# Patient Record
Sex: FEMALE | Race: OTHER | HISPANIC OR LATINO
[De-identification: names, ages, dates, MRNs, and addresses within clinical notes are randomized per-mention and may not be internally consistent; named-entity substitution may affect disease eponyms.]

---

## 2018-01-23 PROBLEM — Z00.00 ENCOUNTER FOR PREVENTIVE HEALTH EXAMINATION: Status: ACTIVE | Noted: 2018-01-23

## 2018-01-30 ENCOUNTER — LABORATORY RESULT (OUTPATIENT)
Age: 68
End: 2018-01-30

## 2018-01-31 ENCOUNTER — APPOINTMENT (OUTPATIENT)
Dept: OBGYN | Facility: CLINIC | Age: 68
End: 2018-01-31
Payer: MEDICARE

## 2018-01-31 DIAGNOSIS — Z80.9 FAMILY HISTORY OF MALIGNANT NEOPLASM, UNSPECIFIED: ICD-10-CM

## 2018-01-31 DIAGNOSIS — Z87.09 PERSONAL HISTORY OF OTHER DISEASES OF THE RESPIRATORY SYSTEM: ICD-10-CM

## 2018-01-31 DIAGNOSIS — I10 ESSENTIAL (PRIMARY) HYPERTENSION: ICD-10-CM

## 2018-01-31 PROCEDURE — 99387 INIT PM E/M NEW PAT 65+ YRS: CPT

## 2018-01-31 RX ORDER — VALSARTAN 320 MG/1
320 TABLET, COATED ORAL
Qty: 30 | Refills: 0 | Status: ACTIVE | COMMUNITY
Start: 2017-11-18

## 2018-01-31 RX ORDER — GABAPENTIN 100 MG/1
100 CAPSULE ORAL
Qty: 30 | Refills: 0 | Status: ACTIVE | COMMUNITY
Start: 2017-08-14

## 2018-01-31 RX ORDER — MELOXICAM 15 MG/1
15 TABLET ORAL
Qty: 30 | Refills: 0 | Status: ACTIVE | COMMUNITY
Start: 2017-08-14

## 2018-01-31 RX ORDER — LATANOPROST/PF 0.005 %
0.01 DROPS OPHTHALMIC (EYE)
Qty: 8 | Refills: 0 | Status: ACTIVE | COMMUNITY
Start: 2017-11-22

## 2018-01-31 RX ORDER — ATORVASTATIN CALCIUM 20 MG/1
20 TABLET, FILM COATED ORAL
Qty: 30 | Refills: 0 | Status: ACTIVE | COMMUNITY
Start: 2017-12-16

## 2018-01-31 RX ORDER — AMLODIPINE BESYLATE 2.5 MG/1
2.5 TABLET ORAL
Qty: 30 | Refills: 0 | Status: ACTIVE | COMMUNITY
Start: 2017-12-16

## 2018-01-31 RX ORDER — ASPIRIN 325 MG/1
TABLET, FILM COATED ORAL
Refills: 0 | Status: ACTIVE | COMMUNITY

## 2018-01-31 RX ORDER — CHLORTHALIDONE 25 MG/1
25 TABLET ORAL
Qty: 30 | Refills: 0 | Status: ACTIVE | COMMUNITY
Start: 2017-12-16

## 2018-01-31 RX ORDER — AMLODIPINE BESYLATE 5 MG/1
TABLET ORAL
Refills: 0 | Status: ACTIVE | COMMUNITY

## 2018-08-07 ENCOUNTER — INPATIENT (INPATIENT)
Facility: HOSPITAL | Age: 68
LOS: 1 days | Discharge: HOME | DRG: 313 | End: 2018-08-09
Attending: INTERNAL MEDICINE | Admitting: INTERNAL MEDICINE
Payer: COMMERCIAL

## 2018-08-07 VITALS
SYSTOLIC BLOOD PRESSURE: 184 MMHG | DIASTOLIC BLOOD PRESSURE: 71 MMHG | WEIGHT: 158.07 LBS | TEMPERATURE: 98 F | OXYGEN SATURATION: 100 % | HEIGHT: 65 IN | HEART RATE: 68 BPM | RESPIRATION RATE: 18 BRPM

## 2018-08-07 DIAGNOSIS — E78.5 HYPERLIPIDEMIA, UNSPECIFIED: ICD-10-CM

## 2018-08-07 DIAGNOSIS — Z29.9 ENCOUNTER FOR PROPHYLACTIC MEASURES, UNSPECIFIED: ICD-10-CM

## 2018-08-07 DIAGNOSIS — J45.909 UNSPECIFIED ASTHMA, UNCOMPLICATED: ICD-10-CM

## 2018-08-07 DIAGNOSIS — I10 ESSENTIAL (PRIMARY) HYPERTENSION: ICD-10-CM

## 2018-08-07 DIAGNOSIS — R07.9 CHEST PAIN, UNSPECIFIED: ICD-10-CM

## 2018-08-07 LAB
ALBUMIN SERPL ELPH-MCNC: 3.8 G/DL — SIGNIFICANT CHANGE UP (ref 3.5–5)
ALP SERPL-CCNC: 95 U/L — SIGNIFICANT CHANGE UP (ref 40–120)
ALT FLD-CCNC: 34 U/L DA — SIGNIFICANT CHANGE UP (ref 10–60)
ANION GAP SERPL CALC-SCNC: 6 MMOL/L — SIGNIFICANT CHANGE UP (ref 5–17)
AST SERPL-CCNC: 27 U/L — SIGNIFICANT CHANGE UP (ref 10–40)
BILIRUB SERPL-MCNC: 0.5 MG/DL — SIGNIFICANT CHANGE UP (ref 0.2–1.2)
BUN SERPL-MCNC: 8 MG/DL — SIGNIFICANT CHANGE UP (ref 7–18)
CALCIUM SERPL-MCNC: 9.1 MG/DL — SIGNIFICANT CHANGE UP (ref 8.4–10.5)
CHLORIDE SERPL-SCNC: 105 MMOL/L — SIGNIFICANT CHANGE UP (ref 96–108)
CK MB BLD-MCNC: 0.8 % — SIGNIFICANT CHANGE UP (ref 0–3.5)
CK MB CFR SERPL CALC: 1.4 NG/ML — SIGNIFICANT CHANGE UP (ref 0–3.6)
CK SERPL-CCNC: 173 U/L — SIGNIFICANT CHANGE UP (ref 21–215)
CO2 SERPL-SCNC: 28 MMOL/L — SIGNIFICANT CHANGE UP (ref 22–31)
CREAT SERPL-MCNC: 0.89 MG/DL — SIGNIFICANT CHANGE UP (ref 0.5–1.3)
GLUCOSE SERPL-MCNC: 112 MG/DL — HIGH (ref 70–99)
HCT VFR BLD CALC: 42.8 % — SIGNIFICANT CHANGE UP (ref 34.5–45)
HGB BLD-MCNC: 15.2 G/DL — SIGNIFICANT CHANGE UP (ref 11.5–15.5)
MCHC RBC-ENTMCNC: 33 PG — SIGNIFICANT CHANGE UP (ref 27–34)
MCHC RBC-ENTMCNC: 35.5 GM/DL — SIGNIFICANT CHANGE UP (ref 32–36)
MCV RBC AUTO: 93 FL — SIGNIFICANT CHANGE UP (ref 80–100)
PLATELET # BLD AUTO: 219 K/UL — SIGNIFICANT CHANGE UP (ref 150–400)
POTASSIUM SERPL-MCNC: 3.9 MMOL/L — SIGNIFICANT CHANGE UP (ref 3.5–5.3)
POTASSIUM SERPL-SCNC: 3.9 MMOL/L — SIGNIFICANT CHANGE UP (ref 3.5–5.3)
PROT SERPL-MCNC: 7.5 G/DL — SIGNIFICANT CHANGE UP (ref 6–8.3)
RBC # BLD: 4.6 M/UL — SIGNIFICANT CHANGE UP (ref 3.8–5.2)
RBC # FLD: 11.4 % — SIGNIFICANT CHANGE UP (ref 10.3–14.5)
SODIUM SERPL-SCNC: 139 MMOL/L — SIGNIFICANT CHANGE UP (ref 135–145)
TROPONIN I SERPL-MCNC: <0.015 NG/ML — SIGNIFICANT CHANGE UP (ref 0–0.04)
TROPONIN I SERPL-MCNC: <0.015 NG/ML — SIGNIFICANT CHANGE UP (ref 0–0.04)
WBC # BLD: 6.1 K/UL — SIGNIFICANT CHANGE UP (ref 3.8–10.5)
WBC # FLD AUTO: 6.1 K/UL — SIGNIFICANT CHANGE UP (ref 3.8–10.5)

## 2018-08-07 PROCEDURE — 71046 X-RAY EXAM CHEST 2 VIEWS: CPT | Mod: 26

## 2018-08-07 PROCEDURE — 99285 EMERGENCY DEPT VISIT HI MDM: CPT

## 2018-08-07 RX ORDER — ASPIRIN/CALCIUM CARB/MAGNESIUM 324 MG
81 TABLET ORAL DAILY
Qty: 0 | Refills: 0 | Status: DISCONTINUED | OUTPATIENT
Start: 2018-08-08 | End: 2018-08-09

## 2018-08-07 RX ORDER — ATORVASTATIN CALCIUM 80 MG/1
40 TABLET, FILM COATED ORAL AT BEDTIME
Qty: 0 | Refills: 0 | Status: DISCONTINUED | OUTPATIENT
Start: 2018-08-07 | End: 2018-08-09

## 2018-08-07 RX ORDER — VALSARTAN 80 MG/1
320 TABLET ORAL
Qty: 0 | Refills: 0 | Status: DISCONTINUED | OUTPATIENT
Start: 2018-08-08 | End: 2018-08-09

## 2018-08-07 RX ORDER — ENOXAPARIN SODIUM 100 MG/ML
40 INJECTION SUBCUTANEOUS DAILY
Qty: 0 | Refills: 0 | Status: DISCONTINUED | OUTPATIENT
Start: 2018-08-07 | End: 2018-08-09

## 2018-08-07 RX ORDER — DIAZEPAM 5 MG
2 TABLET ORAL ONCE
Qty: 0 | Refills: 0 | Status: DISCONTINUED | OUTPATIENT
Start: 2018-08-07 | End: 2018-08-07

## 2018-08-07 RX ORDER — ASPIRIN/CALCIUM CARB/MAGNESIUM 324 MG
325 TABLET ORAL ONCE
Qty: 0 | Refills: 0 | Status: COMPLETED | OUTPATIENT
Start: 2018-08-07 | End: 2018-08-07

## 2018-08-07 RX ORDER — VALSARTAN 80 MG/1
320 TABLET ORAL ONCE
Qty: 0 | Refills: 0 | Status: COMPLETED | OUTPATIENT
Start: 2018-08-07 | End: 2018-08-07

## 2018-08-07 RX ORDER — METOPROLOL TARTRATE 50 MG
25 TABLET ORAL DAILY
Qty: 0 | Refills: 0 | Status: DISCONTINUED | OUTPATIENT
Start: 2018-08-07 | End: 2018-08-09

## 2018-08-07 RX ADMIN — Medication 25 MILLIGRAM(S): at 17:10

## 2018-08-07 RX ADMIN — Medication 325 MILLIGRAM(S): at 14:29

## 2018-08-07 RX ADMIN — Medication 2 MILLIGRAM(S): at 15:16

## 2018-08-07 RX ADMIN — ENOXAPARIN SODIUM 40 MILLIGRAM(S): 100 INJECTION SUBCUTANEOUS at 17:10

## 2018-08-07 NOTE — ED ADULT NURSE REASSESSMENT NOTE - NS ED NURSE REASSESS COMMENT FT1
received pt A&Ox3 absent any distress or C/O pain. able to make needs know with care provided PRN. will continue with current plan of care

## 2018-08-07 NOTE — ED ADULT TRIAGE NOTE - CHIEF COMPLAINT QUOTE
biba c/o got dizzy at work , clammy , hands tingling . ems reports pt hypertensive at the scene . pt states chest pressure w/ high blood pressure x 2 days

## 2018-08-07 NOTE — H&P ADULT - HISTORY OF PRESENT ILLNESS
68 yo Yi speaking female with PMH of HTN, HLD, Asthma presented with chest tightness. Patient was shopping at 9:30 AM when suddenly she felt tightness in her chest associated with numbness in left arm and left hand. She felt nauseated and dizzy but did not pass out. She also felt that she could not breath and her body felt cold. She immediately called her daughter, who observed that there was purplish discolouration around her lips. There was no exacerbating or relieving factor. EMS arrived and started the patient on O2 and eventually she felt better. She denies any complain at present.   She is on Valsartan 320 mg for her BP. She informed me that her BP is not well controlled. She was taking amlodipine in the past but it was discontinued because she felt dizzy with it.   Her son also informed me that since last couple of days she has been very anxious and goes to her daughter house or son house because of anxiety. He did not mention any stress factor.   Patient had a stress test 3 years back, which was negative. On asking for the indication of stress test she said that she use to feel very anxious at that time so PCP had a stress test on her.     PMH: HTN, HLD, Asthma. She is on aspirin, atorvastatin 20mg, Valsartan 320mg OD. She has not take any medication for asthma in last 1 year   PSH: right knee surgery and right shoulder surgery for some ligament injury. Hysterectomy in 1982. Recent cosmetic surgery.

## 2018-08-07 NOTE — ED ADULT NURSE NOTE - OBJECTIVE STATEMENT
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 for HTN, taken dose today.  Denies any dizziness, blurry vision. As per daughter stated she felt nauseous and felt near syncopal. off note: Pt has received Botox fillers on Saturday ,daughter concerned if that was related to it. Had filler Botox in past, no reaction. Labs obtained, and sent as ordered

## 2018-08-07 NOTE — ED PROVIDER NOTE - MEDICAL DECISION MAKING DETAILS
66 y/o F pt symptoms highly suspicious of ACS. T-wave inversion on EKG without prior available. Will give Troponin, give Aspirin and admit to r/o ACS.

## 2018-08-07 NOTE — ED PROVIDER NOTE - OBJECTIVE STATEMENT
68 y/o F pt with a significant PMHx of HTN, HLD, intermittent asthma and no significant PSHx presents to the ED c/o chest pain PTA to the ED. Pt states she was walking a few blocks, when she felt a sudden onset of substernal chest pressure radiating to L arm with diaphoresis and nausea. Pt called EMS, but now reports symptoms alleviated completely without intervention; no current chest pain. Pt notes negative stress test 2 years ago. Pt denies lower extremity edema, diarrhea, melena, bloody stools, PE risk factors, previous occurrence or any other complaints. NKDA.

## 2018-08-07 NOTE — H&P ADULT - PROBLEM SELECTOR PLAN 5
IMPROVE VTE Individual Risk Assessment    RISK                                                                Points  [  ] Previous VTE                                                  3  [  ] Thrombophilia                                               2  [  ] Lower limb paralysis                                      2        (unable to hold up >15 seconds)    [  ] Current Cancer                                              2         (within 6 months)  [  ] Immobilization > 24 hrs                                1  [  ] ICU/CCU stay > 24 hours                              1  [x  ] Age > 60                                                      1    IMPROVE VTE Score _____1____  Lovenox 40 mg subcut IMPROVE VTE Individual Risk Assessment    RISK                                                                Points  [  ] Previous VTE                                                  3  [  ] Thrombophilia                                               2  [  ] Lower limb paralysis                                      2        (unable to hold up >15 seconds)    [  ] Current Cancer                                              2         (within 6 months)  [ x ] Immobilization > 24 hrs                                1  [  ] ICU/CCU stay > 24 hours                              1  [x  ] Age > 60                                                      1    IMPROVE VTE Score _____2____  Lovenox 40 mg subcut

## 2018-08-07 NOTE — ED ADULT NURSE NOTE - NSIMPLEMENTINTERV_GEN_ALL_ED
Implemented All Universal Safety Interventions:  [place] to call system. Call bell, personal items and telephone within reach. Instruct patient to call for assistance. Room bathroom lighting operational. Non-slip footwear when patient is off stretcher. Physically safe environment: no spills, clutter or unnecessary equipment. Stretcher in lowest position, wheels locked, appropriate side rails in place.

## 2018-08-07 NOTE — ED PROVIDER NOTE - CARE PLAN
Principal Discharge DX:	Chest pain, unspecified type  Secondary Diagnosis:	Hyperlipidemia, unspecified hyperlipidemia type  Secondary Diagnosis:	Hypertension, unspecified type

## 2018-08-07 NOTE — H&P ADULT - PROBLEM SELECTOR PLAN 1
- p/w chest tightness.   - EKG: TWI in v4,5,6. Not sure if it is new. No old EKG available to confirm  - T1 negative, Follow up with T2, T3.   - Aspirin, metoprolol, statin.  - Tele monitoring   - Dr Mcneil f/u  -d/d: ACS vs panic attack

## 2018-08-07 NOTE — H&P ADULT - ASSESSMENT
66 yo Arabic speaking female with PMH of HTN, HLD, Asthma presented with chest tightness. Admitted to rule out ACS

## 2018-08-08 LAB
24R-OH-CALCIDIOL SERPL-MCNC: 23.8 NG/ML — LOW (ref 30–80)
ANION GAP SERPL CALC-SCNC: 6 MMOL/L — SIGNIFICANT CHANGE UP (ref 5–17)
BASOPHILS # BLD AUTO: 0.1 K/UL — SIGNIFICANT CHANGE UP (ref 0–0.2)
BASOPHILS NFR BLD AUTO: 1 % — SIGNIFICANT CHANGE UP (ref 0–2)
BUN SERPL-MCNC: 10 MG/DL — SIGNIFICANT CHANGE UP (ref 7–18)
CALCIUM SERPL-MCNC: 9.4 MG/DL — SIGNIFICANT CHANGE UP (ref 8.4–10.5)
CHLORIDE SERPL-SCNC: 103 MMOL/L — SIGNIFICANT CHANGE UP (ref 96–108)
CHOLEST SERPL-MCNC: 173 MG/DL — SIGNIFICANT CHANGE UP (ref 10–199)
CK MB BLD-MCNC: 0.7 % — SIGNIFICANT CHANGE UP (ref 0–3.5)
CK MB CFR SERPL CALC: 1.1 NG/ML — SIGNIFICANT CHANGE UP (ref 0–3.6)
CK SERPL-CCNC: 153 U/L — SIGNIFICANT CHANGE UP (ref 21–215)
CO2 SERPL-SCNC: 32 MMOL/L — HIGH (ref 22–31)
CREAT SERPL-MCNC: 0.93 MG/DL — SIGNIFICANT CHANGE UP (ref 0.5–1.3)
EOSINOPHIL # BLD AUTO: 0.2 K/UL — SIGNIFICANT CHANGE UP (ref 0–0.5)
EOSINOPHIL NFR BLD AUTO: 3.2 % — SIGNIFICANT CHANGE UP (ref 0–6)
GLUCOSE SERPL-MCNC: 91 MG/DL — SIGNIFICANT CHANGE UP (ref 70–99)
HBA1C BLD-MCNC: 5.8 % — HIGH (ref 4–5.6)
HCT VFR BLD CALC: 45 % — SIGNIFICANT CHANGE UP (ref 34.5–45)
HDLC SERPL-MCNC: 60 MG/DL — SIGNIFICANT CHANGE UP (ref 40–125)
HGB BLD-MCNC: 14.7 G/DL — SIGNIFICANT CHANGE UP (ref 11.5–15.5)
LIPID PNL WITH DIRECT LDL SERPL: 89 MG/DL — SIGNIFICANT CHANGE UP
LYMPHOCYTES # BLD AUTO: 2.4 K/UL — SIGNIFICANT CHANGE UP (ref 1–3.3)
LYMPHOCYTES # BLD AUTO: 37.2 % — SIGNIFICANT CHANGE UP (ref 13–44)
MAGNESIUM SERPL-MCNC: 2.2 MG/DL — SIGNIFICANT CHANGE UP (ref 1.6–2.6)
MCHC RBC-ENTMCNC: 30.6 PG — SIGNIFICANT CHANGE UP (ref 27–34)
MCHC RBC-ENTMCNC: 32.7 GM/DL — SIGNIFICANT CHANGE UP (ref 32–36)
MCV RBC AUTO: 93.6 FL — SIGNIFICANT CHANGE UP (ref 80–100)
MONOCYTES # BLD AUTO: 0.6 K/UL — SIGNIFICANT CHANGE UP (ref 0–0.9)
MONOCYTES NFR BLD AUTO: 8.6 % — SIGNIFICANT CHANGE UP (ref 2–14)
NEUTROPHILS # BLD AUTO: 3.2 K/UL — SIGNIFICANT CHANGE UP (ref 1.8–7.4)
NEUTROPHILS NFR BLD AUTO: 50 % — SIGNIFICANT CHANGE UP (ref 43–77)
PHOSPHATE SERPL-MCNC: 4.1 MG/DL — SIGNIFICANT CHANGE UP (ref 2.5–4.5)
PLATELET # BLD AUTO: 244 K/UL — SIGNIFICANT CHANGE UP (ref 150–400)
POTASSIUM SERPL-MCNC: 4.2 MMOL/L — SIGNIFICANT CHANGE UP (ref 3.5–5.3)
POTASSIUM SERPL-SCNC: 4.2 MMOL/L — SIGNIFICANT CHANGE UP (ref 3.5–5.3)
RBC # BLD: 4.8 M/UL — SIGNIFICANT CHANGE UP (ref 3.8–5.2)
RBC # FLD: 11.5 % — SIGNIFICANT CHANGE UP (ref 10.3–14.5)
SODIUM SERPL-SCNC: 141 MMOL/L — SIGNIFICANT CHANGE UP (ref 135–145)
TOTAL CHOLESTEROL/HDL RATIO MEASUREMENT: 2.9 RATIO — LOW (ref 3.3–7.1)
TRIGL SERPL-MCNC: 121 MG/DL — SIGNIFICANT CHANGE UP (ref 10–149)
TROPONIN I SERPL-MCNC: <0.015 NG/ML — SIGNIFICANT CHANGE UP (ref 0–0.04)
TSH SERPL-MCNC: 3.22 UU/ML — SIGNIFICANT CHANGE UP (ref 0.34–4.82)
VIT B12 SERPL-MCNC: 464 PG/ML — SIGNIFICANT CHANGE UP (ref 232–1245)
WBC # BLD: 6.5 K/UL — SIGNIFICANT CHANGE UP (ref 3.8–10.5)
WBC # FLD AUTO: 6.5 K/UL — SIGNIFICANT CHANGE UP (ref 3.8–10.5)

## 2018-08-08 RX ORDER — HYDROCHLOROTHIAZIDE 25 MG
12.5 TABLET ORAL DAILY
Qty: 0 | Refills: 0 | Status: DISCONTINUED | OUTPATIENT
Start: 2018-08-08 | End: 2018-08-09

## 2018-08-08 RX ADMIN — ATORVASTATIN CALCIUM 40 MILLIGRAM(S): 80 TABLET, FILM COATED ORAL at 02:15

## 2018-08-08 RX ADMIN — Medication 81 MILLIGRAM(S): at 11:12

## 2018-08-08 RX ADMIN — ATORVASTATIN CALCIUM 40 MILLIGRAM(S): 80 TABLET, FILM COATED ORAL at 22:55

## 2018-08-08 RX ADMIN — ENOXAPARIN SODIUM 40 MILLIGRAM(S): 100 INJECTION SUBCUTANEOUS at 11:12

## 2018-08-08 RX ADMIN — Medication 12.5 MILLIGRAM(S): at 13:09

## 2018-08-08 RX ADMIN — VALSARTAN 320 MILLIGRAM(S): 80 TABLET ORAL at 02:15

## 2018-08-08 RX ADMIN — VALSARTAN 320 MILLIGRAM(S): 80 TABLET ORAL at 08:16

## 2018-08-08 NOTE — CONSULT NOTE ADULT - PROBLEM SELECTOR RECOMMENDATION 9
- p/w chest tightness.   - EKG: TWI in v4,5,6. Not sure if it is new. No old EKG available to confirm  - T1 negative, Follow up with T2, T3.   - Aspirin, metoprolol, statin.  - Tele monitoring   - Dr Mcneil f/u  -d/d: ACS vs panic attack. R/o ACS protocol  ECHO  Cardio eval  Tele monitoring  ASA, BB, Statin

## 2018-08-08 NOTE — CONSULT NOTE ADULT - PROBLEM SELECTOR RECOMMENDATION 4
Pt is not on any medication from last 1 year.   On exam : no wheeze  Monitor pt for any symptoms. Bronchodilators prn  inhaled steroids  pfts as OP

## 2018-08-08 NOTE — PROGRESS NOTE ADULT - SUBJECTIVE AND OBJECTIVE BOX
68 yo [place] speaking female with PMH of HTN, HLD, Asthma presented with chest tightness. Patient was shopping at 9:30 AM when suddenly she felt tightness in her chest associated with numbness in left arm and left hand. She felt nauseated and dizzy but did not pass out. She also felt that she could not breath and her body felt cold. She immediately called her daughter, who observed that there was purplish discolouration around her lips. There was no exacerbating or relieving factor. EMS arrived and started the patient on O2 and eventually she felt better. She denies any complain at present.   She is on Valsartan 320 mg for her BP. She informed me that her BP is not well controlled. She was taking amlodipine in the past but it was discontinued because she felt dizzy with it.   Her son also informed me that since last couple of days she has been very anxious and goes to her daughter house or son house because of anxiety. He did not mention any stress factor.   Patient had a stress test 3 years back, which was negative. On asking for the indication of stress test she said that she use to feel very anxious at that time so PCP had a stress test on her.     PMH: HTN, HLD, Asthma. She is on aspirin, atorvastatin 20mg, Valsartan 320mg OD. She has not take any medication for asthma in last 1 year   PSH: right knee surgery and right shoulder surgery for some ligament injury. Hysterectomy in 1982. Recent cosmetic surgery.          Review of Systems:  Other Review of Systems: All other review of systems negative, except as noted in HPI	      pt seen in ed hold [ x ], on tele [ x  ], bed [ x ], chair at bedside [   ], a+o x3 [ x ], lethargic [  ],  nad [x  ]            Allergies    No Known Allergies        Vitals    T(F): 97.4 (08-08-18 @ 07:40), Max: 98.3 (08-07-18 @ 17:13)  HR: 68 (08-08-18 @ 07:40) (58 - 76)  BP: 177/81 (08-08-18 @ 07:40) (168/85 - 181/89)  RR: 16 (08-08-18 @ 07:40) (16 - 18)  SpO2: 97% (08-08-18 @ 07:40) (95% - 98%)  Wt(kg): --  CAPILLARY BLOOD GLUCOSE      POCT Blood Glucose.: 107 mg/dL (07 Aug 2018 12:49)      Labs                          14.7   6.5   )-----------( 244      ( 08 Aug 2018 07:29 )             45.0       08-08    141  |  103  |  10  ----------------------------<  91  4.2   |  32<H>  |  0.93    Ca    9.4      08 Aug 2018 07:29  Phos  4.1     08-08  Mg     2.2     08-08    TPro  7.5  /  Alb  3.8  /  TBili  0.5  /  DBili  x   /  AST  27  /  ALT  34  /  AlkPhos  95  08-07      CARDIAC MARKERS ( 08 Aug 2018 07:29 )  <0.015 ng/mL / x     / 153 U/L / x     / 1.1 ng/mL  CARDIAC MARKERS ( 07 Aug 2018 19:42 )  <0.015 ng/mL / x     / 173 U/L / x     / 1.4 ng/mL  CARDIAC MARKERS ( 07 Aug 2018 13:37 )  <0.015 ng/mL / x     / x     / x     / x                Radiology Results      Meds    MEDICATIONS  (STANDING):  aspirin enteric coated 81 milliGRAM(s) Oral daily  atorvastatin 40 milliGRAM(s) Oral at bedtime  enoxaparin Injectable 40 milliGRAM(s) SubCutaneous daily  metoprolol succinate ER 25 milliGRAM(s) Oral daily  valsartan 320 milliGRAM(s) Oral <User Schedule>      MEDICATIONS  (PRN):      Physical Exam    Neuro :  no focal deficits  Respiratory: CTA B/L  CV: RRR, S1S2, no murmurs,   Abdominal: Soft, NT, ND +BS,  Extremities: No edema, + peripheral pulses    ASSESSMENT    Chest pain  Asthma  HLD (hyperlipidemia)  HTN (hypertension)  No significant past surgical history      PLAN 68 yo Ghanaian speaking female with PMH of HTN, HLD, Asthma presented with chest tightness. Patient was shopping at 9:30 AM when suddenly she felt tightness in her chest associated with numbness in left arm and left hand. She felt nauseated and dizzy but did not pass out. She also felt that she could not breath and her body felt cold. She immediately called her daughter, who observed that there was purplish discolouration around her lips. There was no exacerbating or relieving factor. EMS arrived and started the patient on O2 and eventually she felt better. She denies any complain at present.   She is on Valsartan 320 mg for her BP. She informed me that her BP is not well controlled. She was taking amlodipine in the past but it was discontinued because she felt dizzy with it.   Her son also informed me that since last couple of days she has been very anxious and goes to her daughter house or son house because of anxiety. He did not mention any stress factor.   Patient had a stress test 3 years back, which was negative. On asking for the indication of stress test she said that she use to feel very anxious at that time so PCP had a stress test on her.     PMH: HTN, HLD, Asthma. She is on aspirin, atorvastatin 20mg, Valsartan 320mg OD. She has not take any medication for asthma in last 1 year   PSH: right knee surgery and right shoulder surgery for some ligament injury. Hysterectomy in 1982. Recent cosmetic surgery.          Review of Systems:  Other Review of Systems: All other review of systems negative, except as noted in HPI	      pt seen in ed hold [ x ], on tele [ x  ], bed [ x ], chair at bedside [   ], a+o x3 [ x ], lethargic [  ],  nad [x  ]            Allergies    No Known Allergies        Vitals    T(F): 97.4 (08-08-18 @ 07:40), Max: 98.3 (08-07-18 @ 17:13)  HR: 68 (08-08-18 @ 07:40) (58 - 76)  BP: 177/81 (08-08-18 @ 07:40) (168/85 - 181/89)  RR: 16 (08-08-18 @ 07:40) (16 - 18)  SpO2: 97% (08-08-18 @ 07:40) (95% - 98%)  Wt(kg): --  CAPILLARY BLOOD GLUCOSE      POCT Blood Glucose.: 107 mg/dL (07 Aug 2018 12:49)      Labs                          14.7   6.5   )-----------( 244      ( 08 Aug 2018 07:29 )             45.0       08-08    141  |  103  |  10  ----------------------------<  91  4.2   |  32<H>  |  0.93    Ca    9.4      08 Aug 2018 07:29  Phos  4.1     08-08  Mg     2.2     08-08    TPro  7.5  /  Alb  3.8  /  TBili  0.5  /  DBili  x   /  AST  27  /  ALT  34  /  AlkPhos  95  08-07      CARDIAC MARKERS ( 08 Aug 2018 07:29 )  <0.015 ng/mL / x     / 153 U/L / x     / 1.1 ng/mL  CARDIAC MARKERS ( 07 Aug 2018 19:42 )  <0.015 ng/mL / x     / 173 U/L / x     / 1.4 ng/mL  CARDIAC MARKERS ( 07 Aug 2018 13:37 )  <0.015 ng/mL / x     / x     / x     / x                Radiology Results        Meds    MEDICATIONS  (STANDING):  aspirin enteric coated 81 milliGRAM(s) Oral daily  atorvastatin 40 milliGRAM(s) Oral at bedtime  enoxaparin Injectable 40 milliGRAM(s) SubCutaneous daily  metoprolol succinate ER 25 milliGRAM(s) Oral daily  valsartan 320 milliGRAM(s) Oral <User Schedule>      MEDICATIONS  (PRN):      Physical Exam    Neuro :  no focal deficits  Respiratory: CTA B/L  CV: RRR, S1S2, no murmurs,   Abdominal: Soft, NT, ND +BS,  Extremities: No edema, + peripheral pulses    ASSESSMENT    uncontrolled htn with hypertensive urgency  Chest pain and left ue numbness  r/o acs  r/o cns patho  h/o Asthma  HLD (hyperlipidemia)  HTN (hypertension)  No significant past surgical history      PLAN    cont tele  cont statin and aspirin  cont lopressor and valsartan  cardio cons  f/u echo  ce q8 x3 neg noted above  neuro cons  f/u mri head  pulm cons  cont current meds

## 2018-08-08 NOTE — CONSULT NOTE ADULT - SUBJECTIVE AND OBJECTIVE BOX
History of Present Illness:    HPI:  68 yo Amharic speaking female with PMH of HTN, HLD, Asthma presented with chest tightness. Patient was shopping at 9:30 AM when suddenly she felt tightness in her chest associated with numbness in left arm and left hand. She felt nauseated and dizzy but did not pass out. She also felt that she could not breath and her body felt cold. She immediately called her daughter, who observed that there was purplish discolouration around her lips. There was no exacerbating or relieving factor. EMS arrived and started the patient on O2 and eventually she felt better. She denies any complain at present.   She is on Valsartan 320 mg for her BP. She informed me that her BP is not well controlled. She was taking amlodipine in the past but it was discontinued because she felt dizzy with it.   Her son also informed me that since last couple of days she has been very anxious and goes to her daughter house or son house because of anxiety. He did not mention any stress factor.   Patient had a stress test 3 years back, which was negative. On asking for the indication of stress test she said that she use to feel very anxious at that time so PCP had a stress test on her.     PMH: HTN, HLD, Asthma. She is on aspirin, atorvastatin 20mg, Valsartan 320mg OD. She has not take any medication for asthma in last 1 year   PSH: right knee surgery and right shoulder surgery for some ligament injury. Hysterectomy in 1982. Recent cosmetic surgery. (07 Aug 2018 21:03)    Pt seen in ER w/ dtr present and translating [place]i.  Pt and Dtr report that yesterday when pt was having chest tightness her "brain felt weird."  Stated that she does not feel it now but remembers having a HA before weird feeling.  Denies numbness today.            Allergies    No Known Allergies    Intolerances          PAST MEDICAL & SURGICAL HISTORY:  Asthma  HLD (hyperlipidemia)  HTN (hypertension)  No significant past surgical history      Social History: [ ] Tobacco use, [ ] Alcohol use.        MEDICATIONS  (STANDING):  aspirin enteric coated 81 milliGRAM(s) Oral daily  atorvastatin 40 milliGRAM(s) Oral at bedtime  enoxaparin Injectable 40 milliGRAM(s) SubCutaneous daily  hydrochlorothiazide 12.5 milliGRAM(s) Oral daily  metoprolol succinate ER 25 milliGRAM(s) Oral daily  valsartan 320 milliGRAM(s) Oral <User Schedule>        Family:  FAMILY HISTORY:  No pertinent family history in first degree relatives        Review of Systems:  General: [ ] None, [ ] chills,[ ] fatigue,[ ] fevers  Skin: [ ] None, [ ] rash present  HEENT: [ ] None, [ ] head injury,[ ] blurred vision, [ ] double vision, [ ] eye pain, [ ] visual loss, [ ] hearing loss, [ ] deafness, [ ] ear pain, [ ] ringing in the ears, [ ] vertigo, [ ] sinus pain, [ ] voice changes  Neck: [ ] None, [ ] Neck stiffness  Respiratory: [ ]  None, [ ] cough, [ ] difficulty breathing  Cardiovascular: [ ] None,  [ ] calf cramps, [ ] chest pain, [ ] leg pain, [ ] swelling, [ ] rapid heart rate, [ ] shortness of breath  Gastrointestinal: [ ] None, [ ] abdominal pain, [ ] nausea, [ ] vomiting  Musculoskeletal: [ ] None, [ ] back pain, [ ] joint pain, [ ] joint stiffness, [ ] leg cramps, [ ] muscle atrophy, [ ] muscle cramps, [ ] muscle weakness, [ ] swelling of extremities  Neurological: [ ] None,  [ ] Dizziness, [ ] decreased memory, [ ] fainting, [ ] focal neurological symptoms, [ ] headaches, [ ] incontinence of stool, [ ] incontinence of urine, [ ] loss of consciousness, [X ] numbness, [ ] seizures, [ ] spinning sensation, [ ] stroke, [ ] trouble walking, [ ] unsteadiness, [ ] visual changes,  [ ] weakness, [ ] tremors, [ ] rigidity, [ ] slowness  Psychiatric: [ ] None,  [ ] depression, [ ] anxiety, [ ] hallucinations, [ ] inability to concentrate,[ ] mood changes, [ ] panic attacks  Hematology: [ ] None, [ ] blood clots, [ ] spontaneous bleeding    [ ]  None except marked above      Vital Signs:    T(C): 36.6 (08-08-18 @ 13:06), Max: 36.8 (08-07-18 @ 11:51)  HR: 61 (08-08-18 @ 13:06) (58 - 76)  BP: 146/71 (08-08-18 @ 13:06) (146/71 - 184/71)  RR: 16 (08-08-18 @ 13:06) (16 - 18)  SpO2: 99% (08-08-18 @ 13:06) (95% - 100%)    Physical Exam:  General:  General Appearance - Well groomed, Not sickly   Build and nutrition - Well nourished and Well developed  Posture - Normal posture    Head and Neck:  Head - normocephalic, atraumatic with no lesions or palpable masses    Chest and Lung exam:  Quiet, even and easy respiratory effort with no use of accessory muscles and on ausculation, normal breath sounds, no adventitious sounds and normal vocal resonance    Cardiovascular:  Auscultation: Normal heart sounds  Murmurs & Other heart sounds: Auscultation of the heart reveals- no murmurs  Carotid arteries: No Carotid bruits    Abdomen:  Palpation/Percussion: Non Tender, No Rebound tenderness, No hepatosplenomegaly, and No palpable abdominal masses    Peripheral Vascular:  Lower extremity: Inspection - Bilateral - No varicose veins  Palpation: Tenderness - bilateral - Non tender  Dorsalis pedis pulse - bilateral - normal  Edema - bilateral - no edema    Neurologic Exam:  Mental Status -  Alert, Awake  Fund of Knowledge – Normal  Affect- appropriate  Recent Memory – Normal, Memory loss [ ] Mild, [ ] Moderate, [ ] Severe  Remote Memory – Normal  Attention Span – Normal  Concentration –  Normal  Cognitive function – Normal  Speech – Normal  Thought content/perception – Normal    Cranial Nerves:  II Optic: Visual acuity – Bilateral - Normal ; Visual fields – normal ; Fundi – Bilateral – no optic atrophy or Papilledema  III Oculomotor – Normal bilaterally  IV Trochlear – Bilateral – Normal  V Trigeminal: Ophthalmic – Bilateral – Normal;  Maxillary – Bilateral- Normal; Mandibular – Bilateral - Normal  VI Abducens – Bilateral - Normal  VII Facial: Normal bilaterally  VIII Acoustic - Bilateral – hearing normal and (hearing tested by finger rub)  IX Glossopharyngeal / X Vagus: Uvula – Normal  XI Accessory: Normal Shoulder Shrug  XII Hypoglossal – Bilaterally Normal  Eye Movements: Gaze – Bilateral - Normal    Nystagmus – Bilateral – None  Motor:  Bulk and Contour: Normal  Tone: Normal  Strength:                                                             Delt              Bicep           Tricep                                 Upper Extremities:   Right              5/5               5/5               5/5                5/5                                                          Left                5/5               5/5               5/5                5/5                                                                                  HF                 KE                KF                   DF                     Lower Extremities:   Right             5/5               5/5              5/5                  5/5                                                          Left               5/5               5/5              5/5                  5/5  General Assessment of Reflexes: Right Wrist - 2+ ;  Left Wrist - 2+ ; Right Elbow - 2+ ;  Left Elbow - 2+ ;  Right Knee - 2+ ;  Left Knee - 2+ ;   Right Ankle – 2+ ; Left Ankle – 2+  Plantar Reflexes(Babinski) (L4-S2) – Bilateral – Flexion  Sensory:  Light Touch: Intact – Globally  Pain: Intact – Globally  Temperature: Intact – Globally  Coordination – No Impairment of heel-to-shin, Impairment of finger-to-nose or Impairment of rapid alternating movement  Gait – Normal tandem walking, Normal heel walking and Normal toes walking  Romberg’s sign: - Normal    Cogwheel Rigidity - absent  Resting tremors - absent  Bradykinesia - absent  Retropulsion - absent

## 2018-08-08 NOTE — CONSULT NOTE ADULT - SUBJECTIVE AND OBJECTIVE BOX
CHIEF COMPLAINT:Patient is a 67y old  Female who presents with a chief complaint of chest tightness.      HPI:  67 yr old  Ivorian speaking female with PMHX of HTN, HLD, Asthma presented with chest tightness. Patient was shopping at 9:30 AM when suddenly she felt tightness in her chest associated with numbness in left arm and left hand. She felt nauseated and dizzy but did not pass out. She also felt that she could not breath and her body felt cold. She immediately called her daughter, who observed that there was purplish discolouration around her lips. There was no exacerbating or relieving factor. EMS arrived and started the patient on O2 and eventually she felt better. She denies any complain at present.   She is on Valsartan 320 mg for her BP. She informed me that her BP is not well controlled. She was taking amlodipine in the past but it was discontinued because she felt dizzy with it.   Her son also informed me that since last couple of days she has been very anxious and goes to her daughter house or son house because of anxiety. He did not mention any stress factor.   Patient had a stress test 3 years back, which was negative. On asking for the indication of stress test she said that she use to feel very anxious at that time so PCP had a stress test on her.       PAST MEDICAL & SURGICAL HISTORY:  Asthma  HLD (hyperlipidemia)  HTN (hypertension)  Right knee surgery and right shoulder surgery for some ligament injury  Hysterectomy in 1982  Recent cosmetic surgery      MEDICATIONS  (STANDING):  aspirin enteric coated 81 milliGRAM(s) Oral daily  atorvastatin 40 milliGRAM(s) Oral at bedtime  enoxaparin Injectable 40 milliGRAM(s) SubCutaneous daily  metoprolol succinate ER 25 milliGRAM(s) Oral daily  valsartan 320 milliGRAM(s) Oral <User Schedule>    MEDICATIONS  (PRN):      FAMILY HISTORY:  No pertinent family history in first degree relatives      SOCIAL HISTORY:    [x ] Non-smoker    [ x] Alcohol-denies    Allergies    No Known Allergies    Intolerances    	    REVIEW OF SYSTEMS:  CONSTITUTIONAL: No fever, weight loss, or fatigue  EYES: No eye pain, visual disturbances, or discharge  ENT:  No difficulty hearing, tinnitus, vertigo; No sinus or throat pain  NECK: No pain or stiffness  RESPIRATORY: No cough, wheezing, chills or hemoptysis; No Shortness of Breath  CARDIOVASCULAR: + chest pain, No palpitations, passing out, dizziness, or leg swelling  GASTROINTESTINAL: No abdominal or epigastric pain. No nausea, vomiting, or hematemesis; No diarrhea or constipation. No melena or hematochezia.  GENITOURINARY: No dysuria, frequency, hematuria, or incontinence  NEUROLOGICAL: No headaches, memory loss, +loss of strength, +numbness  SKIN: No itching, burning, rashes, or lesions   LYMPH Nodes: No enlarged glands  ENDOCRINE: No heat or cold intolerance; No hair loss  MUSCULOSKELETAL: No joint pain or swelling; No muscle, back, or extremity pain  PSYCHIATRIC: No depression, anxiety, mood swings, or difficulty sleeping  HEME/LYMPH: No easy bruising, or bleeding gums  ALLERGY AND IMMUNOLOGIC: No hives or eczema	      PHYSICAL EXAM:  T(C): 36.3 (08-08-18 @ 07:40), Max: 36.8 (08-07-18 @ 17:13)  HR: 68 (08-08-18 @ 07:40) (58 - 76)  BP: 177/81 (08-08-18 @ 07:40) (168/85 - 181/89)  RR: 16 (08-08-18 @ 07:40) (16 - 18)  SpO2: 97% (08-08-18 @ 07:40) (95% - 98%)      Appearance: Normal	  HEENT:   Normal oral mucosa, PERRL, EOMI	  Lymphatic: No lymphadenopathy  Cardiovascular: Normal S1 S2, No JVD, No murmurs, No edema  Respiratory: Lungs clear to auscultation	  Psychiatry: A & O x 3, Mood & affect appropriate  Gastrointestinal:  Soft, Non-tender, + BS	  Skin: No rashes, No ecchymoses, No cyanosis	  Neurologic: Non-focal  Extremities: Normal range of motion, No clubbing, cyanosis or edema  Vascular: Peripheral pulses palpable 2+ bilaterally    	    ECG:  	nsr with t wave inversion lateral leads    	  LABS:	 	    CARDIAC MARKERS:  CARDIAC MARKERS ( 08 Aug 2018 07:29 )  <0.015 ng/mL / x     / 153 U/L / x     / 1.1 ng/mL  CARDIAC MARKERS ( 07 Aug 2018 19:42 )  <0.015 ng/mL / x     / 173 U/L / x     / 1.4 ng/mL  CARDIAC MARKERS ( 07 Aug 2018 13:37 )  <0.015 ng/mL / x     / x     / x     / x                                  14.7   6.5   )-----------( 244      ( 08 Aug 2018 07:29 )             45.0     08-08    141  |  103  |  10  ----------------------------<  91  4.2   |  32<H>  |  0.93    Ca    9.4      08 Aug 2018 07:29  Phos  4.1     08-08  Mg     2.2     08-08    TPro  7.5  /  Alb  3.8  /  TBili  0.5  /  DBili  x   /  AST  27  /  ALT  34  /  AlkPhos  95  08-07      Lipid Profile: Cholesterol 173  LDL 89  HDL 60      HgA1c: Hemoglobin A1C, Whole Blood: 5.8 % (08-08 @ 09:24)    TSH: Thyroid Stimulating Hormone, Serum: 3.22 uU/mL (08-08 @ 07:29)      cxr-.

## 2018-08-08 NOTE — CONSULT NOTE ADULT - SUBJECTIVE AND OBJECTIVE BOX
PULMONARY CONSULT NOTE      KIERAN ALONZO  MRN-[id number]    Patient is a 67y old  Female who presents with a chief complaint of chest tightness (07 Aug 2018 21:03)     History of Present Illness:  Reason for Admission: chest tightness	  History of Present Illness:	  68 yo Egyptian speaking female with PMH of HTN, HLD, Asthma presented with chest tightness. Patient was shopping at 9:30 AM when suddenly she felt tightness in her chest associated with numbness in left arm and left hand. She felt nauseated and dizzy but did not pass out. She also felt that she could not breath and her body felt cold. She immediately called her daughter, who observed that there was purplish discolouration around her lips. There was no exacerbating or relieving factor. EMS arrived and started the patient on O2 and eventually she felt better. She denies any complain at present.   She is on Valsartan 320 mg for her BP. She informed me that her BP is not well controlled. She was taking amlodipine in the past but it was discontinued because she felt dizzy with it.   Her son also informed me that since last couple of days she has been very anxious and goes to her daughter house or son house because of anxiety. He did not mention any stress factor.   Patient had a stress test 3 years back, which was negative. On asking for the indication of stress test she said that she use to feel very anxious at that time so PCP had a stress test on her.     HISTORY OF PRESENT ILLNESS: As above , awake , alert , lying  in bed . With chest tightness .    MEDICATIONS  (STANDING):  aspirin enteric coated 81 milliGRAM(s) Oral daily  atorvastatin 40 milliGRAM(s) Oral at bedtime  enoxaparin Injectable 40 milliGRAM(s) SubCutaneous daily  hydrochlorothiazide 12.5 milliGRAM(s) Oral daily  metoprolol succinate ER 25 milliGRAM(s) Oral daily  valsartan 320 milliGRAM(s) Oral <User Schedule>      MEDICATIONS  (PRN):      Allergies    No Known Allergies    Intolerances        PAST MEDICAL & SURGICAL HISTORY:  Asthma  HLD (hyperlipidemia)  HTN (hypertension)  No significant past surgical history      FAMILY HISTORY:  No pertinent family history in first degree relatives      SOCIAL HISTORY  Smoking History:     REVIEW OF SYSTEMS:    CONSTITUTIONAL:  No fevers, chills, sweats    HEENT:  Eyes:  No diplopia or blurred vision. ENT:  No earache, sore throat or runny nose.    CARDIOVASCULAR:  No pressure, squeezing, tightness, or heaviness about the chest; no palpitations.    RESPIRATORY:  Per HPI    GASTROINTESTINAL:  No abdominal pain, nausea, vomiting or diarrhea.    GENITOURINARY:  No dysuria, frequency or urgency.    NEUROLOGIC:  No paresthesias, fasciculations, seizures or weakness.    PSYCHIATRIC:  No disorder of thought or mood.    Vital Signs Last 24 Hrs  T(C): 36.6 (08 Aug 2018 13:06), Max: 36.8 (07 Aug 2018 17:13)  T(F): 97.8 (08 Aug 2018 13:06), Max: 98.3 (07 Aug 2018 17:13)  HR: 61 (08 Aug 2018 13:06) (58 - 73)  BP: 146/71 (08 Aug 2018 13:06) (146/71 - 181/89)  BP(mean): --  RR: 16 (08 Aug 2018 13:06) (16 - 17)  SpO2: 99% (08 Aug 2018 13:06) (95% - 99%)  I&O's Detail      PHYSICAL EXAMINATION:    GENERAL: The patient is a well-developed, well-nourished _____in no apparent distress.     HEENT: Head is normocephalic and atraumatic. Extraocular muscles are intact. Mucous membranes are moist.     NECK: Supple.     LUNGS: Clear to auscultation without wheezing, rales, or rhonchi. Respirations unlabored    HEART: Regular rate and rhythm without murmur.    ABDOMEN: Soft, nontender, and nondistended.  No hepatosplenomegaly is noted.    EXTREMITIES: Without any cyanosis, clubbing, rash, lesions or edema.    NEUROLOGIC: Grossly intact.      LABS:                        14.7   6.5   )-----------( 244      ( 08 Aug 2018 07:29 )             45.0     08-08    141  |  103  |  10  ----------------------------<  91  4.2   |  32<H>  |  0.93    Ca    9.4      08 Aug 2018 07:29  Phos  4.1     08-08  Mg     2.2     08-08    TPro  7.5  /  Alb  3.8  /  TBili  0.5  /  DBili  x   /  AST  27  /  ALT  34  /  AlkPhos  95  08-07          CARDIAC MARKERS ( 08 Aug 2018 07:29 )  <0.015 ng/mL / x     / 153 U/L / x     / 1.1 ng/mL  CARDIAC MARKERS ( 07 Aug 2018 19:42 )  <0.015 ng/mL / x     / 173 U/L / x     / 1.4 ng/mL  CARDIAC MARKERS ( 07 Aug 2018 13:37 )  <0.015 ng/mL / x     / x     / x     / x                    MICROBIOLOGY:    RADIOLOGY & ADDITIONAL STUDIES:    CXR:  < from: Xray Chest 2 Views PA/Lat (08.07.18 @ 13:48) >  IMPRESSION:  No infiltrate.    < end of copied text >    Ct scan chest:    ekg;    echo:  < from: Transthoracic Echocardiogram (08.08.18 @ 15:58) >  CONCLUSIONS:  1. Mild mitral regurgitation.  2.  Trace aortic regurgitation.  3. Mildly dilated left atrium.  LA volume index = 37 cc/m2.  4. Mild concentric left ventricular hypertrophy.  5. Normal left ventricular function. Mild diastolic  dysfunction (stage I).  6. Normal right ventricular size and function.    < end of copied text > PULMONARY CONSULT NOTE      KIERAN ALONZO  MRN-[id number]    Patient is a 67y old  Female who presents with a chief complaint of chest tightness (07 Aug 2018 21:03)     History of Present Illness:  Reason for Admission: chest tightness	  History of Present Illness:	  66 yo Comoran speaking female with PMH of HTN, HLD, Asthma presented with chest tightness. Patient was shopping at 9:30 AM when suddenly she felt tightness in her chest associated with numbness in left arm and left hand. She felt nauseated and dizzy but did not pass out. She also felt that she could not breath and her body felt cold. She immediately called her daughter, who observed that there was purplish discolouration around her lips. There was no exacerbating or relieving factor. EMS arrived and started the patient on O2 and eventually she felt better. She denies any complain at present.   She is on Valsartan 320 mg for her BP. She informed me that her BP is not well controlled. She was taking amlodipine in the past but it was discontinued because she felt dizzy with it.   Her son also informed me that since last couple of days she has been very anxious and goes to her daughter house or son house because of anxiety. He did not mention any stress factor.   Patient had a stress test 3 years back, which was negative. On asking for the indication of stress test she said that she use to feel very anxious at that time so PCP had a stress test on her.     HISTORY OF PRESENT ILLNESS: As above , awake , alert , lying  in bed . With chest tightness and cough for a few days .    MEDICATIONS  (STANDING):  aspirin enteric coated 81 milliGRAM(s) Oral daily  atorvastatin 40 milliGRAM(s) Oral at bedtime  enoxaparin Injectable 40 milliGRAM(s) SubCutaneous daily  hydrochlorothiazide 12.5 milliGRAM(s) Oral daily  metoprolol succinate ER 25 milliGRAM(s) Oral daily  valsartan 320 milliGRAM(s) Oral <User Schedule>      MEDICATIONS  (PRN):      Allergies    No Known Allergies    Intolerances        PAST MEDICAL & SURGICAL HISTORY:  Asthma  HLD (hyperlipidemia)  HTN (hypertension)  No significant past surgical history      FAMILY HISTORY:  No pertinent family history in first degree relatives      SOCIAL HISTORY  Smoking History:     REVIEW OF SYSTEMS:    CONSTITUTIONAL:  No fevers, chills, sweats    HEENT:  Eyes:  No diplopia or blurred vision. ENT:  No earache, sore throat or runny nose.    CARDIOVASCULAR:  No pressure, squeezing, tightness, or heaviness about the chest; no palpitations.    RESPIRATORY:  Per HPI    GASTROINTESTINAL:  No abdominal pain, nausea, vomiting or diarrhea.    GENITOURINARY:  No dysuria, frequency or urgency.    NEUROLOGIC:  No paresthesias, fasciculations, seizures or weakness.    PSYCHIATRIC:  No disorder of thought or mood.    Vital Signs Last 24 Hrs  T(C): 36.6 (08 Aug 2018 13:06), Max: 36.8 (07 Aug 2018 17:13)  T(F): 97.8 (08 Aug 2018 13:06), Max: 98.3 (07 Aug 2018 17:13)  HR: 61 (08 Aug 2018 13:06) (58 - 73)  BP: 146/71 (08 Aug 2018 13:06) (146/71 - 181/89)  BP(mean): --  RR: 16 (08 Aug 2018 13:06) (16 - 17)  SpO2: 99% (08 Aug 2018 13:06) (95% - 99%)  I&O's Detail      PHYSICAL EXAMINATION:    GENERAL: The patient is a well-developed, well-nourished _____in no apparent distress.     HEENT: Head is normocephalic and atraumatic. Extraocular muscles are intact. Mucous membranes are moist.     NECK: Supple.     LUNGS: Clear to auscultation without wheezing, rales, or rhonchi. Respirations unlabored    HEART: Regular rate and rhythm without murmur.    ABDOMEN: Soft, nontender, and nondistended.  No hepatosplenomegaly is noted.    EXTREMITIES: Without any cyanosis, clubbing, rash, lesions or edema.    NEUROLOGIC: Grossly intact.      LABS:                        14.7   6.5   )-----------( 244      ( 08 Aug 2018 07:29 )             45.0     08-08    141  |  103  |  10  ----------------------------<  91  4.2   |  32<H>  |  0.93    Ca    9.4      08 Aug 2018 07:29  Phos  4.1     08-08  Mg     2.2     08-08    TPro  7.5  /  Alb  3.8  /  TBili  0.5  /  DBili  x   /  AST  27  /  ALT  34  /  AlkPhos  95  08-07          CARDIAC MARKERS ( 08 Aug 2018 07:29 )  <0.015 ng/mL / x     / 153 U/L / x     / 1.1 ng/mL  CARDIAC MARKERS ( 07 Aug 2018 19:42 )  <0.015 ng/mL / x     / 173 U/L / x     / 1.4 ng/mL  CARDIAC MARKERS ( 07 Aug 2018 13:37 )  <0.015 ng/mL / x     / x     / x     / x                    MICROBIOLOGY:    RADIOLOGY & ADDITIONAL STUDIES:    CXR:  < from: Xray Chest 2 Views PA/Lat (08.07.18 @ 13:48) >  IMPRESSION:  No infiltrate.    < end of copied text >    Ct scan chest:    ekg;    echo:  < from: Transthoracic Echocardiogram (08.08.18 @ 15:58) >  CONCLUSIONS:  1. Mild mitral regurgitation.  2.  Trace aortic regurgitation.  3. Mildly dilated left atrium.  LA volume index = 37 cc/m2.  4. Mild concentric left ventricular hypertrophy.  5. Normal left ventricular function. Mild diastolic  dysfunction (stage I).  6. Normal right ventricular size and function.    < end of copied text >

## 2018-08-08 NOTE — CONSULT NOTE ADULT - ASSESSMENT
66 yo Portuguese speaking female with PMH of HTN, HLD, Asthma presented with chest tightness. Admitted to rule out ACS

## 2018-08-08 NOTE — CONSULT NOTE ADULT - ASSESSMENT
67 yr old  Luxembour[place] speaking female with PMHX of HTN, HLD, Asthma presented with chest tightness with numbness in left arm and left hand.   1.Tele monitoring.  2.Neurology eval.  3.Echocardiogram.  4.Stress test-r/o ischemia.  5.HTN-cont arb and b blocker,add hctz 12.5mg qd.  6.Asthma-stable.  7.Cont asa,statin.  8.GI and DVT prophylaxis.

## 2018-08-08 NOTE — CONSULT NOTE ADULT - ATTENDING COMMENTS
Left arm and hand numbness  ? Cervical radiculopathy vs TIA - right MCA territory  Obtain MRI brain without contrast to look at the extent and distribution of the stroke.   MRA H/N to look at the cerebral vasculature.   Continue with Aspirin for secondary stroke prevention.       HbA1C and LDL.     PT/OT/Speech and swallow/safety eval.    Above mentioned plan was discussed with patient and family member in detail. All the questions were answered and concerns were addressed.

## 2018-08-09 ENCOUNTER — TRANSCRIPTION ENCOUNTER (OUTPATIENT)
Age: 68
End: 2018-08-09

## 2018-08-09 VITALS
OXYGEN SATURATION: 96 % | HEART RATE: 53 BPM | TEMPERATURE: 98 F | SYSTOLIC BLOOD PRESSURE: 139 MMHG | DIASTOLIC BLOOD PRESSURE: 65 MMHG | RESPIRATION RATE: 16 BRPM

## 2018-08-09 LAB
ANION GAP SERPL CALC-SCNC: 6 MMOL/L — SIGNIFICANT CHANGE UP (ref 5–17)
BUN SERPL-MCNC: 14 MG/DL — SIGNIFICANT CHANGE UP (ref 7–18)
CALCIUM SERPL-MCNC: 9.4 MG/DL — SIGNIFICANT CHANGE UP (ref 8.4–10.5)
CHLORIDE SERPL-SCNC: 103 MMOL/L — SIGNIFICANT CHANGE UP (ref 96–108)
CO2 SERPL-SCNC: 29 MMOL/L — SIGNIFICANT CHANGE UP (ref 22–31)
CREAT SERPL-MCNC: 0.9 MG/DL — SIGNIFICANT CHANGE UP (ref 0.5–1.3)
GLUCOSE SERPL-MCNC: 102 MG/DL — HIGH (ref 70–99)
HCT VFR BLD CALC: 44.6 % — SIGNIFICANT CHANGE UP (ref 34.5–45)
HGB BLD-MCNC: 14.8 G/DL — SIGNIFICANT CHANGE UP (ref 11.5–15.5)
MCHC RBC-ENTMCNC: 31.3 PG — SIGNIFICANT CHANGE UP (ref 27–34)
MCHC RBC-ENTMCNC: 33.1 GM/DL — SIGNIFICANT CHANGE UP (ref 32–36)
MCV RBC AUTO: 94.5 FL — SIGNIFICANT CHANGE UP (ref 80–100)
PLATELET # BLD AUTO: 222 K/UL — SIGNIFICANT CHANGE UP (ref 150–400)
POTASSIUM SERPL-MCNC: 4.1 MMOL/L — SIGNIFICANT CHANGE UP (ref 3.5–5.3)
POTASSIUM SERPL-SCNC: 4.1 MMOL/L — SIGNIFICANT CHANGE UP (ref 3.5–5.3)
RBC # BLD: 4.72 M/UL — SIGNIFICANT CHANGE UP (ref 3.8–5.2)
RBC # FLD: 11.9 % — SIGNIFICANT CHANGE UP (ref 10.3–14.5)
SODIUM SERPL-SCNC: 138 MMOL/L — SIGNIFICANT CHANGE UP (ref 135–145)
WBC # BLD: 6 K/UL — SIGNIFICANT CHANGE UP (ref 3.8–10.5)
WBC # FLD AUTO: 6 K/UL — SIGNIFICANT CHANGE UP (ref 3.8–10.5)

## 2018-08-09 PROCEDURE — 84100 ASSAY OF PHOSPHORUS: CPT

## 2018-08-09 PROCEDURE — 80048 BASIC METABOLIC PNL TOTAL CA: CPT

## 2018-08-09 PROCEDURE — 93017 CV STRESS TEST TRACING ONLY: CPT

## 2018-08-09 PROCEDURE — 93306 TTE W/DOPPLER COMPLETE: CPT

## 2018-08-09 PROCEDURE — 78452 HT MUSCLE IMAGE SPECT MULT: CPT

## 2018-08-09 PROCEDURE — 82550 ASSAY OF CK (CPK): CPT

## 2018-08-09 PROCEDURE — 70551 MRI BRAIN STEM W/O DYE: CPT | Mod: 26

## 2018-08-09 PROCEDURE — 82607 VITAMIN B-12: CPT

## 2018-08-09 PROCEDURE — 83735 ASSAY OF MAGNESIUM: CPT

## 2018-08-09 PROCEDURE — 80053 COMPREHEN METABOLIC PANEL: CPT

## 2018-08-09 PROCEDURE — 93005 ELECTROCARDIOGRAM TRACING: CPT

## 2018-08-09 PROCEDURE — 84484 ASSAY OF TROPONIN QUANT: CPT

## 2018-08-09 PROCEDURE — A9502: CPT

## 2018-08-09 PROCEDURE — 83036 HEMOGLOBIN GLYCOSYLATED A1C: CPT

## 2018-08-09 PROCEDURE — 85027 COMPLETE CBC AUTOMATED: CPT

## 2018-08-09 PROCEDURE — 82553 CREATINE MB FRACTION: CPT

## 2018-08-09 PROCEDURE — G0378: CPT

## 2018-08-09 PROCEDURE — 99285 EMERGENCY DEPT VISIT HI MDM: CPT | Mod: 25

## 2018-08-09 PROCEDURE — 84443 ASSAY THYROID STIM HORMONE: CPT

## 2018-08-09 PROCEDURE — 82962 GLUCOSE BLOOD TEST: CPT

## 2018-08-09 PROCEDURE — 80061 LIPID PANEL: CPT

## 2018-08-09 PROCEDURE — 71046 X-RAY EXAM CHEST 2 VIEWS: CPT

## 2018-08-09 PROCEDURE — 82306 VITAMIN D 25 HYDROXY: CPT

## 2018-08-09 PROCEDURE — 70551 MRI BRAIN STEM W/O DYE: CPT

## 2018-08-09 PROCEDURE — 99223 1ST HOSP IP/OBS HIGH 75: CPT

## 2018-08-09 RX ORDER — METOPROLOL TARTRATE 50 MG
1 TABLET ORAL
Qty: 30 | Refills: 0
Start: 2018-08-09 | End: 2018-09-07

## 2018-08-09 RX ORDER — IPRATROPIUM/ALBUTEROL SULFATE 18-103MCG
3 AEROSOL WITH ADAPTER (GRAM) INHALATION EVERY 6 HOURS
Qty: 0 | Refills: 0 | Status: DISCONTINUED | OUTPATIENT
Start: 2018-08-09 | End: 2018-08-09

## 2018-08-09 RX ORDER — ALBUTEROL 90 UG/1
2 AEROSOL, METERED ORAL
Qty: 1 | Refills: 0
Start: 2018-08-09 | End: 2018-09-07

## 2018-08-09 RX ADMIN — Medication 12.5 MILLIGRAM(S): at 06:05

## 2018-08-09 RX ADMIN — VALSARTAN 320 MILLIGRAM(S): 80 TABLET ORAL at 07:18

## 2018-08-09 RX ADMIN — Medication 81 MILLIGRAM(S): at 11:54

## 2018-08-09 RX ADMIN — ENOXAPARIN SODIUM 40 MILLIGRAM(S): 100 INJECTION SUBCUTANEOUS at 11:54

## 2018-08-09 NOTE — DISCHARGE NOTE ADULT - PLAN OF CARE
Rule out ACS You presented to the ED with complaint of pressure in your chest. Your EKG showed some abnormalities. Your cardiac enzymes were negative. Your echocardiogram was normal and your ejection fraction was 55%. Your stress test was negative. You also complained of dizziness when you came to the hospital. MRI of your head was negative. We have ruled out acute coronary syndrome as the cause of your discomfort and also ruled out any neurologic source of your dizziness. BP Control You have a history of hypertension. Continue taking HCTZ, Valsartan and metoprolol outpatient as prescribed and follow up with your primary doctor in 1 week. Symptom relief You have a history of asthma, and you complained of a cough in the hospital. We are prescribing you with a ventolin inhaler and prednisone that you will take for 5 days. Follow up with pulmonology in 1 week.

## 2018-08-09 NOTE — PROGRESS NOTE ADULT - SUBJECTIVE AND OBJECTIVE BOX
Patient is a 67y old  Female who presents with a chief complaint of chest tightness (07 Aug 2018 21:03)    Awake , alert , comfortable in bed in NAD. With chest tightness and cough for a few days.    INTERVAL HPI/OVERNIGHT EVENTS:      VITAL SIGNS:  T(F): 97.7 (08-09-18 @ 07:37)  HR: 51 (08-09-18 @ 07:37)  BP: 148/66 (08-09-18 @ 07:37)  RR: 16 (08-09-18 @ 07:37)  SpO2: 98% (08-09-18 @ 07:37)  Wt(kg): --  I&O's Detail          REVIEW OF SYSTEMS:    CONSTITUTIONAL:  No fevers, chills, sweats    HEENT:  Eyes:  No diplopia or blurred vision. ENT:  No earache, sore throat or runny nose.    CARDIOVASCULAR:  No pressure, squeezing, tightness, or heaviness about the chest; no palpitations.    RESPIRATORY:  Per HPI    GASTROINTESTINAL:  No abdominal pain, nausea, vomiting or diarrhea.    GENITOURINARY:  No dysuria, frequency or urgency.    NEUROLOGIC:  No paresthesias, fasciculations, seizures or weakness.    PSYCHIATRIC:  No disorder of thought or mood.      PHYSICAL EXAM:    Constitutional: Well developed and nourished  Eyes:Perrla  ENMT: normal  Neck:supple  Respiratory: CTA b/l  Cardiovascular: S1 S2 regular  Gastrointestinal: Soft, Non tender  Extremities: No edema  Vascular:normal  Neurological:Awake, alert,Ox3  Musculoskeletal:Normal      MEDICATIONS  (STANDING):  aspirin enteric coated 81 milliGRAM(s) Oral daily  atorvastatin 40 milliGRAM(s) Oral at bedtime  enoxaparin Injectable 40 milliGRAM(s) SubCutaneous daily  hydrochlorothiazide 12.5 milliGRAM(s) Oral daily  metoprolol succinate ER 25 milliGRAM(s) Oral daily  valsartan 320 milliGRAM(s) Oral <User Schedule>    MEDICATIONS  (PRN):      Allergies    No Known Allergies    Intolerances        LABS:                        14.8   6.0   )-----------( 222      ( 09 Aug 2018 06:58 )             44.6     08-09    138  |  103  |  14  ----------------------------<  102<H>  4.1   |  29  |  0.90    Ca    9.4      09 Aug 2018 06:58  Phos  4.1     08-08  Mg     2.2     08-08    TPro  7.5  /  Alb  3.8  /  TBili  0.5  /  DBili  x   /  AST  27  /  ALT  34  /  AlkPhos  95  08-07          CARDIAC MARKERS ( 08 Aug 2018 07:29 )  <0.015 ng/mL / x     / 153 U/L / x     / 1.1 ng/mL  CARDIAC MARKERS ( 07 Aug 2018 19:42 )  <0.015 ng/mL / x     / 173 U/L / x     / 1.4 ng/mL  CARDIAC MARKERS ( 07 Aug 2018 13:37 )  <0.015 ng/mL / x     / x     / x     / x          CAPILLARY BLOOD GLUCOSE            RADIOLOGY & ADDITIONAL TESTS:    CXR:  < from: Xray Chest 2 Views PA/Lat (08.07.18 @ 13:48) >  IMPRESSION:  No infiltrate.    < end of copied text >    Ct scan chest:    ekg;  < from: 12 Lead ECG (08.07.18 @ 14:53) >    Ventricular Rate 80 BPM    Atrial Rate 80 BPM    P-R Interval 146 ms    QRS Duration 92 ms     ms    QTc 442 ms    P Axis 45 degrees    R Axis -21 degrees    T Axis 39 degrees    Diagnosis Line Normal sinus rhythm  Possible Left atrial enlargement  T wave abnormality, consider lateral ischemia  Abnormal ECG  Confirmed by LOVELY PAREDES, [facility] ([id number]) on 08-Aug-2018 17:42:21    < end of copied text >    echo:  < from: Transthoracic Echocardiogram (08.08.18 @ 15:58) >  CONCLUSIONS:  1. Mild mitral regurgitation.  2.  Trace aortic regurgitation.  3. Mildly dilated left atrium.  LA volume index = 37 cc/m2.  4. Mild concentric left ventricular hypertrophy.  5. Normal left ventricular function. Mild diastolic  dysfunction (stage I).  6. Normal right ventricular size and function.    < end of copied text > Patient is a 67y old  Female who presents with a chief complaint of chest tightness (07 Aug 2018 21:03)  Awake , alert , comfortable in bed in NAD. Still with cough but sob improved. Had Ct head today.    INTERVAL HPI/OVERNIGHT EVENTS:      VITAL SIGNS:  T(F): 97.7 (08-09-18 @ 07:37)  HR: 51 (08-09-18 @ 07:37)  BP: 148/66 (08-09-18 @ 07:37)  RR: 16 (08-09-18 @ 07:37)  SpO2: 98% (08-09-18 @ 07:37)  Wt(kg): --  I&O's Detail          REVIEW OF SYSTEMS:    CONSTITUTIONAL:  No fevers, chills, sweats    HEENT:  Eyes:  No diplopia or blurred vision. ENT:  No earache, sore throat or runny nose.    CARDIOVASCULAR:  No pressure, squeezing, tightness, or heaviness about the chest; no palpitations.    RESPIRATORY:  Per HPI    GASTROINTESTINAL:  No abdominal pain, nausea, vomiting or diarrhea.    GENITOURINARY:  No dysuria, frequency or urgency.    NEUROLOGIC:  No paresthesias, fasciculations, seizures or weakness.    PSYCHIATRIC:  No disorder of thought or mood.      PHYSICAL EXAM:    Constitutional: Well developed and nourished  Eyes:Perrla  ENMT: normal  Neck:supple  Respiratory: CTA b/l  Cardiovascular: S1 S2 regular  Gastrointestinal: Soft, Non tender  Extremities: No edema  Vascular:normal  Neurological:Awake, alert,Ox3  Musculoskeletal:Normal      MEDICATIONS  (STANDING):  aspirin enteric coated 81 milliGRAM(s) Oral daily  atorvastatin 40 milliGRAM(s) Oral at bedtime  enoxaparin Injectable 40 milliGRAM(s) SubCutaneous daily  hydrochlorothiazide 12.5 milliGRAM(s) Oral daily  metoprolol succinate ER 25 milliGRAM(s) Oral daily  valsartan 320 milliGRAM(s) Oral <User Schedule>    MEDICATIONS  (PRN):      Allergies    No Known Allergies    Intolerances        LABS:                        14.8   6.0   )-----------( 222      ( 09 Aug 2018 06:58 )             44.6     08-09    138  |  103  |  14  ----------------------------<  102<H>  4.1   |  29  |  0.90    Ca    9.4      09 Aug 2018 06:58  Phos  4.1     08-08  Mg     2.2     08-08    TPro  7.5  /  Alb  3.8  /  TBili  0.5  /  DBili  x   /  AST  27  /  ALT  34  /  AlkPhos  95  08-07          CARDIAC MARKERS ( 08 Aug 2018 07:29 )  <0.015 ng/mL / x     / 153 U/L / x     / 1.1 ng/mL  CARDIAC MARKERS ( 07 Aug 2018 19:42 )  <0.015 ng/mL / x     / 173 U/L / x     / 1.4 ng/mL  CARDIAC MARKERS ( 07 Aug 2018 13:37 )  <0.015 ng/mL / x     / x     / x     / x          CAPILLARY BLOOD GLUCOSE            RADIOLOGY & ADDITIONAL TESTS:    CXR:  < from: Xray Chest 2 Views PA/Lat (08.07.18 @ 13:48) >  IMPRESSION:  No infiltrate.    < end of copied text >    Ct scan chest:    ekg;  < from: 12 Lead ECG (08.07.18 @ 14:53) >    Ventricular Rate 80 BPM    Atrial Rate 80 BPM    P-R Interval 146 ms    QRS Duration 92 ms     ms    QTc 442 ms    P Axis 45 degrees    R Axis -21 degrees    T Axis 39 degrees    Diagnosis Line Normal sinus rhythm  Possible Left atrial enlargement  T wave abnormality, consider lateral ischemia  Abnormal ECG  Confirmed by LOVELY PAREDES, [facility] ([id number]) on 08-Aug-2018 17:42:21    < end of copied text >    echo:  < from: Transthoracic Echocardiogram (08.08.18 @ 15:58) >  CONCLUSIONS:  1. Mild mitral regurgitation.  2.  Trace aortic regurgitation.  3. Mildly dilated left atrium.  LA volume index = 37 cc/m2.  4. Mild concentric left ventricular hypertrophy.  5. Normal left ventricular function. Mild diastolic  dysfunction (stage I).  6. Normal right ventricular size and function.    < end of copied text >

## 2018-08-09 NOTE — DISCHARGE NOTE ADULT - CARE PROVIDER_API CALL
Calin Marcial), Internal Medicine; Pulmonary Disease  [address] [place] [address]  Phone: [phone]  Fax: [phone]

## 2018-08-09 NOTE — PROGRESS NOTE ADULT - ASSESSMENT
67 yr old  Occitan speaking female with PMHX of HTN, HLD, Asthma presented with chest tightness with numbness in left arm and left hand.   1.Tele monitoring.  2.Neurology f/u.  3.Stress test-r/o ischemia.  4.HTN-cont arb, b blocker, hctz 12.5mg qd.  5.Asthma-stable.  6.Cont asa,statin.  7.GI and DVT prophylaxis.

## 2018-08-09 NOTE — PROGRESS NOTE ADULT - ATTENDING COMMENTS
67--year-old woman with past medical history of cerebellar infarct presented with left arm numbness. I reviewed MRI no evidence of acute ischemia.  She has complete resolution of symptoms and nonfocal neurological exam.  Aggressive risk factor modification should be continued for  stroke prevention, consisting of antithrombotic therapy , intensive blood pressure control and lipid lowering therapy.I encouraged the patient to discuss these important issues with her primary care doctor.I have reviewed the goals of stroke risk factor modification. I counseled the patient on measures to reduce stroke risk, including the importance of medication compliance, risk factor control, exercise, healthy diet and avoidance of smoking. I reviewed stroke warning signs and symptoms and appropriate actions to take if such occur.

## 2018-08-09 NOTE — PROGRESS NOTE ADULT - SUBJECTIVE AND OBJECTIVE BOX
Follow Up Visit Note:  Pt seen w/ dtr at bedside after MRI and denied numbness or weird head feeling today          Allergies    No Known Allergies    Intolerances        Social History: [ ] Tobacco, [ ] Alcohol        MEDICATIONS  (STANDING):  ALBUTerol/ipratropium for Nebulization 3 milliLiter(s) Nebulizer every 6 hours  aspirin enteric coated 81 milliGRAM(s) Oral daily  atorvastatin 40 milliGRAM(s) Oral at bedtime  enoxaparin Injectable 40 milliGRAM(s) SubCutaneous daily  hydrochlorothiazide 12.5 milliGRAM(s) Oral daily  metoprolol succinate ER 25 milliGRAM(s) Oral daily  valsartan 320 milliGRAM(s) Oral <User Schedule>        Review of Systems:  General: [ ] None, [ ] chills, [ ]fatigue, [ ] fevers  Skin: [ ] None, [ ] rash   HEENT: [ ] None, [ ] head injury, [ ] blurred vision, [ ] double vision, [ ] eye pain, [ ] visual loss, [ ] hearing loss, [ ] deafness, [ ] ear pain, [ ] ringing in the ears, [ ] vertigo, [ ] sinus pain, [ ] voice changes  Neck: [ ] None, [ ] neck stiffness  Respiratory: [ ] None, [ ] cough, [ ] difficulty breathing  Cardiovascular: [ ] None, [ ] calf cramps, [ ] chest pain, [ ] leg pain, [ ] swelling, [ ] rapid heart rate, [ ] shortness of breath  Gastrointestinal: [ ] None, [ ] abdominal pain, [ ] nausea, [ ] vomiting  Musculoskeletal: [ ] None, [ ] back pain, [ ] joint pain, [ ] joint stiffness, [ ] leg cramps, [ ] muscle atrophy, [ ] muscle cramps, [ ] muscle weakness, [ ] swelling of extremities  Neurological: [ ] None, [ ] Dizziness, [ ] decreased memory, [ ] fainting, [ ] focal neurological symptoms, [ ] headaches, [ ] incontinence of stool, [ ] incontinence of urine, [ ] loss of consciousness, [ ] numbness, [ ] seizures, [ ] spinning sensation, [ ] stroke, [ ] trouble walking, [ ] unsteadiness, [ ] visual changes, [ ] weakness  Psychiatric: [ ] None,  [ ] depression, [ ] anxiety, [ ] hallucinations, [ ] inability to concentrate, [ ] mood changes, [ ] panic attacks  Hematology: [ ] None,  [ ] blood clots, [ ] spontaneous bleeding    [X ] None except marked above    Vital Signs  Vital Signs Last 24 Hrs  T(C): 36.4 (09 Aug 2018 15:21), Max: 36.7 (09 Aug 2018 01:27)  T(F): 97.6 (09 Aug 2018 15:21), Max: 98 (09 Aug 2018 01:27)  HR: 53 (09 Aug 2018 15:21) (49 - 56)  BP: 139/65 (09 Aug 2018 15:21) (129/73 - 159/77)  BP(mean): --  RR: 16 (09 Aug 2018 15:21) (16 - 20)  SpO2: 96% (09 Aug 2018 15:21) (96% - 100%)      Neurological Exam:    Mental Status -  Alert, Awake  Fund of Knowledge – normal  Affect- Appropriate  Recent Memory – Normal, Memory loss [ ] Mild, [ ] Moderate, [ ] Severe  Remote Memory – Normal  Attention Span – Normal  Concentration –  Normal  Cognitive function – Normal  Speech – Normal  Thought content/perception – Normal    Cranial Nerves:  II Optic: Visual acuity – Bilateral - Normal ; Visual fields – normal ; Fundi – Bilateral – no optic atrophy or Papilledema  III Oculomotor – normal bilaterally  IV Trochlear – Bilateral – normal  V Trigeminal: Ophthalmic – Bilateral – normal  ;   Maxillary – Bilateral- normal ;  Mandibular – Bilateral - normal  VI Abducens – Bilateral - normal  VII Facial: Normal bilaterally  VIII Acoustic - Bilateral – hearing normal and (hearing tested by finger rub)  IX Glossopharyngeal / X Vagus: Uvula – normal  XI Accessory: normal shoulder shrug  XII Hypoglossal – bilaterally normal  Eye Movements: Gaze – Bilateral - normal    Nystagmus – Bilateral – none  Motor:  Bulk and Contour: normal  Tone: normal  Strength:                                                                        Delt           Bicep      Tricep                                                Upper Extremity: Right           5/5            5/5          5/5              5/5                                                                       Left             5/5            5/5          5/5              5/5                                                                                             HF              KE            KF                DF                                         Lower extremity: Right         5/5             5/5          5/5              5/5                                                                          Left           5/5             5/5          5/5              5/5  General Assessment of Reflexes: Right Wrist - 2+  ;  Left Wrist - 2+ ; Right Elbow - 2+ ;  Left Elbow - 2+ ;  Right Knee - 2+ ;  Left Knee - 2+ ;   Right Ankle – 2+ ; Left Ankle – 2+  Plantar Reflexes( Babinski) (L4-S2) – Bilateral – Flexion  Sensory:  Light Touch: Intact – globally  Pain: Intact – globally  Temperature: Intact – globally  Coordination – no impairment of heel-to-shin, impairment of finger-to-nose or impairment of rapid alternating movement  Gait – Normal tandem walking, normal heel walking and normal toes walking  Romberg’s sign: - Normal    Cogwheel Rigidity - absent  Resting tremors - absent  Bradykinesia - absent  Retropulsion - absent

## 2018-08-09 NOTE — PROGRESS NOTE ADULT - PROBLEM SELECTOR PLAN 4
Bronchodilators prn  inhaled steroids  pfts as OP. Bronchodilators prn  inhaled steroids  pfts as OP.  Trial of po prednisone upon DC because of persistent cough

## 2018-08-09 NOTE — PROGRESS NOTE ADULT - ASSESSMENT
68yo female w/ resolved numbness and w/o focal deficit  MRI w/ no acute findings.  However, chronic Cerebellum infarct  Neuro signing off 68yo female w/ resolved numbness and w/o focal deficit  MRI w/ no acute findings.  However, chronic Cerebellum infarct noticed.

## 2018-08-09 NOTE — DISCHARGE NOTE ADULT - CARE PLAN
Principal Discharge DX:	Chest pain, unspecified type  Goal:	Rule out ACS  Assessment and plan of treatment:	You presented to the ED with complaint of pressure in your chest. Your EKG showed some abnormalities. Your cardiac enzymes were negative. Your echocardiogram was normal and your ejection fraction was 55%. Your stress test was negative. You also complained of dizziness when you came to the hospital. MRI of your head was negative. We have ruled out acute coronary syndrome as the cause of your discomfort and also ruled out any neurologic source of your dizziness.  Secondary Diagnosis:	HTN (hypertension)  Goal:	BP Control  Assessment and plan of treatment:	You have a history of hypertension. Continue taking HCTZ, Valsartan and metoprolol outpatient as prescribed and follow up with your primary doctor in 1 week.  Secondary Diagnosis:	Asthma  Goal:	Symptom relief  Assessment and plan of treatment:	You have a history of asthma, and you complained of a cough in the hospital. We are prescribing you with a ventolin inhaler and prednisone that you will take for 5 days. Follow up with pulmonology in 1 week.

## 2018-08-09 NOTE — DISCHARGE NOTE ADULT - PATIENT PORTAL LINK FT
You can access the [facility], offered by [facility], by registering with the following website: [email or web address]

## 2018-08-09 NOTE — DISCHARGE NOTE ADULT - MEDICATION SUMMARY - MEDICATIONS TO TAKE
I will START or STAY ON the medications listed below when I get home from the hospital:    predniSONE 20 mg oral tablet  -- 2 tab(s) by mouth once a day   -- It is very important that you take or use this exactly as directed.  Do not skip doses or discontinue unless directed by your doctor.  Obtain medical advice before taking any non-prescription drugs as some may affect the action of this medication.  Take with food or milk.    -- Indication: For Asthma    Aspir 81 oral delayed release tablet  -- 1 tab(s) by mouth once a day  -- Indication: For Prophylactic measure    valsartan 320 mg oral tablet  -- 1 tab(s) by mouth once a day  -- Indication: For Hypertension    Lipitor 20 mg oral tablet  -- 1 tab(s) by mouth once a day  -- Indication: For Prophylactic measure    metoprolol succinate 25 mg oral tablet, extended release  -- 1 tab(s) by mouth once a day  -- Indication: For Hypertension    Ventolin HFA 90 mcg/inh inhalation aerosol  -- 2 puff(s) inhaled every 6 hours, As Needed   -- For inhalation only.  It is very important that you take or use this exactly as directed.  Do not skip doses or discontinue unless directed by your doctor.  Obtain medical advice before taking any non-prescription drugs as some may affect the action of this medication.  Shake well before use.    -- Indication: For Asthma    hydroCHLOROthiazide 12.5 mg oral capsule  -- 1 cap(s) by mouth once a day  -- Indication: For Hypertension

## 2018-08-09 NOTE — PROGRESS NOTE ADULT - SUBJECTIVE AND OBJECTIVE BOX
Patient is a 67y old  Female who presents with a chief complaint of chest tightness (07 Aug 2018 21:03)      pt seen in ed hold [  ], on tele [ x  ], bed [ x ], chair at bedside [   ], a+o x3 [ x ], lethargic [  ],  nad [x  ]    Allergies    No Known Allergies        Vitals    T(F): 97.7 (08-09-18 @ 07:37), Max: 98 (08-09-18 @ 01:27)  HR: 51 (08-09-18 @ 07:37) (49 - 61)  BP: 148/66 (08-09-18 @ 07:37) (129/73 - 159/77)  RR: 16 (08-09-18 @ 07:37) (16 - 20)  SpO2: 98% (08-09-18 @ 07:37) (97% - 100%)  Wt(kg): --  CAPILLARY BLOOD GLUCOSE      POCT Blood Glucose.: 107 mg/dL (07 Aug 2018 12:49)      Labs                          14.8   6.0   )-----------( 222      ( 09 Aug 2018 06:58 )             44.6       08-09    138  |  103  |  14  ----------------------------<  102<H>  4.1   |  29  |  0.90    Ca    9.4      09 Aug 2018 06:58  Phos  4.1     08-08  Mg     2.2     08-08    TPro  7.5  /  Alb  3.8  /  TBili  0.5  /  DBili  x   /  AST  27  /  ALT  34  /  AlkPhos  95  08-07      CARDIAC MARKERS ( 08 Aug 2018 07:29 )  <0.015 ng/mL / x     / 153 U/L / x     / 1.1 ng/mL  CARDIAC MARKERS ( 07 Aug 2018 19:42 )  <0.015 ng/mL / x     / 173 U/L / x     / 1.4 ng/mL  CARDIAC MARKERS ( 07 Aug 2018 13:37 )  <0.015 ng/mL / x     / x     / x     / x                Radiology Results      Meds    MEDICATIONS  (STANDING):  aspirin enteric coated 81 milliGRAM(s) Oral daily  atorvastatin 40 milliGRAM(s) Oral at bedtime  enoxaparin Injectable 40 milliGRAM(s) SubCutaneous daily  hydrochlorothiazide 12.5 milliGRAM(s) Oral daily  metoprolol succinate ER 25 milliGRAM(s) Oral daily  valsartan 320 milliGRAM(s) Oral <User Schedule>      MEDICATIONS  (PRN):      Physical Exam    Neuro :  no focal deficits  Respiratory: CTA B/L  CV: RRR, S1S2, no murmurs,   Abdominal: Soft, NT, ND +BS,  Extremities: No edema, + peripheral pulses    ASSESSMENT    uncontrolled htn with hypertensive urgency  Chest pain and left ue numbness  r/o acs  r/o cns patho  h/o Asthma  HLD (hyperlipidemia)  HTN (hypertension)  No significant past surgical history      PLAN    cont tele  cont statin and aspirin  cont lopressor and valsartan  cardio cons  f/u echo  ce q8 x3 neg noted above  neuro cons  f/u mri head  pulm cons  cont current meds Patient is a 67y old  Female who presents with a chief complaint of chest tightness (07 Aug 2018 21:03)      pt seen in ed hold [  ], on tele [ x  ], bed [  ], chair at bedside [ x  ], a+o x3 [ x ], lethargic [  ],  nad [x  ]    Allergies    No Known Allergies        Vitals    T(F): 97.7 (08-09-18 @ 07:37), Max: 98 (08-09-18 @ 01:27)  HR: 51 (08-09-18 @ 07:37) (49 - 61)  BP: 148/66 (08-09-18 @ 07:37) (129/73 - 159/77)  RR: 16 (08-09-18 @ 07:37) (16 - 20)  SpO2: 98% (08-09-18 @ 07:37) (97% - 100%)  Wt(kg): --  CAPILLARY BLOOD GLUCOSE      POCT Blood Glucose.: 107 mg/dL (07 Aug 2018 12:49)      Labs                          14.8   6.0   )-----------( 222      ( 09 Aug 2018 06:58 )             44.6       08-09    138  |  103  |  14  ----------------------------<  102<H>  4.1   |  29  |  0.90    Ca    9.4      09 Aug 2018 06:58  Phos  4.1     08-08  Mg     2.2     08-08    TPro  7.5  /  Alb  3.8  /  TBili  0.5  /  DBili  x   /  AST  27  /  ALT  34  /  AlkPhos  95  08-07      CARDIAC MARKERS ( 08 Aug 2018 07:29 )  <0.015 ng/mL / x     / 153 U/L / x     / 1.1 ng/mL  CARDIAC MARKERS ( 07 Aug 2018 19:42 )  <0.015 ng/mL / x     / 173 U/L / x     / 1.4 ng/mL  CARDIAC MARKERS ( 07 Aug 2018 13:37 )  <0.015 ng/mL / x     / x     / x     / x          < from: Transthoracic Echocardiogram (08.08.18 @ 15:58) >  CONCLUSIONS:  1. Mild mitral regurgitation.  2.  Trace aortic regurgitation.  3. Mildly dilated left atrium.  LA volume index = 37 cc/m2.  4. Mild concentric left ventricular hypertrophy.  5. Normal left ventricular function. Mild diastolic  dysfunction (stage I).  6. Normal right ventricular size and function.    < from: Transthoracic Echocardiogram (08.08.18 @ 15:58) >  Ejection Fraction Visual Estimate: >55 %    < end of copied text >          Radiology Results      Meds    MEDICATIONS  (STANDING):  aspirin enteric coated 81 milliGRAM(s) Oral daily  atorvastatin 40 milliGRAM(s) Oral at bedtime  enoxaparin Injectable 40 milliGRAM(s) SubCutaneous daily  hydrochlorothiazide 12.5 milliGRAM(s) Oral daily  metoprolol succinate ER 25 milliGRAM(s) Oral daily  valsartan 320 milliGRAM(s) Oral <User Schedule>      MEDICATIONS  (PRN):      Physical Exam    Neuro :  no focal deficits  Respiratory: CTA B/L  CV: RRR, S1S2, no murmurs,   Abdominal: Soft, NT, ND +BS,  Extremities: No edema, + peripheral pulses    ASSESSMENT    uncontrolled htn with hypertensive urgency  Chest pain and left ue numbness  r/o acs  r/o cns patho  h/o Asthma  HLD (hyperlipidemia)  HTN (hypertension)  No significant past surgical history      PLAN    cont tele  cont statin and aspirin  cont lopressor, hctz and valsartan  cardio f/u noted  bp improved  echo with lvef >55%, grade 1 diastolic dysfunction noted above  f/u stress test  ce q8 x3 neg noted above  neuro f/u  f/u mri head  pulm f/u  cont current meds  d/c plan pending stress test and mri results

## 2018-08-09 NOTE — PROGRESS NOTE ADULT - SUBJECTIVE AND OBJECTIVE BOX
CHIEF COMPLAINT:Patient is a 67y old  Female who presents with a chief complaint of chest tightness.Pt appears comfortable.    	  REVIEW OF SYSTEMS:  CONSTITUTIONAL: No fever, weight loss, or fatigue  EYES: No eye pain, visual disturbances, or discharge  ENT:  No difficulty hearing, tinnitus, vertigo; No sinus or throat pain  NECK: No pain or stiffness  RESPIRATORY: No cough, wheezing, chills or hemoptysis; No Shortness of Breath  CARDIOVASCULAR: No chest pain, palpitations, passing out, dizziness, or leg swelling  GASTROINTESTINAL: No abdominal or epigastric pain. No nausea, vomiting, or hematemesis; No diarrhea or constipation. No melena or hematochezia.  GENITOURINARY: No dysuria, frequency, hematuria, or incontinence  NEUROLOGICAL: No headaches, memory loss, loss of strength, numbness, or tremors  SKIN: No itching, burning, rashes, or lesions   LYMPH Nodes: No enlarged glands  ENDOCRINE: No heat or cold intolerance; No hair loss  MUSCULOSKELETAL: No joint pain or swelling; No muscle, back, or extremity pain  PSYCHIATRIC: No depression, anxiety, mood swings, or difficulty sleeping  HEME/LYMPH: No easy bruising, or bleeding gums  ALLERGY AND IMMUNOLOGIC: No hives or eczema	      PHYSICAL EXAM:  T(C): 36.5 (08-09-18 @ 07:37), Max: 36.7 (08-09-18 @ 01:27)  HR: 51 (08-09-18 @ 07:37) (49 - 61)  BP: 148/66 (08-09-18 @ 07:37) (129/73 - 159/77)  RR: 16 (08-09-18 @ 07:37) (16 - 20)  SpO2: 98% (08-09-18 @ 07:37) (97% - 100%)  Wt(kg): --  I&O's Summary      Appearance: Normal	  HEENT:   Normal oral mucosa, PERRL, EOMI	  Lymphatic: No lymphadenopathy  Cardiovascular: Normal S1 S2, No JVD, No murmurs, No edema  Respiratory: Lungs clear to auscultation	  Psychiatry: A & O x 3, Mood & affect appropriate  Gastrointestinal:  Soft, Non-tender, + BS	  Skin: No rashes, No ecchymoses, No cyanosis	  Neurologic: Non-focal  Extremities: Normal range of motion, No clubbing, cyanosis or edema  Vascular: Peripheral pulses palpable 2+ bilaterally    MEDICATIONS  (STANDING):  aspirin enteric coated 81 milliGRAM(s) Oral daily  atorvastatin 40 milliGRAM(s) Oral at bedtime  enoxaparin Injectable 40 milliGRAM(s) SubCutaneous daily  hydrochlorothiazide 12.5 milliGRAM(s) Oral daily  metoprolol succinate ER 25 milliGRAM(s) Oral daily  valsartan 320 milliGRAM(s) Oral <User Schedule>      	  LABS:	 	    CARDIAC MARKERS:  CARDIAC MARKERS ( 08 Aug 2018 07:29 )  <0.015 ng/mL / x     / 153 U/L / x     / 1.1 ng/mL  CARDIAC MARKERS ( 07 Aug 2018 19:42 )  <0.015 ng/mL / x     / 173 U/L / x     / 1.4 ng/mL  CARDIAC MARKERS ( 07 Aug 2018 13:37 )  <0.015 ng/mL / x     / x     / x     / x                                    14.8   6.0   )-----------( 222      ( 09 Aug 2018 06:58 )             44.6     08-09    138  |  103  |  14  ----------------------------<  102<H>  4.1   |  29  |  0.90    Ca    9.4      09 Aug 2018 06:58  Phos  4.1     08-08  Mg     2.2     08-08    TPro  7.5  /  Alb  3.8  /  TBili  0.5  /  DBili  x   /  AST  27  /  ALT  34  /  AlkPhos  95  08-07  Lipid Profile: Cholesterol 173  LDL 89  HDL 60      HgA1c: Hemoglobin A1C, Whole Blood: 5.8 % (08-08 @ 09:24)    TSH: Thyroid Stimulating Hormone, Serum: 3.22 uU/mL (08-08 @ 07:29)      	  OBSERVATIONS:  Mitral Valve: Normal mitral valve. Mild mitral  regurgitation.  Aortic Root: Normal aortic root.  Aortic Valve: Normal trileaflet aortic valve. Trace aortic  regurgitation.  Left Atrium: Mildly dilated left atrium.  LA volume index =  37 cc/m2.  Left Ventricle: Normal left ventricular function. Mild  diastolic dysfunction (stage I). Mild concentric left  ventricular hypertrophy.  Right Heart: Normal right atrium. Normal right ventricular  sizeand function. There is trace pulmonic regurgitation.  Pericardium/PleuraNormal pericardium with no pericardial  effusion.

## 2018-11-28 NOTE — PROGRESS NOTE ADULT - PROBLEM SELECTOR PLAN 1
Called and spoke to the patient and explained medication refilled and needs an appointment. I offered to schedule this, patient declined and will check her schedule and call us back.  Trinity Simental MA/  For Teams Timmy     R/o ACS protocol  ECHO  Cardio eval  Tele monitoring  ASA, BB, Statin. R/o ACS protocol  ECHO noted  Cardio follow up  Tele monitoring  ASA, BB, Statin.

## 2019-05-22 NOTE — DISCHARGE NOTE ADULT - FUNCTIONAL SCREEN CURRENT LEVEL: BATHING, MLM
Additional History: Pt had an ED&C performed on her left forearm to treat a scc. Pt stated it has been itchy and red around the edges. It has a clearish/yellow discharge. Pt states she has been applying Vaseline to the area daily. (0) independent

## 2019-12-05 ENCOUNTER — EMERGENCY (EMERGENCY)
Facility: HOSPITAL | Age: 69
LOS: 1 days | Discharge: HOME | End: 2019-12-05
Attending: EMERGENCY MEDICINE
Payer: COMMERCIAL

## 2019-12-05 VITALS
HEART RATE: 63 BPM | WEIGHT: 160.06 LBS | SYSTOLIC BLOOD PRESSURE: 145 MMHG | TEMPERATURE: 98 F | HEIGHT: 65 IN | DIASTOLIC BLOOD PRESSURE: 83 MMHG | OXYGEN SATURATION: 98 % | RESPIRATION RATE: 20 BRPM

## 2019-12-05 PROBLEM — J45.909 UNSPECIFIED ASTHMA, UNCOMPLICATED: Chronic | Status: ACTIVE | Noted: 2018-08-07

## 2019-12-05 PROBLEM — E78.5 HYPERLIPIDEMIA, UNSPECIFIED: Chronic | Status: ACTIVE | Noted: 2018-08-07

## 2019-12-05 PROBLEM — I10 ESSENTIAL (PRIMARY) HYPERTENSION: Chronic | Status: ACTIVE | Noted: 2018-08-07

## 2019-12-05 PROCEDURE — 93971 EXTREMITY STUDY: CPT

## 2019-12-05 PROCEDURE — 93971 EXTREMITY STUDY: CPT | Mod: 26,LT

## 2019-12-05 PROCEDURE — 73610 X-RAY EXAM OF ANKLE: CPT | Mod: 26,LT

## 2019-12-05 PROCEDURE — 99284 EMERGENCY DEPT VISIT MOD MDM: CPT | Mod: 25

## 2019-12-05 PROCEDURE — 73610 X-RAY EXAM OF ANKLE: CPT

## 2019-12-05 PROCEDURE — 99284 EMERGENCY DEPT VISIT MOD MDM: CPT

## 2019-12-05 NOTE — ED ADULT NURSE NOTE - PAIN RATING/NUMBER SCALE (0-10): ACTIVITY
4 [Alert] : alert [No Acute Distress] : no acute distress [Normocephalic] : normocephalic [Flat Open Anterior Sledge] : flat open anterior fontanelle [Nonicteric Sclera] : nonicteric sclera [Red Reflex Bilateral] : red reflex bilateral [Normally Placed Ears] : normally placed ears [Auricles Well Formed] : auricles well formed [Nares Patent] : nares patent [No Discharge] : no discharge [Palate Intact] : palate intact [Supple, full passive range of motion] : supple, full passive range of motion [No Palpable Masses] : no palpable masses [Symmetric Chest Rise] : symmetric chest rise [Clear to Ausculatation Bilaterally] : clear to auscultation bilaterally [Regular Rate and Rhythm] : regular rate and rhythm [S1, S2 present] : S1, S2 present [+2 Femoral Pulses] : +2 femoral pulses [No Murmurs] : no murmurs [NonTender] : non tender [Soft] : soft [Normoactive Bowel Sounds] : normoactive bowel sounds [Non Distended] : non distended [No Hepatomegaly] : no hepatomegaly [Umbilical Stump Dry, Clean, Intact] : umbilical stump dry, clean, intact [No Splenomegaly] : no splenomegaly [Gurmeet 1] : Gurmeet 1 [No Clitoromegaly] : no clitoromegaly [Normal Vaginal Introitus] : normal vaginal introitus [Patent] : patent [Normally Placed] : normally placed [No Abnormal Lymph Nodes Palpated] : no abnormal lymph nodes palpated [No Clavicular Crepitus] : no clavicular crepitus [Symmetric Flexed Extremities] : symmetric flexed extremities [Negative Barrett-Ortalani] : negative Barrett-Ortalani [No Spinal Dimple] : no spinal dimple [NoTuft of Hair] : no tuft of hair [Startle Reflex] : startle reflex [Suck Reflex] : suck reflex [Rooting] : rooting [Plantar Grasp] : plantar grasp [Palmar Grasp] : palmar grasp [Symmetric Melissa] : symmetric melissa [No Jaundice] : no jaundice

## 2019-12-05 NOTE — ED ADULT NURSE NOTE - NSFALLRSKINDICATORS_ED_ALL_ED
History and Physical    Patient:                 MRN#: [id number] FIN: [id number]  JASON GONZALEZ   Age:         37         Sex: F          : 1981  Author:      Ronald Chambers M.D.    DATE OF ADMISSION:     HISTORY OF PRESENT ILLNESS: The patient is a 37-year-old female.  The patient was sent to the emergency room by patient's primary care physician for possible blood transfusion.  The patient's hemoglobin was 5.8.  The patient was complaining of feeling of fatigue, tired, and weakness.  The patient has a history of heavy menstrual bleed.  These symptoms started a few months ago.  The patient was started on birth control pills recently.  The  patient also complains of lower back pain.  The patient has a history of headache.  The patient also has a constipation.  The patient has ultrasound of the pelvis done at [facility].  The patient was supposed to be followed with the patient's primary care physician or gynecologist on Tuesday.  When came through the ER, the patient's hemoglobin was 5.8.  The patient was started on blood transfusion.  The patient denies any fevers. The  patient denies any chills, denies any palpitations.  When the patient came through the emergency room, at that time, blood pressure was 118/79 mmHg.  The patient's electrolytes were within normal limits.  The patient's iron saturation was only 2%.    PAST MEDICAL HISTORY: Noncontributory.    PAST SURGICAL HISTORY: Noncontributory.    FAMILY HISTORY: Noncontributory.    ALLERGIES: No known drug allergies.    SOCIAL HISTORY: No smoking.  No alcohol.  No illegal drug use.    REVIEW OF SYSTEMS: As above.  All other systems have been reviewed and reported negative.    PHYSICAL EXAMINATION: GENERAL   The patient is currently awake, alert, oriented.     HEENT   Conjunctivae are pale.  Mucosa moist.  Sclerae are nonicteric.  Pharynx no exudate.  No oral thrush.     NECK   Trachea midline.  Neck is supple.     LUNGS   Decreased  breath sounds at the bases.     HEART   Regular rate and rhythm.  S1, S2.  PMI is not displaced.     ABDOMEN   Soft.  Bowel sounds are present.     EXTREMITIES   No edema on the feet.     NEUROLOGICAL   The patient is awake, alert, oriented.  Cranial nerves 2 through 12 intact.     SKIN   There is no rash.  No ulcer.     CHEST WALL   There is no deformity.    IMPRESSION: The patient was admitted through the emergency room with acute blood loss anemia.  The patient has a heavy menstrual bleed.  The patient has menorrhagia, currently on birth control pills.  Follow the H and H.  The patient has iron deficiency anemia, transfuse as needed.  The patient already has ultrasound of the pelvis done.  The patient will follow up with the patient's own primary care physician on discharge.          Ronald Chambers M.D.          Felipe  DP:  [id number]  DD:  03/23/2019 00:08:44  DT:  03/23/2019 04:31:03  Job #:  [id number]   no

## 2019-12-05 NOTE — ED PROVIDER NOTE - PATIENT PORTAL LINK FT
You can access the FollowMyHealth Patient Portal offered by [facility] by registering at the following website: [email or web address]. By joining [employer]’s FollowMyHealth portal, you will also be able to view your health information using other applications (apps) compatible with our system.

## 2019-12-05 NOTE — ED PROVIDER NOTE - PHYSICAL EXAMINATION
Circumferential swelling on the superior malleolar area bilaterally extending into the lower shin on the L side. No erythema, no streaking. Temperature on leg is equal to the R leg. +2 peripheral pulses.

## 2019-12-05 NOTE — ED PROVIDER NOTE - OBJECTIVE STATEMENT
68 y/o F pt with a PMHx of HTN (on HCTZ, Metoprolol, Amlodipine, Fosamax, and ASA), HLD, and OA and no PSHx presents to the ED c/o L posterior ankle and lower leg pain and swelling x3 days s/p 3.5 hour flight from the [place] last week. Pt denies trauma, falls, injuries, SOB, CP, dizziness, or any other acute complaints.

## 2019-12-05 NOTE — ED PROVIDER NOTE - PROGRESS NOTE DETAILS
Normal imaghing studies, will wrap ankle and advise elevation. Pt is well appearing walking with steady gait, stable for discharge and follow up without fail with medical doctor. I had a detailed discussion with the patient and/or guardian regarding the historical points, exam findings, and any diagnostic results supporting the discharge diagnosis. Pt educated on care and need for follow up. Strict return instructions and red flag signs and symptoms discussed with patient. Questions answered. Pt shows understanding of discharge information and agrees to follow.

## 2020-07-31 ENCOUNTER — EMERGENCY (EMERGENCY)
Facility: HOSPITAL | Age: 70
LOS: 1 days | Discharge: HOME | End: 2020-07-31
Attending: EMERGENCY MEDICINE
Payer: MEDICARE

## 2020-07-31 VITALS
OXYGEN SATURATION: 98 % | HEART RATE: 78 BPM | TEMPERATURE: 98 F | SYSTOLIC BLOOD PRESSURE: 169 MMHG | RESPIRATION RATE: 18 BRPM | DIASTOLIC BLOOD PRESSURE: 77 MMHG

## 2020-07-31 VITALS
WEIGHT: 167.55 LBS | TEMPERATURE: 97 F | RESPIRATION RATE: 17 BRPM | SYSTOLIC BLOOD PRESSURE: 205 MMHG | HEART RATE: 69 BPM | DIASTOLIC BLOOD PRESSURE: 82 MMHG | OXYGEN SATURATION: 99 %

## 2020-07-31 LAB
ALBUMIN SERPL ELPH-MCNC: 4 G/DL — SIGNIFICANT CHANGE UP (ref 3.5–5)
ALP SERPL-CCNC: 65 U/L — SIGNIFICANT CHANGE UP (ref 40–120)
ALT FLD-CCNC: 36 U/L DA — SIGNIFICANT CHANGE UP (ref 10–60)
ANION GAP SERPL CALC-SCNC: 5 MMOL/L — SIGNIFICANT CHANGE UP (ref 5–17)
APTT BLD: 31.7 SEC — SIGNIFICANT CHANGE UP (ref 27.5–35.5)
AST SERPL-CCNC: 27 U/L — SIGNIFICANT CHANGE UP (ref 10–40)
BASOPHILS # BLD AUTO: 0.03 K/UL — SIGNIFICANT CHANGE UP (ref 0–0.2)
BASOPHILS NFR BLD AUTO: 0.4 % — SIGNIFICANT CHANGE UP (ref 0–2)
BILIRUB SERPL-MCNC: 0.7 MG/DL — SIGNIFICANT CHANGE UP (ref 0.2–1.2)
BUN SERPL-MCNC: 10 MG/DL — SIGNIFICANT CHANGE UP (ref 7–18)
CALCIUM SERPL-MCNC: 8.7 MG/DL — SIGNIFICANT CHANGE UP (ref 8.4–10.5)
CHLORIDE SERPL-SCNC: 99 MMOL/L — SIGNIFICANT CHANGE UP (ref 96–108)
CO2 SERPL-SCNC: 31 MMOL/L — SIGNIFICANT CHANGE UP (ref 22–31)
CREAT SERPL-MCNC: 1.01 MG/DL — SIGNIFICANT CHANGE UP (ref 0.5–1.3)
EOSINOPHIL # BLD AUTO: 0.18 K/UL — SIGNIFICANT CHANGE UP (ref 0–0.5)
EOSINOPHIL NFR BLD AUTO: 2.2 % — SIGNIFICANT CHANGE UP (ref 0–6)
GLUCOSE SERPL-MCNC: 165 MG/DL — HIGH (ref 70–99)
HCT VFR BLD CALC: 44.3 % — SIGNIFICANT CHANGE UP (ref 34.5–45)
HGB BLD-MCNC: 14.9 G/DL — SIGNIFICANT CHANGE UP (ref 11.5–15.5)
IMM GRANULOCYTES NFR BLD AUTO: 0.2 % — SIGNIFICANT CHANGE UP (ref 0–1.5)
INR BLD: 1.04 RATIO — SIGNIFICANT CHANGE UP (ref 0.88–1.16)
LYMPHOCYTES # BLD AUTO: 1.95 K/UL — SIGNIFICANT CHANGE UP (ref 1–3.3)
LYMPHOCYTES # BLD AUTO: 24.3 % — SIGNIFICANT CHANGE UP (ref 13–44)
MCHC RBC-ENTMCNC: 31.2 PG — SIGNIFICANT CHANGE UP (ref 27–34)
MCHC RBC-ENTMCNC: 33.6 GM/DL — SIGNIFICANT CHANGE UP (ref 32–36)
MCV RBC AUTO: 92.9 FL — SIGNIFICANT CHANGE UP (ref 80–100)
MONOCYTES # BLD AUTO: 0.44 K/UL — SIGNIFICANT CHANGE UP (ref 0–0.9)
MONOCYTES NFR BLD AUTO: 5.5 % — SIGNIFICANT CHANGE UP (ref 2–14)
NEUTROPHILS # BLD AUTO: 5.42 K/UL — SIGNIFICANT CHANGE UP (ref 1.8–7.4)
NEUTROPHILS NFR BLD AUTO: 67.4 % — SIGNIFICANT CHANGE UP (ref 43–77)
NRBC # BLD: 0 /100 WBCS — SIGNIFICANT CHANGE UP (ref 0–0)
PLATELET # BLD AUTO: 231 K/UL — SIGNIFICANT CHANGE UP (ref 150–400)
POTASSIUM SERPL-MCNC: 3.4 MMOL/L — LOW (ref 3.5–5.3)
POTASSIUM SERPL-SCNC: 3.4 MMOL/L — LOW (ref 3.5–5.3)
PROT SERPL-MCNC: 8 G/DL — SIGNIFICANT CHANGE UP (ref 6–8.3)
PROTHROM AB SERPL-ACNC: 12.1 SEC — SIGNIFICANT CHANGE UP (ref 10.6–13.6)
RBC # BLD: 4.77 M/UL — SIGNIFICANT CHANGE UP (ref 3.8–5.2)
RBC # FLD: 12 % — SIGNIFICANT CHANGE UP (ref 10.3–14.5)
SODIUM SERPL-SCNC: 135 MMOL/L — SIGNIFICANT CHANGE UP (ref 135–145)
TROPONIN I SERPL-MCNC: <0.015 NG/ML — SIGNIFICANT CHANGE UP (ref 0–0.04)
WBC # BLD: 8.04 K/UL — SIGNIFICANT CHANGE UP (ref 3.8–10.5)
WBC # FLD AUTO: 8.04 K/UL — SIGNIFICANT CHANGE UP (ref 3.8–10.5)

## 2020-07-31 PROCEDURE — 70450 CT HEAD/BRAIN W/O DYE: CPT

## 2020-07-31 PROCEDURE — 70450 CT HEAD/BRAIN W/O DYE: CPT | Mod: 26

## 2020-07-31 PROCEDURE — 84484 ASSAY OF TROPONIN QUANT: CPT

## 2020-07-31 PROCEDURE — 85610 PROTHROMBIN TIME: CPT

## 2020-07-31 PROCEDURE — 85027 COMPLETE CBC AUTOMATED: CPT

## 2020-07-31 PROCEDURE — 99284 EMERGENCY DEPT VISIT MOD MDM: CPT | Mod: 25

## 2020-07-31 PROCEDURE — 96374 THER/PROPH/DIAG INJ IV PUSH: CPT

## 2020-07-31 PROCEDURE — 93010 ELECTROCARDIOGRAM REPORT: CPT

## 2020-07-31 PROCEDURE — 85730 THROMBOPLASTIN TIME PARTIAL: CPT

## 2020-07-31 PROCEDURE — 36415 COLL VENOUS BLD VENIPUNCTURE: CPT

## 2020-07-31 PROCEDURE — 80053 COMPREHEN METABOLIC PANEL: CPT

## 2020-07-31 PROCEDURE — 93005 ELECTROCARDIOGRAM TRACING: CPT

## 2020-07-31 RX ORDER — MECLIZINE HCL 12.5 MG
25 TABLET ORAL ONCE
Refills: 0 | Status: COMPLETED | OUTPATIENT
Start: 2020-07-31 | End: 2020-07-31

## 2020-07-31 RX ORDER — HYDRALAZINE HCL 50 MG
10 TABLET ORAL ONCE
Refills: 0 | Status: COMPLETED | OUTPATIENT
Start: 2020-07-31 | End: 2020-07-31

## 2020-07-31 RX ADMIN — Medication 10 MILLIGRAM(S): at 19:04

## 2020-07-31 NOTE — ED PROVIDER NOTE - CLINICAL SUMMARY MEDICAL DECISION MAKING FREE TEXT BOX
CT head negative for acute pathology. patient reports on additional history that she takes all her BP meds in the morning. symptoms may be secondary to acute drop in blood pressure. patient advised to stagger blood pressure medication throughout the day. appropriate for discharge home. strict precautions when to return to the ED given and understood.

## 2020-07-31 NOTE — ED ADULT NURSE NOTE - OBJECTIVE STATEMENT
Pt states has dizziness in the morning 1-2 hrs after taking BP meds  States takes 3 different meds  all at the same time  Denies being dizzy now and BP is elevated

## 2020-07-31 NOTE — ED ADULT NURSE NOTE - CHPI ED NUR SYMPTOMS NEG
no congestion/no dizziness/no syncope/no fever/no chest pain/no diaphoresis/no shortness of breath/no back pain/no nausea/no chills/no vomiting

## 2020-07-31 NOTE — ED PROVIDER NOTE - PATIENT PORTAL LINK FT
You can access the FollowMyHealth Patient Portal offered by [facility] by registering at the following website: [email or web address]. By joining [employer]’s FollowMyHealth portal, you will also be able to view your health information using other applications (apps) compatible with our system.

## 2020-07-31 NOTE — ED PROVIDER NOTE - OBJECTIVE STATEMENT
68 yo F presents for evaluation of dizziness. patient states she had one episode of dizziness yesterday morning very brief, poorly characterized, no further episodes throughout the day yesterday. today in the morning she had a repeat episode again very brief. no headache, no chest pain or shortness of breath.

## 2020-09-26 ENCOUNTER — APPOINTMENT (OUTPATIENT)
Dept: DISASTER EMERGENCY | Facility: CLINIC | Age: 70
End: 2020-09-26

## 2020-09-26 DIAGNOSIS — Z01.818 ENCOUNTER FOR OTHER PREPROCEDURAL EXAMINATION: ICD-10-CM

## 2020-09-27 LAB — SARS-COV-2 N GENE NPH QL NAA+PROBE: NOT DETECTED

## 2020-09-28 ENCOUNTER — OUTPATIENT (OUTPATIENT)
Dept: OUTPATIENT SERVICES | Facility: HOSPITAL | Age: 70
LOS: 1 days | Discharge: HOME | End: 2020-09-28
Payer: MEDICAID

## 2020-09-28 DIAGNOSIS — Z98.890 OTHER SPECIFIED POSTPROCEDURAL STATES: Chronic | ICD-10-CM

## 2020-09-28 DIAGNOSIS — I10 ESSENTIAL (PRIMARY) HYPERTENSION: ICD-10-CM

## 2020-09-28 DIAGNOSIS — Z90.710 ACQUIRED ABSENCE OF BOTH CERVIX AND UTERUS: Chronic | ICD-10-CM

## 2020-09-28 LAB
ANION GAP SERPL CALC-SCNC: 11 MMO/L — SIGNIFICANT CHANGE UP (ref 7–14)
BUN SERPL-MCNC: 9 MG/DL — SIGNIFICANT CHANGE UP (ref 7–23)
CALCIUM SERPL-MCNC: 9.6 MG/DL — SIGNIFICANT CHANGE UP (ref 8.4–10.5)
CHLORIDE SERPL-SCNC: 101 MMOL/L — SIGNIFICANT CHANGE UP (ref 98–107)
CO2 SERPL-SCNC: 29 MMOL/L — SIGNIFICANT CHANGE UP (ref 22–31)
CREAT SERPL-MCNC: 0.92 MG/DL — SIGNIFICANT CHANGE UP (ref 0.5–1.3)
GLUCOSE SERPL-MCNC: 97 MG/DL — SIGNIFICANT CHANGE UP (ref 70–99)
HBA1C BLD-MCNC: 5.7 % — HIGH (ref 4–5.6)
HCT VFR BLD CALC: 43.5 % — SIGNIFICANT CHANGE UP (ref 34.5–45)
HGB BLD-MCNC: 14 G/DL — SIGNIFICANT CHANGE UP (ref 11.5–15.5)
MCHC RBC-ENTMCNC: 31 PG — SIGNIFICANT CHANGE UP (ref 27–34)
MCHC RBC-ENTMCNC: 32.2 % — SIGNIFICANT CHANGE UP (ref 32–36)
MCV RBC AUTO: 96.5 FL — SIGNIFICANT CHANGE UP (ref 80–100)
NRBC # FLD: 0 K/UL — SIGNIFICANT CHANGE UP (ref 0–0)
PLATELET # BLD AUTO: 220 K/UL — SIGNIFICANT CHANGE UP (ref 150–400)
PMV BLD: 10.4 FL — SIGNIFICANT CHANGE UP (ref 7–13)
POTASSIUM SERPL-MCNC: 3.5 MMOL/L — SIGNIFICANT CHANGE UP (ref 3.5–5.3)
POTASSIUM SERPL-SCNC: 3.5 MMOL/L — SIGNIFICANT CHANGE UP (ref 3.5–5.3)
RBC # BLD: 4.51 M/UL — SIGNIFICANT CHANGE UP (ref 3.8–5.2)
RBC # FLD: 12.4 % — SIGNIFICANT CHANGE UP (ref 10.3–14.5)
SODIUM SERPL-SCNC: 141 MMOL/L — SIGNIFICANT CHANGE UP (ref 135–145)
WBC # BLD: 6.22 K/UL — SIGNIFICANT CHANGE UP (ref 3.8–10.5)
WBC # FLD AUTO: 6.22 K/UL — SIGNIFICANT CHANGE UP (ref 3.8–10.5)

## 2020-09-28 PROCEDURE — 93010 ELECTROCARDIOGRAM REPORT: CPT

## 2020-09-28 RX ORDER — SODIUM CHLORIDE 9 MG/ML
3 INJECTION INTRAMUSCULAR; INTRAVENOUS; SUBCUTANEOUS EVERY 8 HOURS
Refills: 0 | Status: DISCONTINUED | OUTPATIENT
Start: 2020-09-28 | End: 2020-10-12

## 2020-09-28 RX ORDER — AMLODIPINE BESYLATE 2.5 MG/1
1 TABLET ORAL
Qty: 0 | Refills: 0 | DISCHARGE

## 2020-09-28 NOTE — H&P CARDIOLOGY - HISTORY OF PRESENT ILLNESS
69 y/o F w/ PMH of HTN, HLD, OA, Asthma and ?TIA/CVA presents for cardiac catheretization. Pt states she has had several episodes of elevated blood pressure with associated chest tightness. Pt was evaluated at [place] where ACS was ruled out, echocardiogram showed normal LV function and stress test had no inducible ischemia. Pt denies N/V/D, fevers, chills, cough, palpitations, syncope, dyspnea on exertion, orthopnea, nocturnal paroxysmal dyspnea, edema, cyanosis, heart murmurs, varicosities, phlebitis, claudication.

## 2020-09-28 NOTE — H&P CARDIOLOGY - NEGATIVE CARDIOVASCULAR SYMPTOMS
no palpitations/no orthopnea/no claudication/no dyspnea on exertion/no paroxysmal nocturnal dyspnea/no peripheral edema

## 2020-09-28 NOTE — H&P CARDIOLOGY - RS GEN PE MLT RESP DETAILS PC
airway patent/good air movement/breath sounds equal/clear to auscultation bilaterally/no chest wall tenderness/respirations non-labored

## 2021-03-12 NOTE — H&P ADULT - ENMT
I have reviewed discharge instructions with the patient. The patient verbalized understanding.  Patient armband removed and shredded
No oral lesions; no gross abnormalities

## 2021-03-29 ENCOUNTER — INPATIENT (INPATIENT)
Facility: HOSPITAL | Age: 71
LOS: 1 days | Discharge: HOME | DRG: 202 | End: 2021-03-31
Attending: INTERNAL MEDICINE | Admitting: INTERNAL MEDICINE
Payer: MEDICARE

## 2021-03-29 VITALS
HEART RATE: 73 BPM | OXYGEN SATURATION: 96 % | WEIGHT: 158.07 LBS | DIASTOLIC BLOOD PRESSURE: 80 MMHG | RESPIRATION RATE: 18 BRPM | HEIGHT: 65 IN | SYSTOLIC BLOOD PRESSURE: 160 MMHG | TEMPERATURE: 98 F

## 2021-03-29 DIAGNOSIS — Z98.890 OTHER SPECIFIED POSTPROCEDURAL STATES: Chronic | ICD-10-CM

## 2021-03-29 DIAGNOSIS — Z90.710 ACQUIRED ABSENCE OF BOTH CERVIX AND UTERUS: Chronic | ICD-10-CM

## 2021-03-29 PROCEDURE — 99285 EMERGENCY DEPT VISIT HI MDM: CPT

## 2021-03-29 RX ORDER — ALBUTEROL 90 UG/1
4 AEROSOL, METERED ORAL ONCE
Refills: 0 | Status: COMPLETED | OUTPATIENT
Start: 2021-03-29 | End: 2021-03-29

## 2021-03-29 NOTE — ED PROVIDER NOTE - CARE PLAN
Principal Discharge DX:	Asthma exacerbation, mild  Secondary Diagnosis:	Hypokalemia   Principal Discharge DX:	COVID-19 virus infection  Secondary Diagnosis:	Hypokalemia  Secondary Diagnosis:	Asthma exacerbation  Secondary Diagnosis:	Hyponatremia

## 2021-03-29 NOTE — ED PROVIDER NOTE - INTERPRETATION
Patient called stating post op CABG he experienced abd pain with transitioning position using his sternal precautions. He said it has now moved into his groin. Denies any bulging or redness. Improved with the use analgesics.No surgical intervention was done with his bypass. Instructed patient to have it evaluated by his primary care Dr. Riggs. Patient stated understanding.    normal

## 2021-03-29 NOTE — ED PROVIDER NOTE - CLINICAL SUMMARY MEDICAL DECISION MAKING FREE TEXT BOX
69 y/o female with asthma and HTN presents with chest tightness, shortness of breath, and cough. Will obtain labs, EKG, and CXR. Given Albuterol inhaler and IV Solumedrol. Will reassess. 69 y/o female with asthma and HTN presents with chest tightness, shortness of breath, and cough. Will obtain labs, EKG, and CXR. Given Albuterol inhaler and IV Solumedrol. Will reassess.    ekg shows TWI in V4-V6, trop negative, K 2.8-given IV and po repletion  discussed above with patient, on reeval patient reports resolution of shortness of breath after albuterol mdi/solumedrol, offered patient admission for further evaluation but patient declines, will repeat ekg/trop @4am, will reassess. 69 y/o female with asthma and HTN presents with chest tightness, shortness of breath, and cough. Will obtain labs, EKG, and CXR. Given Albuterol inhaler and IV Solumedrol. Will reassess.    ekg shows TWI in V4-V6, trop negative, K 2.8-given IV and po repletion, Na 128  Covid swab positive  discussed above with patient, on reeval patient reports improvement of shortness of breath after IV steroid and albuterol treatments. patient agrees with plan for admission.

## 2021-03-29 NOTE — ED PROVIDER NOTE - OBJECTIVE STATEMENT
69 y/o female h/o HTN, HLD, asthma, presents with cough and shortness of breath. Reports she has had a cough for the last 2 weeks. States she was seen by her pulmonologist 3 days ago who gave her a Ventolin inhaler which she takes 2 puffs every 4 hours, however 1 hour after using the inhaler she feels short of breath again. States she last used the inhaler at 8pm tonight and by 9pm felt short of breath again, thus she came to the ED for evaluation. States cough is dry. Reports chills but no fever. States she feels some chest tightness. No LE edema. No recent travel. No sick contacts,

## 2021-03-30 DIAGNOSIS — Z71.89 OTHER SPECIFIED COUNSELING: ICD-10-CM

## 2021-03-30 DIAGNOSIS — E87.1 HYPO-OSMOLALITY AND HYPONATREMIA: ICD-10-CM

## 2021-03-30 DIAGNOSIS — E87.6 HYPOKALEMIA: ICD-10-CM

## 2021-03-30 DIAGNOSIS — J45.901 UNSPECIFIED ASTHMA WITH (ACUTE) EXACERBATION: ICD-10-CM

## 2021-03-30 DIAGNOSIS — U07.1 COVID-19: ICD-10-CM

## 2021-03-30 DIAGNOSIS — E78.5 HYPERLIPIDEMIA, UNSPECIFIED: ICD-10-CM

## 2021-03-30 DIAGNOSIS — Z29.9 ENCOUNTER FOR PROPHYLACTIC MEASURES, UNSPECIFIED: ICD-10-CM

## 2021-03-30 DIAGNOSIS — I10 ESSENTIAL (PRIMARY) HYPERTENSION: ICD-10-CM

## 2021-03-30 LAB
24R-OH-CALCIDIOL SERPL-MCNC: 28.3 NG/ML — LOW (ref 30–80)
A1C WITH ESTIMATED AVERAGE GLUCOSE RESULT: 6 % — HIGH (ref 4–5.6)
ALBUMIN SERPL ELPH-MCNC: 3.2 G/DL — LOW (ref 3.5–5)
ALBUMIN SERPL ELPH-MCNC: 3.4 G/DL — LOW (ref 3.5–5)
ALP SERPL-CCNC: 74 U/L — SIGNIFICANT CHANGE UP (ref 40–120)
ALP SERPL-CCNC: 78 U/L — SIGNIFICANT CHANGE UP (ref 40–120)
ALT FLD-CCNC: 41 U/L DA — SIGNIFICANT CHANGE UP (ref 10–60)
ALT FLD-CCNC: 42 U/L DA — SIGNIFICANT CHANGE UP (ref 10–60)
ANION GAP SERPL CALC-SCNC: 11 MMOL/L — SIGNIFICANT CHANGE UP (ref 5–17)
ANION GAP SERPL CALC-SCNC: 11 MMOL/L — SIGNIFICANT CHANGE UP (ref 5–17)
ANISOCYTOSIS BLD QL: SLIGHT — SIGNIFICANT CHANGE UP
APTT BLD: 29 SEC — SIGNIFICANT CHANGE UP (ref 27.5–35.5)
APTT BLD: 30.6 SEC — SIGNIFICANT CHANGE UP (ref 27.5–35.5)
AST SERPL-CCNC: 33 U/L — SIGNIFICANT CHANGE UP (ref 10–40)
AST SERPL-CCNC: 35 U/L — SIGNIFICANT CHANGE UP (ref 10–40)
BASOPHILS # BLD AUTO: 0 K/UL — SIGNIFICANT CHANGE UP (ref 0–0.2)
BASOPHILS # BLD AUTO: 0.01 K/UL — SIGNIFICANT CHANGE UP (ref 0–0.2)
BASOPHILS NFR BLD AUTO: 0 % — SIGNIFICANT CHANGE UP (ref 0–2)
BASOPHILS NFR BLD AUTO: 0.2 % — SIGNIFICANT CHANGE UP (ref 0–2)
BILIRUB SERPL-MCNC: 0.6 MG/DL — SIGNIFICANT CHANGE UP (ref 0.2–1.2)
BILIRUB SERPL-MCNC: 0.6 MG/DL — SIGNIFICANT CHANGE UP (ref 0.2–1.2)
BUN SERPL-MCNC: 8 MG/DL — SIGNIFICANT CHANGE UP (ref 7–18)
BUN SERPL-MCNC: 9 MG/DL — SIGNIFICANT CHANGE UP (ref 7–18)
CALCIUM SERPL-MCNC: 8 MG/DL — LOW (ref 8.4–10.5)
CALCIUM SERPL-MCNC: 8.4 MG/DL — SIGNIFICANT CHANGE UP (ref 8.4–10.5)
CHLORIDE SERPL-SCNC: 90 MMOL/L — LOW (ref 96–108)
CHLORIDE SERPL-SCNC: 93 MMOL/L — LOW (ref 96–108)
CHOLEST SERPL-MCNC: 139 MG/DL — SIGNIFICANT CHANGE UP
CO2 SERPL-SCNC: 27 MMOL/L — SIGNIFICANT CHANGE UP (ref 22–31)
CO2 SERPL-SCNC: 27 MMOL/L — SIGNIFICANT CHANGE UP (ref 22–31)
COVID-19 SPIKE DOMAIN AB INTERP: NEGATIVE — SIGNIFICANT CHANGE UP
COVID-19 SPIKE DOMAIN ANTIBODY RESULT: 0.4 U/ML — SIGNIFICANT CHANGE UP
CREAT SERPL-MCNC: 0.9 MG/DL — SIGNIFICANT CHANGE UP (ref 0.5–1.3)
CREAT SERPL-MCNC: 0.92 MG/DL — SIGNIFICANT CHANGE UP (ref 0.5–1.3)
CRP SERPL-MCNC: 6 MG/L — HIGH
DACRYOCYTES BLD QL SMEAR: SLIGHT — SIGNIFICANT CHANGE UP
EOSINOPHIL # BLD AUTO: 0 K/UL — SIGNIFICANT CHANGE UP (ref 0–0.5)
EOSINOPHIL # BLD AUTO: 0.02 K/UL — SIGNIFICANT CHANGE UP (ref 0–0.5)
EOSINOPHIL NFR BLD AUTO: 0 % — SIGNIFICANT CHANGE UP (ref 0–6)
EOSINOPHIL NFR BLD AUTO: 0.5 % — SIGNIFICANT CHANGE UP (ref 0–6)
ERYTHROCYTE [SEDIMENTATION RATE] IN BLOOD: 10 MM/HR — SIGNIFICANT CHANGE UP (ref 0–20)
ESTIMATED AVERAGE GLUCOSE: 126 MG/DL — HIGH (ref 68–114)
FERRITIN SERPL-MCNC: 102 NG/ML — SIGNIFICANT CHANGE UP (ref 15–150)
GLUCOSE BLDC GLUCOMTR-MCNC: 170 MG/DL — HIGH (ref 70–99)
GLUCOSE BLDC GLUCOMTR-MCNC: 223 MG/DL — HIGH (ref 70–99)
GLUCOSE SERPL-MCNC: 109 MG/DL — HIGH (ref 70–99)
GLUCOSE SERPL-MCNC: 177 MG/DL — HIGH (ref 70–99)
HCT VFR BLD CALC: 38.1 % — SIGNIFICANT CHANGE UP (ref 34.5–45)
HCT VFR BLD CALC: 38.3 % — SIGNIFICANT CHANGE UP (ref 34.5–45)
HDLC SERPL-MCNC: 64 MG/DL — SIGNIFICANT CHANGE UP
HGB BLD-MCNC: 13.4 G/DL — SIGNIFICANT CHANGE UP (ref 11.5–15.5)
HGB BLD-MCNC: 13.5 G/DL — SIGNIFICANT CHANGE UP (ref 11.5–15.5)
IMM GRANULOCYTES NFR BLD AUTO: 0.4 % — SIGNIFICANT CHANGE UP (ref 0–1.5)
IMM GRANULOCYTES NFR BLD AUTO: 0.5 % — SIGNIFICANT CHANGE UP (ref 0–1.5)
INR BLD: 1.11 RATIO — SIGNIFICANT CHANGE UP (ref 0.88–1.16)
INR BLD: 1.12 RATIO — SIGNIFICANT CHANGE UP (ref 0.88–1.16)
LDH SERPL L TO P-CCNC: 188 U/L — SIGNIFICANT CHANGE UP (ref 120–225)
LG PLATELETS BLD QL AUTO: SLIGHT — SIGNIFICANT CHANGE UP
LIPID PNL WITH DIRECT LDL SERPL: 67 MG/DL — SIGNIFICANT CHANGE UP
LYMPHOCYTES # BLD AUTO: 0.33 K/UL — LOW (ref 1–3.3)
LYMPHOCYTES # BLD AUTO: 0.72 K/UL — LOW (ref 1–3.3)
LYMPHOCYTES # BLD AUTO: 13.8 % — SIGNIFICANT CHANGE UP (ref 13–44)
LYMPHOCYTES # BLD AUTO: 16.6 % — SIGNIFICANT CHANGE UP (ref 13–44)
MACROCYTES BLD QL: SLIGHT — SIGNIFICANT CHANGE UP
MAGNESIUM SERPL-MCNC: 1.8 MG/DL — SIGNIFICANT CHANGE UP (ref 1.6–2.6)
MAGNESIUM SERPL-MCNC: 1.8 MG/DL — SIGNIFICANT CHANGE UP (ref 1.6–2.6)
MANUAL SMEAR VERIFICATION: SIGNIFICANT CHANGE UP
MCHC RBC-ENTMCNC: 31 PG — SIGNIFICANT CHANGE UP (ref 27–34)
MCHC RBC-ENTMCNC: 31.4 PG — SIGNIFICANT CHANGE UP (ref 27–34)
MCHC RBC-ENTMCNC: 35.2 GM/DL — SIGNIFICANT CHANGE UP (ref 32–36)
MCHC RBC-ENTMCNC: 35.2 GM/DL — SIGNIFICANT CHANGE UP (ref 32–36)
MCV RBC AUTO: 88 FL — SIGNIFICANT CHANGE UP (ref 80–100)
MCV RBC AUTO: 89.2 FL — SIGNIFICANT CHANGE UP (ref 80–100)
MONOCYTES # BLD AUTO: 0.06 K/UL — SIGNIFICANT CHANGE UP (ref 0–0.9)
MONOCYTES # BLD AUTO: 0.5 K/UL — SIGNIFICANT CHANGE UP (ref 0–0.9)
MONOCYTES NFR BLD AUTO: 11.5 % — SIGNIFICANT CHANGE UP (ref 2–14)
MONOCYTES NFR BLD AUTO: 2.5 % — SIGNIFICANT CHANGE UP (ref 2–14)
NEUTROPHILS # BLD AUTO: 2 K/UL — SIGNIFICANT CHANGE UP (ref 1.8–7.4)
NEUTROPHILS # BLD AUTO: 3.08 K/UL — SIGNIFICANT CHANGE UP (ref 1.8–7.4)
NEUTROPHILS NFR BLD AUTO: 70.7 % — SIGNIFICANT CHANGE UP (ref 43–77)
NEUTROPHILS NFR BLD AUTO: 83.3 % — HIGH (ref 43–77)
NON HDL CHOLESTEROL: 75 MG/DL — SIGNIFICANT CHANGE UP
NRBC # BLD: 0 /100 WBCS — SIGNIFICANT CHANGE UP (ref 0–0)
NRBC # BLD: 0 /100 WBCS — SIGNIFICANT CHANGE UP (ref 0–0)
NT-PROBNP SERPL-SCNC: 42 PG/ML — SIGNIFICANT CHANGE UP (ref 0–125)
PHOSPHATE SERPL-MCNC: 3 MG/DL — SIGNIFICANT CHANGE UP (ref 2.5–4.5)
PLAT MORPH BLD: NORMAL — SIGNIFICANT CHANGE UP
PLATELET # BLD AUTO: 149 K/UL — LOW (ref 150–400)
PLATELET # BLD AUTO: 151 K/UL — SIGNIFICANT CHANGE UP (ref 150–400)
PLATELET COUNT - ESTIMATE: ABNORMAL
POIKILOCYTOSIS BLD QL AUTO: SLIGHT — SIGNIFICANT CHANGE UP
POTASSIUM SERPL-MCNC: 2.9 MMOL/L — CRITICAL LOW (ref 3.5–5.3)
POTASSIUM SERPL-MCNC: 3.4 MMOL/L — LOW (ref 3.5–5.3)
POTASSIUM SERPL-SCNC: 2.9 MMOL/L — CRITICAL LOW (ref 3.5–5.3)
POTASSIUM SERPL-SCNC: 3.4 MMOL/L — LOW (ref 3.5–5.3)
PROCALCITONIN SERPL-MCNC: 0.07 NG/ML — SIGNIFICANT CHANGE UP (ref 0.02–0.1)
PROT SERPL-MCNC: 6.7 G/DL — SIGNIFICANT CHANGE UP (ref 6–8.3)
PROT SERPL-MCNC: 6.8 G/DL — SIGNIFICANT CHANGE UP (ref 6–8.3)
PROTHROM AB SERPL-ACNC: 13.1 SEC — SIGNIFICANT CHANGE UP (ref 10.6–13.6)
PROTHROM AB SERPL-ACNC: 13.3 SEC — SIGNIFICANT CHANGE UP (ref 10.6–13.6)
RAPID RVP RESULT: DETECTED
RBC # BLD: 4.27 M/UL — SIGNIFICANT CHANGE UP (ref 3.8–5.2)
RBC # BLD: 4.35 M/UL — SIGNIFICANT CHANGE UP (ref 3.8–5.2)
RBC # FLD: 11.7 % — SIGNIFICANT CHANGE UP (ref 10.3–14.5)
RBC # FLD: 12.1 % — SIGNIFICANT CHANGE UP (ref 10.3–14.5)
RBC BLD AUTO: ABNORMAL
SARS-COV-2 IGG+IGM SERPL QL IA: 0.4 U/ML — SIGNIFICANT CHANGE UP
SARS-COV-2 IGG+IGM SERPL QL IA: NEGATIVE — SIGNIFICANT CHANGE UP
SARS-COV-2 RNA SPEC QL NAA+PROBE: DETECTED
SODIUM SERPL-SCNC: 128 MMOL/L — LOW (ref 135–145)
SODIUM SERPL-SCNC: 131 MMOL/L — LOW (ref 135–145)
TRIGL SERPL-MCNC: 38 MG/DL — SIGNIFICANT CHANGE UP
TROPONIN I SERPL-MCNC: <0.015 NG/ML — SIGNIFICANT CHANGE UP (ref 0–0.04)
TSH SERPL-MCNC: 0.23 UU/ML — LOW (ref 0.34–4.82)
VIT B12 SERPL-MCNC: 610 PG/ML — SIGNIFICANT CHANGE UP (ref 232–1245)
WBC # BLD: 2.4 K/UL — LOW (ref 3.8–10.5)
WBC # BLD: 4.35 K/UL — SIGNIFICANT CHANGE UP (ref 3.8–10.5)
WBC # FLD AUTO: 2.4 K/UL — LOW (ref 3.8–10.5)
WBC # FLD AUTO: 4.35 K/UL — SIGNIFICANT CHANGE UP (ref 3.8–10.5)

## 2021-03-30 PROCEDURE — 71046 X-RAY EXAM CHEST 2 VIEWS: CPT | Mod: 26

## 2021-03-30 RX ORDER — INSULIN LISPRO 100/ML
2 VIAL (ML) SUBCUTANEOUS ONCE
Refills: 0 | Status: COMPLETED | OUTPATIENT
Start: 2021-03-30 | End: 2021-03-30

## 2021-03-30 RX ORDER — ONDANSETRON 8 MG/1
4 TABLET, FILM COATED ORAL EVERY 6 HOURS
Refills: 0 | Status: DISCONTINUED | OUTPATIENT
Start: 2021-03-30 | End: 2021-03-31

## 2021-03-30 RX ORDER — SODIUM CHLORIDE 9 MG/ML
1000 INJECTION INTRAMUSCULAR; INTRAVENOUS; SUBCUTANEOUS
Refills: 0 | Status: DISCONTINUED | OUTPATIENT
Start: 2021-03-30 | End: 2021-03-31

## 2021-03-30 RX ORDER — ZINC SULFATE TAB 220 MG (50 MG ZINC EQUIVALENT) 220 (50 ZN) MG
220 TAB ORAL DAILY
Refills: 0 | Status: DISCONTINUED | OUTPATIENT
Start: 2021-03-30 | End: 2021-03-31

## 2021-03-30 RX ORDER — AZITHROMYCIN 500 MG/1
500 TABLET, FILM COATED ORAL ONCE
Refills: 0 | Status: COMPLETED | OUTPATIENT
Start: 2021-03-30 | End: 2021-03-30

## 2021-03-30 RX ORDER — ATORVASTATIN CALCIUM 80 MG/1
20 TABLET, FILM COATED ORAL AT BEDTIME
Refills: 0 | Status: DISCONTINUED | OUTPATIENT
Start: 2021-03-30 | End: 2021-03-31

## 2021-03-30 RX ORDER — ALBUTEROL 90 UG/1
4 AEROSOL, METERED ORAL EVERY 4 HOURS
Refills: 0 | Status: DISCONTINUED | OUTPATIENT
Start: 2021-03-30 | End: 2021-03-30

## 2021-03-30 RX ORDER — POTASSIUM CHLORIDE 20 MEQ
40 PACKET (EA) ORAL ONCE
Refills: 0 | Status: COMPLETED | OUTPATIENT
Start: 2021-03-30 | End: 2021-03-30

## 2021-03-30 RX ORDER — AMLODIPINE BESYLATE 2.5 MG/1
1 TABLET ORAL
Qty: 0 | Refills: 0 | DISCHARGE

## 2021-03-30 RX ORDER — INSULIN LISPRO 100/ML
VIAL (ML) SUBCUTANEOUS
Refills: 0 | Status: DISCONTINUED | OUTPATIENT
Start: 2021-03-30 | End: 2021-03-31

## 2021-03-30 RX ORDER — AZITHROMYCIN 500 MG/1
TABLET, FILM COATED ORAL
Refills: 0 | Status: DISCONTINUED | OUTPATIENT
Start: 2021-03-30 | End: 2021-03-30

## 2021-03-30 RX ORDER — ASPIRIN/CALCIUM CARB/MAGNESIUM 324 MG
81 TABLET ORAL DAILY
Refills: 0 | Status: DISCONTINUED | OUTPATIENT
Start: 2021-03-30 | End: 2021-03-31

## 2021-03-30 RX ORDER — AZITHROMYCIN 500 MG/1
250 TABLET, FILM COATED ORAL DAILY
Refills: 0 | Status: DISCONTINUED | OUTPATIENT
Start: 2021-03-31 | End: 2021-03-31

## 2021-03-30 RX ORDER — VALSARTAN 80 MG/1
320 TABLET ORAL DAILY
Refills: 0 | Status: DISCONTINUED | OUTPATIENT
Start: 2021-03-30 | End: 2021-03-31

## 2021-03-30 RX ORDER — INSULIN LISPRO 100/ML
VIAL (ML) SUBCUTANEOUS AT BEDTIME
Refills: 0 | Status: DISCONTINUED | OUTPATIENT
Start: 2021-03-30 | End: 2021-03-31

## 2021-03-30 RX ORDER — ATORVASTATIN CALCIUM 80 MG/1
1 TABLET, FILM COATED ORAL
Qty: 0 | Refills: 0 | DISCHARGE

## 2021-03-30 RX ORDER — POTASSIUM CHLORIDE 20 MEQ
40 PACKET (EA) ORAL EVERY 4 HOURS
Refills: 0 | Status: COMPLETED | OUTPATIENT
Start: 2021-03-30 | End: 2021-03-30

## 2021-03-30 RX ORDER — CHOLECALCIFEROL (VITAMIN D3) 125 MCG
2000 CAPSULE ORAL DAILY
Refills: 0 | Status: DISCONTINUED | OUTPATIENT
Start: 2021-03-30 | End: 2021-03-31

## 2021-03-30 RX ORDER — ENOXAPARIN SODIUM 100 MG/ML
40 INJECTION SUBCUTANEOUS DAILY
Refills: 0 | Status: DISCONTINUED | OUTPATIENT
Start: 2021-03-30 | End: 2021-03-31

## 2021-03-30 RX ORDER — ASCORBIC ACID 60 MG
1000 TABLET,CHEWABLE ORAL DAILY
Refills: 0 | Status: DISCONTINUED | OUTPATIENT
Start: 2021-03-30 | End: 2021-03-31

## 2021-03-30 RX ORDER — METOPROLOL TARTRATE 50 MG
25 TABLET ORAL DAILY
Refills: 0 | Status: DISCONTINUED | OUTPATIENT
Start: 2021-03-30 | End: 2021-03-31

## 2021-03-30 RX ORDER — SODIUM CHLORIDE 9 MG/ML
1000 INJECTION INTRAMUSCULAR; INTRAVENOUS; SUBCUTANEOUS ONCE
Refills: 0 | Status: COMPLETED | OUTPATIENT
Start: 2021-03-30 | End: 2021-03-30

## 2021-03-30 RX ORDER — ACETAMINOPHEN 500 MG
650 TABLET ORAL EVERY 6 HOURS
Refills: 0 | Status: DISCONTINUED | OUTPATIENT
Start: 2021-03-30 | End: 2021-03-31

## 2021-03-30 RX ORDER — POTASSIUM CHLORIDE 20 MEQ
10 PACKET (EA) ORAL
Refills: 0 | Status: COMPLETED | OUTPATIENT
Start: 2021-03-30 | End: 2021-03-30

## 2021-03-30 RX ORDER — ALBUTEROL 90 UG/1
2 AEROSOL, METERED ORAL EVERY 6 HOURS
Refills: 0 | Status: DISCONTINUED | OUTPATIENT
Start: 2021-03-30 | End: 2021-03-31

## 2021-03-30 RX ADMIN — ZINC SULFATE TAB 220 MG (50 MG ZINC EQUIVALENT) 220 MILLIGRAM(S): 220 (50 ZN) TAB at 11:51

## 2021-03-30 RX ADMIN — Medication 100 MILLIEQUIVALENT(S): at 03:21

## 2021-03-30 RX ADMIN — Medication 1: at 18:00

## 2021-03-30 RX ADMIN — Medication 2 UNIT(S): at 09:36

## 2021-03-30 RX ADMIN — Medication 100 MILLIEQUIVALENT(S): at 02:23

## 2021-03-30 RX ADMIN — Medication 125 MILLIGRAM(S): at 00:42

## 2021-03-30 RX ADMIN — SODIUM CHLORIDE 65 MILLILITER(S): 9 INJECTION INTRAMUSCULAR; INTRAVENOUS; SUBCUTANEOUS at 18:02

## 2021-03-30 RX ADMIN — AZITHROMYCIN 255 MILLIGRAM(S): 500 TABLET, FILM COATED ORAL at 10:57

## 2021-03-30 RX ADMIN — ENOXAPARIN SODIUM 40 MILLIGRAM(S): 100 INJECTION SUBCUTANEOUS at 11:51

## 2021-03-30 RX ADMIN — Medication 100 MILLIEQUIVALENT(S): at 04:42

## 2021-03-30 RX ADMIN — Medication 25 MILLIGRAM(S): at 07:08

## 2021-03-30 RX ADMIN — ALBUTEROL 4 PUFF(S): 90 AEROSOL, METERED ORAL at 03:42

## 2021-03-30 RX ADMIN — Medication 1000 MILLIGRAM(S): at 11:51

## 2021-03-30 RX ADMIN — Medication 40 MILLIEQUIVALENT(S): at 13:38

## 2021-03-30 RX ADMIN — Medication 40 MILLIGRAM(S): at 09:00

## 2021-03-30 RX ADMIN — Medication 81 MILLIGRAM(S): at 11:51

## 2021-03-30 RX ADMIN — SODIUM CHLORIDE 1000 MILLILITER(S): 9 INJECTION INTRAMUSCULAR; INTRAVENOUS; SUBCUTANEOUS at 02:24

## 2021-03-30 RX ADMIN — Medication 2000 UNIT(S): at 11:50

## 2021-03-30 RX ADMIN — Medication 40 MILLIGRAM(S): at 18:01

## 2021-03-30 RX ADMIN — ALBUTEROL 4 PUFF(S): 90 AEROSOL, METERED ORAL at 00:41

## 2021-03-30 RX ADMIN — Medication 40 MILLIEQUIVALENT(S): at 03:41

## 2021-03-30 RX ADMIN — ATORVASTATIN CALCIUM 20 MILLIGRAM(S): 80 TABLET, FILM COATED ORAL at 22:26

## 2021-03-30 RX ADMIN — VALSARTAN 320 MILLIGRAM(S): 80 TABLET ORAL at 07:08

## 2021-03-30 RX ADMIN — Medication 40 MILLIEQUIVALENT(S): at 09:38

## 2021-03-30 NOTE — PROGRESS NOTE ADULT - PROBLEM SELECTOR PLAN 3
Noted to have potassium of 2.9 on admission   likely due to frequent use of inhaler in past week  given 40mg potassium PO x1 and 10mg IV x3 in ED  monitor BMP daily and replace electrolytes as needed Noted to have potassium of 2.9 on admission most likely due to frequent use of inhaler in past week  S/p 40 meq potassium PO x1 and 10mg IV x3 in ED  Will order 40 meq potassium PO x 2   Monitor BMP daily and replace electrolytes as needed

## 2021-03-30 NOTE — H&P ADULT - ASSESSMENT
Patient is a 70 year old female from home AAO x3, with PMH of HTN, HLD and asthma, who presented to the ED due to SOB and cough.     Patient saturating well on room air. Noted to be COVID positive. Sodium of 128. Potassium of 2.9. Troponin negative x1. EKG showing TWI in V4-V6 unchanged from prior. Given 1L NS bolus, potassium 40mg PO x1 and 10mg IV x3. Given albuterol and solumedrol 125mg in ED.

## 2021-03-30 NOTE — PROGRESS NOTE ADULT - SUBJECTIVE AND OBJECTIVE BOX
PGY-1 Progress Note discussed with attending    PAGER #: [[phone]] TILL 5:00 PM  PLEASE CONTACT ON CALL TEAM:  - On Call Team (Please refer to Joshua) FROM 5:00 PM - 8:30PM  - Nightfloat Team FROM 8:30 -7:30 AM    CHIEF COMPLAINT & BRIEF HOSPITAL COURSE:      INTERVAL HPI/OVERNIGHT EVENTS:       MEDICATIONS  (STANDING):  ascorbic acid 1000 milliGRAM(s) Oral daily  aspirin enteric coated 81 milliGRAM(s) Oral daily  atorvastatin 20 milliGRAM(s) Oral at bedtime  cholecalciferol 2000 Unit(s) Oral daily  enoxaparin Injectable 40 milliGRAM(s) SubCutaneous daily  insulin lispro (ADMELOG) corrective regimen sliding scale   SubCutaneous three times a day before meals  insulin lispro (ADMELOG) corrective regimen sliding scale   SubCutaneous at bedtime  methylPREDNISolone sodium succinate Injectable 40 milliGRAM(s) IV Push every 8 hours  metoprolol succinate ER 25 milliGRAM(s) Oral daily  potassium chloride    Tablet ER 40 milliEquivalent(s) Oral every 4 hours  valsartan 320 milliGRAM(s) Oral daily  zinc sulfate 220 milliGRAM(s) Oral daily    MEDICATIONS  (PRN):  acetaminophen   Tablet .. 650 milliGRAM(s) Oral every 6 hours PRN Temp greater or equal to 38C (100.4F), Moderate Pain (4 - 6)  ALBUTerol    90 MICROgram(s) HFA Inhaler 2 Puff(s) Inhalation every 6 hours PRN Shortness of Breath and/or Wheezing  ondansetron Injectable 4 milliGRAM(s) IV Push every 6 hours PRN Nausea and/or Vomiting      Vital Signs Last 24 Hrs  T(C): 36.8 (30 Mar 2021 06:02), Max: 36.9 (29 Mar 2021 21:54)  T(F): 98.2 (30 Mar 2021 06:02), Max: 98.5 (29 Mar 2021 21:54)  HR: 72 (30 Mar 2021 06:02) (72 - 82)  BP: 161/81 (30 Mar 2021 06:02) (157/67 - 161/81)  BP(mean): --  RR: 17 (30 Mar 2021 06:02) (17 - 18)  SpO2: 95% (30 Mar 2021 06:02) (95% - 96%)    PHYSICAL EXAMINATION:  GENERAL: NAD, well built  HEAD:  Atraumatic, Normocephalic  EYES:  conjunctiva and sclera clear  NECK: Supple, No JVD, Normal thyroid  CHEST/LUNG: Clear to auscultation. Clear to percussion bilaterally; No rales, rhonchi, wheezing, or rubs  HEART: Regular rate and rhythm; No murmurs, rubs, or gallops  ABDOMEN: Soft, Nontender, Nondistended; Bowel sounds present, no pain or masses on palpation  NERVOUS SYSTEM:  Alert & Oriented X3  : voiding well  EXTREMITIES:  2+ Peripheral Pulses, No clubbing, cyanosis, or edema  SKIN: warm dry                          13.5   2.40  )-----------( 149      ( 30 Mar 2021 07:24 )             38.3     03-30    131<L>  |  93<L>  |  8   ----------------------------<  177<H>  3.4<L>   |  27  |  0.92    Ca    8.0<L>      30 Mar 2021 07:24  Phos  3.0     03-30  Mg     1.8     03-30    TPro  6.7  /  Alb  3.2<L>  /  TBili  0.6  /  DBili  x   /  AST  35  /  ALT  42  /  AlkPhos  74  03-30    LIVER FUNCTIONS - ( 30 Mar 2021 07:24 )  Alb: 3.2 g/dL / Pro: 6.7 g/dL / ALK PHOS: 74 U/L / ALT: 42 U/L DA / AST: 35 U/L / GGT: x           CARDIAC MARKERS ( 30 Mar 2021 00:52 )  <0.015 ng/mL / x     / x     / x     / x          PT/INR - ( 30 Mar 2021 07:24 )   PT: 13.1 sec;   INR: 1.11 ratio         PTT - ( 30 Mar 2021 07:24 )  PTT:30.6 sec    I&O's Summary                   PGY-1 Progress Note discussed with attending    PAGER #: [[phone]] TILL 5:00 PM  PLEASE CONTACT ON CALL TEAM:  - On Call Team (Please refer to Joshua) FROM 5:00 PM - 8:30PM  - Nightfloat Team FROM 8:30 -7:30 AM    CHIEF COMPLAINT & BRIEF HOSPITAL COURSE:  70 year old female from home has past medical hx of hypertension and asthma came to ED c/o worsening sob despite use of Ventolin at home. Patient admitted for Asthma exacerbation. In ED patient received Solumedrol and albuterol.   Patient continued on albuterol, solumedrol IV, not requiring oxygen supplementation.  For hypertension, continued on home meds, holding Chlorthalidone as patient is hypokalemic. For hyponatremia, on IVF, improving     INTERVAL HPI/OVERNIGHT EVENTS: patient refers feeling better, denies sob or chest pain.     MEDICATIONS  (STANDING):  ascorbic acid 1000 milliGRAM(s) Oral daily  aspirin enteric coated 81 milliGRAM(s) Oral daily  atorvastatin 20 milliGRAM(s) Oral at bedtime  cholecalciferol 2000 Unit(s) Oral daily  enoxaparin Injectable 40 milliGRAM(s) SubCutaneous daily  insulin lispro (ADMELOG) corrective regimen sliding scale   SubCutaneous three times a day before meals  insulin lispro (ADMELOG) corrective regimen sliding scale   SubCutaneous at bedtime  methylPREDNISolone sodium succinate Injectable 40 milliGRAM(s) IV Push every 8 hours  metoprolol succinate ER 25 milliGRAM(s) Oral daily  potassium chloride    Tablet ER 40 milliEquivalent(s) Oral every 4 hours  valsartan 320 milliGRAM(s) Oral daily  zinc sulfate 220 milliGRAM(s) Oral daily    MEDICATIONS  (PRN):  acetaminophen   Tablet .. 650 milliGRAM(s) Oral every 6 hours PRN Temp greater or equal to 38C (100.4F), Moderate Pain (4 - 6)  ALBUTerol    90 MICROgram(s) HFA Inhaler 2 Puff(s) Inhalation every 6 hours PRN Shortness of Breath and/or Wheezing  ondansetron Injectable 4 milliGRAM(s) IV Push every 6 hours PRN Nausea and/or Vomiting      Vital Signs Last 24 Hrs  T(C): 36.8 (30 Mar 2021 06:02), Max: 36.9 (29 Mar 2021 21:54)  T(F): 98.2 (30 Mar 2021 06:02), Max: 98.5 (29 Mar 2021 21:54)  HR: 72 (30 Mar 2021 06:02) (72 - 82)  BP: 161/81 (30 Mar 2021 06:02) (157/67 - 161/81)  BP(mean): --  RR: 17 (30 Mar 2021 06:02) (17 - 18)  SpO2: 95% (30 Mar 2021 06:02) (95% - 96%)    PHYSICAL EXAMINATION:  GENERAL: NAD, overweight, sat well on RA  HEAD:  Atraumatic, Normocephalic  EYES:  conjunctiva and sclera clear  NECK: Supple, No JVD, Normal thyroid  CHEST/LUNG: Clear to auscultation. Clear to percussion bilaterally; No rales, rhonchi, wheezing, or rubs  HEART: Regular rate and rhythm; No murmurs, rubs, or gallops  ABDOMEN: Soft, Nontender, Nondistended; Bowel sounds present, no pain or masses on palpation  NERVOUS SYSTEM:  Alert & Oriented X3  : voiding well  EXTREMITIES:  2+ Peripheral Pulses, No clubbing, cyanosis, or edema  SKIN: warm dry                          13.5   2.40  )-----------( 149      ( 30 Mar 2021 07:24 )             38.3     03-30    131<L>  |  93<L>  |  8   ----------------------------<  177<H>  3.4<L>   |  27  |  0.92    Ca    8.0<L>      30 Mar 2021 07:24  Phos  3.0     03-30  Mg     1.8     03-30    TPro  6.7  /  Alb  3.2<L>  /  TBili  0.6  /  DBili  x   /  AST  35  /  ALT  42  /  AlkPhos  74  03-30    LIVER FUNCTIONS - ( 30 Mar 2021 07:24 )  Alb: 3.2 g/dL / Pro: 6.7 g/dL / ALK PHOS: 74 U/L / ALT: 42 U/L DA / AST: 35 U/L / GGT: x           CARDIAC MARKERS ( 30 Mar 2021 00:52 )  <0.015 ng/mL / x     / x     / x     / x          PT/INR - ( 30 Mar 2021 07:24 )   PT: 13.1 sec;   INR: 1.11 ratio         PTT - ( 30 Mar 2021 07:24 )  PTT:30.6 sec    I&O's Summary                   PGY-1 Progress Note discussed with attending    PAGER #: [[phone]] TILL 5:00 PM  PLEASE CONTACT ON CALL TEAM:  - On Call Team (Please refer to Joshua) FROM 5:00 PM - 8:30PM  - Nightfloat Team FROM 8:30 -7:30 AM    CHIEF COMPLAINT & BRIEF HOSPITAL COURSE:  70 year old female from home has past medical hx of hypertension and asthma came to ED c/o worsening sob despite use of Ventolin at home. Patient admitted for Asthma exacerbation. Patient was saturating well on room air and did not require oxygen supplementation. Patient was treated with SOLUMEDROL and  ALBUTEROL. Patient will be discharged with prophylactic AZITHROMYCIN 250 mg for 5 days and should follow up with her PCP in a week from discharge.     Patient also tested positive for COVID19, antibodies negative upon admission. CXR revealed bilateral patchy infiltrates. Patient was saturating well on room air and did not require oxygen supplementation. Patient was treated with supportive measures with Vitamin C, D and zinc.     Patient was noted to have potassium of 2.9 on admission due to frequent inhaler use. The  potassium was corrected with oral and IV potassium and was monitored with a daily BMP.     Patient was noted to have a sodium of 128 on admission. Patient's sodium was corrected with 1L NS and monitored with a daily CMP.     Patient has a history of hypertension and was continued on home medications VALSARTAN, METOPROLOL. The home medication CHLORTHALIDONE was withheld due to hypokalemia.     Patient has a history of hyperlipidemia and was continued on home medication ATORVASTATIN, lipid panel was unremarkable.       INTERVAL HPI/OVERNIGHT EVENTS: patient refers feeling better, denies sob or chest pain.     MEDICATIONS  (STANDING):  ascorbic acid 1000 milliGRAM(s) Oral daily  aspirin enteric coated 81 milliGRAM(s) Oral daily  atorvastatin 20 milliGRAM(s) Oral at bedtime  cholecalciferol 2000 Unit(s) Oral daily  enoxaparin Injectable 40 milliGRAM(s) SubCutaneous daily  insulin lispro (ADMELOG) corrective regimen sliding scale   SubCutaneous three times a day before meals  insulin lispro (ADMELOG) corrective regimen sliding scale   SubCutaneous at bedtime  methylPREDNISolone sodium succinate Injectable 40 milliGRAM(s) IV Push every 8 hours  metoprolol succinate ER 25 milliGRAM(s) Oral daily  potassium chloride    Tablet ER 40 milliEquivalent(s) Oral every 4 hours  valsartan 320 milliGRAM(s) Oral daily  zinc sulfate 220 milliGRAM(s) Oral daily    MEDICATIONS  (PRN):  acetaminophen   Tablet .. 650 milliGRAM(s) Oral every 6 hours PRN Temp greater or equal to 38C (100.4F), Moderate Pain (4 - 6)  ALBUTerol    90 MICROgram(s) HFA Inhaler 2 Puff(s) Inhalation every 6 hours PRN Shortness of Breath and/or Wheezing  ondansetron Injectable 4 milliGRAM(s) IV Push every 6 hours PRN Nausea and/or Vomiting      Vital Signs Last 24 Hrs  T(C): 36.8 (30 Mar 2021 06:02), Max: 36.9 (29 Mar 2021 21:54)  T(F): 98.2 (30 Mar 2021 06:02), Max: 98.5 (29 Mar 2021 21:54)  HR: 72 (30 Mar 2021 06:02) (72 - 82)  BP: 161/81 (30 Mar 2021 06:02) (157/67 - 161/81)  BP(mean): --  RR: 17 (30 Mar 2021 06:02) (17 - 18)  SpO2: 95% (30 Mar 2021 06:02) (95% - 96%)    PHYSICAL EXAMINATION:  GENERAL: NAD, overweight, sat well on RA  HEAD:  Atraumatic, Normocephalic  EYES:  conjunctiva and sclera clear  NECK: Supple, No JVD, Normal thyroid  CHEST/LUNG: Clear to auscultation. Clear to percussion bilaterally; No rales, rhonchi, wheezing, or rubs  HEART: Regular rate and rhythm; No murmurs, rubs, or gallops  ABDOMEN: Soft, Nontender, Nondistended; Bowel sounds present, no pain or masses on palpation  NERVOUS SYSTEM:  Alert & Oriented X3  : voiding well  EXTREMITIES:  2+ Peripheral Pulses, No clubbing, cyanosis, or edema  SKIN: warm dry                          13.5   2.40  )-----------( 149      ( 30 Mar 2021 07:24 )             38.3     03-30    131<L>  |  93<L>  |  8   ----------------------------<  177<H>  3.4<L>   |  27  |  0.92    Ca    8.0<L>      30 Mar 2021 07:24  Phos  3.0     03-30  Mg     1.8     03-30    TPro  6.7  /  Alb  3.2<L>  /  TBili  0.6  /  DBili  x   /  AST  35  /  ALT  42  /  AlkPhos  74  03-30    LIVER FUNCTIONS - ( 30 Mar 2021 07:24 )  Alb: 3.2 g/dL / Pro: 6.7 g/dL / ALK PHOS: 74 U/L / ALT: 42 U/L DA / AST: 35 U/L / GGT: x           CARDIAC MARKERS ( 30 Mar 2021 00:52 )  <0.015 ng/mL / x     / x     / x     / x          PT/INR - ( 30 Mar 2021 07:24 )   PT: 13.1 sec;   INR: 1.11 ratio         PTT - ( 30 Mar 2021 07:24 )  PTT:30.6 sec    I&O's Summary                   PGY-1 Progress Note discussed with attending    PAGER #: [[phone]] TILL 5:00 PM  PLEASE CONTACT ON CALL TEAM:  - On Call Team (Please refer to Joshau) FROM 5:00 PM - 8:30PM  - Nightfloat Team FROM 8:30 -7:30 AM    CHIEF COMPLAINT & BRIEF HOSPITAL COURSE:  70 year old female from home has past medical hx of hypertension and asthma came to ED c/o worsening sob despite use of Ventolin at home. Patient admitted for Asthma exacerbation. Patient was saturating well on room air and did not require oxygen supplementation. Patient was treated with SOLUMEDROL IV, ALBUTEROL, AZITHROMYCIN.   Patient also tested positive for COVID19, antibodies negative, CXR showed bilateral patchy infiltrates, saturating well on room air, not requiring oxygen supplementation. Patient is being treated with supportive measures with Vitamin C, D and zinc.   Patient was noted to have potassium of 2.9 on admission most likely due to frequent inhaler use, replaced with oral and IV potassium, monitored with a daily BMP.   Patient was noted to have a sodium of 128 on admission, treated with IVF and monitored with a daily CMP.   Patient has a history of hypertension, continued on home medications VALSARTAN, METOPROLOL, held CHLORTHALIDONE due to hypokalemia.   Patient has a history of hyperlipidemia and was continued on home medication ATORVASTATIN, lipid panel was unremarkable.       INTERVAL HPI/OVERNIGHT EVENTS: patient refers feeling better, denies sob or chest pain.     MEDICATIONS  (STANDING):  ascorbic acid 1000 milliGRAM(s) Oral daily  aspirin enteric coated 81 milliGRAM(s) Oral daily  atorvastatin 20 milliGRAM(s) Oral at bedtime  cholecalciferol 2000 Unit(s) Oral daily  enoxaparin Injectable 40 milliGRAM(s) SubCutaneous daily  insulin lispro (ADMELOG) corrective regimen sliding scale   SubCutaneous three times a day before meals  insulin lispro (ADMELOG) corrective regimen sliding scale   SubCutaneous at bedtime  methylPREDNISolone sodium succinate Injectable 40 milliGRAM(s) IV Push every 8 hours  metoprolol succinate ER 25 milliGRAM(s) Oral daily  potassium chloride    Tablet ER 40 milliEquivalent(s) Oral every 4 hours  valsartan 320 milliGRAM(s) Oral daily  zinc sulfate 220 milliGRAM(s) Oral daily    MEDICATIONS  (PRN):  acetaminophen   Tablet .. 650 milliGRAM(s) Oral every 6 hours PRN Temp greater or equal to 38C (100.4F), Moderate Pain (4 - 6)  ALBUTerol    90 MICROgram(s) HFA Inhaler 2 Puff(s) Inhalation every 6 hours PRN Shortness of Breath and/or Wheezing  ondansetron Injectable 4 milliGRAM(s) IV Push every 6 hours PRN Nausea and/or Vomiting      Vital Signs Last 24 Hrs  T(C): 36.8 (30 Mar 2021 06:02), Max: 36.9 (29 Mar 2021 21:54)  T(F): 98.2 (30 Mar 2021 06:02), Max: 98.5 (29 Mar 2021 21:54)  HR: 72 (30 Mar 2021 06:02) (72 - 82)  BP: 161/81 (30 Mar 2021 06:02) (157/67 - 161/81)  BP(mean): --  RR: 17 (30 Mar 2021 06:02) (17 - 18)  SpO2: 95% (30 Mar 2021 06:02) (95% - 96%)    PHYSICAL EXAMINATION:  GENERAL: NAD, overweight, sat well on RA  HEAD:  Atraumatic, Normocephalic  EYES:  conjunctiva and sclera clear  NECK: Supple, No JVD, Normal thyroid  CHEST/LUNG: Clear to auscultation. Clear to percussion bilaterally; No rales, rhonchi, wheezing, or rubs  HEART: Regular rate and rhythm; No murmurs, rubs, or gallops  ABDOMEN: Soft, Nontender, Nondistended; Bowel sounds present, no pain or masses on palpation  NERVOUS SYSTEM:  Alert & Oriented X3  : voiding well  EXTREMITIES:  2+ Peripheral Pulses, No clubbing, cyanosis, or edema  SKIN: warm dry                          13.5   2.40  )-----------( 149      ( 30 Mar 2021 07:24 )             38.3     03-30    131<L>  |  93<L>  |  8   ----------------------------<  177<H>  3.4<L>   |  27  |  0.92    Ca    8.0<L>      30 Mar 2021 07:24  Phos  3.0     03-30  Mg     1.8     03-30    TPro  6.7  /  Alb  3.2<L>  /  TBili  0.6  /  DBili  x   /  AST  35  /  ALT  42  /  AlkPhos  74  03-30    LIVER FUNCTIONS - ( 30 Mar 2021 07:24 )  Alb: 3.2 g/dL / Pro: 6.7 g/dL / ALK PHOS: 74 U/L / ALT: 42 U/L DA / AST: 35 U/L / GGT: x           CARDIAC MARKERS ( 30 Mar 2021 00:52 )  <0.015 ng/mL / x     / x     / x     / x          PT/INR - ( 30 Mar 2021 07:24 )   PT: 13.1 sec;   INR: 1.11 ratio         PTT - ( 30 Mar 2021 07:24 )  PTT:30.6 sec

## 2021-03-30 NOTE — H&P ADULT - PROBLEM SELECTOR PLAN 2
patient noted to be COVID positive  not on oxygen and saturating well on room air   monitor O2 sats  isolation precautions  giving solumedrol 40mg q8   vit c, vit d, zinc supplementation   f/u COVID ab and markers

## 2021-03-30 NOTE — CONSULT NOTE ADULT - SUBJECTIVE AND OBJECTIVE BOX
PULMONARY CONSULT NOTE      KIERAN ALONZO  MRN-[id number]    Patient is a 70y old  Female who presents with a chief complaint of SOB (30 Mar 2021 08:05)    History of Present Illness:  Reason for Admission: SOB  History of Present Illness:   Patient is a 70 year old female from home AAO x3, with PMH of HTN, HLD and asthma, who presented to the ED due to SOB and cough. Patient states that for the past week, she has been having more shortness of breath and dry cough. Patient has been taking her Ventolin inhaler almost every 4 hours over the week. Patient states that the inhaler helps her for about an hour and then once it wears off she starts feeling SOB again. Patient states she went to see her pulmonologist in [place] a few days ago and prescribed her an inhaler with steroids but stated that the pharmacy did not have it so she has been using her Ventolin inhaler in the meantime. Patient denies any wheezing. Denies any history of intubation or hospitalizations for her asthma. Currently patient denies any chest pain, nausea, vomiting, abdominal pain, diarrhea or constipation.     HISTORY OF PRESENT ILLNESS: As above. Awake, alert, comfortable in bed in NAD. Doing well on RA    MEDICATIONS  (STANDING):  ascorbic acid 1000 milliGRAM(s) Oral daily  aspirin enteric coated 81 milliGRAM(s) Oral daily  atorvastatin 20 milliGRAM(s) Oral at bedtime  cholecalciferol 2000 Unit(s) Oral daily  enoxaparin Injectable 40 milliGRAM(s) SubCutaneous daily  insulin lispro (ADMELOG) corrective regimen sliding scale   SubCutaneous three times a day before meals  insulin lispro (ADMELOG) corrective regimen sliding scale   SubCutaneous at bedtime  methylPREDNISolone sodium succinate Injectable 40 milliGRAM(s) IV Push every 8 hours  metoprolol succinate ER 25 milliGRAM(s) Oral daily  potassium chloride    Tablet ER 40 milliEquivalent(s) Oral every 4 hours  valsartan 320 milliGRAM(s) Oral daily  zinc sulfate 220 milliGRAM(s) Oral daily      MEDICATIONS  (PRN):  acetaminophen   Tablet .. 650 milliGRAM(s) Oral every 6 hours PRN Temp greater or equal to 38C (100.4F), Moderate Pain (4 - 6)  ALBUTerol    90 MICROgram(s) HFA Inhaler 2 Puff(s) Inhalation every 6 hours PRN Shortness of Breath and/or Wheezing  ondansetron Injectable 4 milliGRAM(s) IV Push every 6 hours PRN Nausea and/or Vomiting      Allergies    No Known Allergies    Intolerances        PAST MEDICAL & SURGICAL HISTORY:  HTN (hypertension)    HLD (hyperlipidemia)    Asthma    S/P total hysterectomy    H/O shoulder surgery    H/O knee surgery        FAMILY HISTORY:      SOCIAL HISTORY  Smoking History:     REVIEW OF SYSTEMS:    CONSTITUTIONAL:  No fevers, chills, sweats    HEENT:  Eyes:  No diplopia or blurred vision. ENT:  No earache, sore throat or runny nose.    CARDIOVASCULAR:  No pressure, squeezing, tightness, or heaviness about the chest; no palpitations.    RESPIRATORY:  Per HPI    GASTROINTESTINAL:  No abdominal pain, nausea, vomiting or diarrhea.    GENITOURINARY:  No dysuria, frequency or urgency.    NEUROLOGIC:  No paresthesias, fasciculations, seizures or weakness.    PSYCHIATRIC:  No disorder of thought or mood.    Vital Signs Last 24 Hrs  T(C): 36.8 (30 Mar 2021 06:02), Max: 36.9 (29 Mar 2021 21:54)  T(F): 98.2 (30 Mar 2021 06:02), Max: 98.5 (29 Mar 2021 21:54)  HR: 72 (30 Mar 2021 06:02) (72 - 82)  BP: 161/81 (30 Mar 2021 06:02) (157/67 - 161/81)  BP(mean): --  RR: 17 (30 Mar 2021 06:02) (17 - 18)  SpO2: 95% (30 Mar 2021 06:02) (95% - 96%)  I&O's Detail      PHYSICAL EXAMINATION:    GENERAL: The patient is a well-developed, well-nourished _____in no apparent distress.     HEENT: Head is normocephalic and atraumatic. Extraocular muscles are intact. Mucous membranes are moist.     NECK: Supple.     LUNGS: Clear to auscultation without wheezing, rales, or rhonchi. Respirations unlabored    HEART: Regular rate and rhythm without murmur.    ABDOMEN: Soft, nontender, and nondistended.  No hepatosplenomegaly is noted.    EXTREMITIES: Without any cyanosis, clubbing, rash, lesions or edema.    NEUROLOGIC: Grossly intact.      LABS:                        13.5   2.40  )-----------( 149      ( 30 Mar 2021 07:24 )             38.3     03-30    131<L>  |  93<L>  |  8   ----------------------------<  177<H>  3.4<L>   |  27  |  0.92    Ca    8.0<L>      30 Mar 2021 07:24  Phos  3.0     03-30  Mg     1.8     03-30    TPro  6.7  /  Alb  3.2<L>  /  TBili  0.6  /  DBili  x   /  AST  35  /  ALT  42  /  AlkPhos  74  03-30    PT/INR - ( 30 Mar 2021 07:24 )   PT: 13.1 sec;   INR: 1.11 ratio         PTT - ( 30 Mar 2021 07:24 )  PTT:30.6 sec      CARDIAC MARKERS ( 30 Mar 2021 00:52 )  <0.015 ng/mL / x     / x     / x     / x        SARS-CoV-2: Detected: This Respiratory Panel uses polymerase chain reaction (PCR) to detect for   adenovirus; coronavirus (HKU1, NL63, 229E, OC43); human metapneumovirus   (hMPV); human enterovirus/rhinovirus (Entero/RV); influenza A; influenza   A/H1; influenza A/H3; influenza A/H1-2009; influenza B; parainfluenza   viruses 1, 2, 3, 4; respiratory syncytial virus; Mycoplasma pneumoniae;   Chlamydophila pneumoniae; and SARS-CoV-2. (03.30.21 @ 00:52) COVID-19 [employer] Interp: Negative: This test has been authorized for emergency use by the FDA. [employer] has validated this test to be accurate.   Results from antibody testing should not be used to inform infection   status.   A positive result is consistent with prior infection, or rarely be due to   past or present infection with non-SARS-CoV-2 coronavirus strains, such   as coronavirus HKU1, NL63, OC43, A3 or 229E.   A negative result does not rule out SARS-CoV-2 infection, particularly in   those who have beenin recent contact with the virus. (03.30.21 @ 10:25)     Serum Pro-Brain Natriuretic Peptide: 42 pg/mL (03-30-21 @ 00:52)      Procalcitonin, Serum: 0.07 ng/mL (03-30-21 @ 10:05)      MICROBIOLOGY:    RADIOLOGY & ADDITIONAL STUDIES:    CXR:  < from: Xray Chest 2 Views PA/Lat (03.30.21 @ 01:04) >  IMPRESSION: Bilateral patchy opacities. Consider Covid 19 infection    < end of copied text >    Ct scan chest:    ekg;    echo:

## 2021-03-30 NOTE — H&P ADULT - PROBLEM SELECTOR PLAN 1
patient presented due to SOB and cough for the past week  has been using her inhaler frequently with minimal relief  received albuterol and solumedrol 125 in ED  saturating well on room air   continue solumedrol 40mg q 8 for now  proventil PRN   monitor O2 sats

## 2021-03-30 NOTE — PROGRESS NOTE ADULT - PROBLEM SELECTOR PLAN 5
continue home med of valsartan and metoprolol with HOLD parameters  monitor BP and adjust meds as needed Patient has history of Hypertension on valsartan, chlorthalidone and amlodipine at home   C/w home meds with parameters  Hold Chlorthalidone as patient is hypokalemic and hyponatremic   Monitor BP and adjust meds as needed

## 2021-03-30 NOTE — H&P ADULT - PROBLEM SELECTOR PLAN 5
continue home med of valsartan and metoprolol with HOLD parameters  monitor BP and adjust meds as needed

## 2021-03-30 NOTE — CONSULT NOTE ADULT - PROBLEM SELECTOR RECOMMENDATION 9
isolation precautions  oxygen supp  monitor oxygen sat  Check LDH, LFTs, CRP, Ferritin, D-Dimer and procalcitonin  Vit C, D and zinc supp  montelukast 10 mgs po Qhs

## 2021-03-30 NOTE — ED ADULT NURSE NOTE - NSIMPLEMENTINTERV_GEN_ALL_ED
Implemented All Universal Safety Interventions:  [place] to call system. Call bell, personal items and telephone within reach. Instruct patient to call for assistance. Room bathroom lighting operational. Non-slip footwear when patient is off stretcher. Physically safe environment: no spills, clutter or unnecessary equipment. Stretcher in lowest position, wheels locked, appropriate side rails in place.

## 2021-03-30 NOTE — H&P ADULT - EXTREMITIES
Airway  Performed by: Roberto Carlos Dowell DO  Authorized by: Roberto Carlos Dowell DO     Final Airway Type:  Endotracheal airway  Final Endotracheal Airway*:  ETT  ETT Size (mm)*:  7.0  Cuff*:  Regular  Technique Used for Successful ETT Placement:  Direct laryngoscopy  Devices/Methods Used in Placement*:  Mask  Intubation Procedure*:  Preoxygenation, ETCO2, Atraumatic, Dentition Unchanged and Phaynx Clear  Insertion Site:  Oral  Blade Type*:  MAC  Blade Size*:  3  Cuff Volume (mL):  8  Secured at (cm)*:  22  Placement Verified by: auscultation and capnometry    Glottic View*:  1 - full view of glottis  Attempts*:  1   Patient Identified, Procedure confirmed, Emergency equipment available and Safety protocols followed  Location:  OR  Urgency:  Elective  Difficult Airway: No    Indications for Airway Management:  Anesthesia  Spontaneous Ventilation: absent    Sedation Level:  Anesthetized  MILS Maintained Throughout: No    Mask Difficulty Assessment:  2 - vent by mask + OA or adjuvant +/- NMBA  Performed By:  Anesthesiologist  Anesthesiologist:  Roberto Carlos Dowell DO  Start Time:  10/7/2019 6:47 PM         detailed exam

## 2021-03-30 NOTE — CONSULT NOTE ADULT - PROBLEM SELECTOR RECOMMENDATION 2
Bronchoddilators  steroids  monitor peak flow  Pfts as OP Bronchodilators  steroids  monitor peak flow  Pfts as OP

## 2021-03-30 NOTE — H&P ADULT - ATTENDING COMMENTS
Patient is a 70 year old female from home AAO x3, with PMH of HTN, HLD and asthma, who presented to the ED due to SOB and cough. Patient states that for the past week, she has been having more shortness of breath and dry cough. Patient has been taking her Ventolin inhaler almost every 4 hours over the week. Patient states that the inhaler helps her for about an hour and then once it wears off she starts feeling SOB again. Patient states she went to see her pulmonologist in [place] a few days ago and prescribed her an inhaler with steroids but stated that the pharmacy did not have it so she has been using her Ventolin inhaler in the meantime. Patient denies any wheezing. Denies any history of intubation or hospitalizations for her asthma. Currently patient denies any chest pain, nausea, vomiting, abdominal pain, diarrhea or constipation.       assessment  --  asthma exacerb, covid 19, hypokalemia. hyponatremia, h/o  HTN, HLD, asthma    plan  --  admit to med, zithromax, solumedrol, vit c, vitamin d, zinc, pepcid, singulair, contact and airborne isolation, cont albuterol inhaler, cont supportive care with tylenol prn, robitussin prn and O2 via nasal canula as needed, ivf, supplement potassium as needed    covid-19 antibody test, procalcitonin, D-dimer, esr, crp, ldh, ferritin, lactate, cbc, bmp, mg, phos, lipids, tsh, bld cx, ua, ucx      pulm cons

## 2021-03-30 NOTE — PROGRESS NOTE ADULT - PROBLEM SELECTOR PLAN 1
Patient presented due to SOB and cough for the past week  Has been using her inhaler frequently with minimal relief  S/p albuterol and solumedrol 125 mg IV in ED  Saturating well on room air   C/w solumedrol 40 mg q 8 hrs for today and taper down tomorrow to q12 hrs  Albuterol PRN Patient presented due to SOB and cough for the past week  Has been using her inhaler frequently with minimal relief  S/p albuterol and solumedrol 125 mg IV in ED  Saturating well on room air   C/w solumedrol 40 mg q 8 hrs for today and taper down tomorrow to q12 hrs  Albuterol PRN  Started on Zithromax x 5 days

## 2021-03-30 NOTE — H&P ADULT - PROBLEM SELECTOR PLAN 3
noted to have potassium of 2.9 on admission   likely due to frequent use of inhaler in past week  given 40mg potassium PO x1 and 10mg IV x3 in ED  monitor BMP daily and replace electrolytes as needed

## 2021-03-30 NOTE — PROGRESS NOTE ADULT - PROBLEM SELECTOR PLAN 4
Noted to have sodium of 128 on admission   given 1L NS bolus in ED  monitor BMP daily Noted to have sodium of 128 on admission   S/p 1L NS bolus in ED  C/w IVF  Monitor BMP daily

## 2021-03-30 NOTE — PROGRESS NOTE ADULT - ASSESSMENT
Patient is a 70 year old female from home AAO x3, with PMH of HTN, HLD and asthma, who presented to the ED due to SOB and cough. Patient admitted for asthma exacerbation

## 2021-03-30 NOTE — PROGRESS NOTE ADULT - PROBLEM SELECTOR PLAN 6
continue home med of atorvastatin  f/u lipid profile Continue w/ home med of atorvastatin  Lipid profile normal

## 2021-03-30 NOTE — H&P ADULT - HISTORY OF PRESENT ILLNESS
Patient is a 70 year old female from home AAO x3, with PMH of HTN, HLD and asthma, who presented to the ED due to SOB and cough. Patient states that for the past week, she has been having more shortness of breath and dry cough. Patient has been taking her Ventolin inhaler almost every 4 hours over the week. Patient states that the inhaler helps her for about an hour and then once it wears off she starts feeling SOB again. Patient states she went to see her pulmonologist in [place] a few days ago and prescribed her an inhaler with steroids but stated that the pharmacy did not have it so she has been using her Ventolin inhaler in the meantime. Patient denies any wheezing. Denies any history of intubation or hospitalizations for her asthma. Currently patient denies any chest pain, nausea, vomiting, abdominal pain, diarrhea or constipation.

## 2021-03-30 NOTE — ED ADULT NURSE NOTE - CADM POA PRESS ULCER
Carolina and Manju,     Called and discussed pt case with Dr Juanita Rivera at [phone] pm   Pt was seen by him today  Dr Juanita Rivera agrees pt is an odd presentaton with a breakthru lesion seen on aug imaging while on tysabri  Pt with jcv neg serum 0 12 in aug as well  Dr aware of repeat mri with dye and enh seen in this single lesion  Due to longevity on med as well as size of lesion, he recommends another updated mri head in short interval of time before deciding on another ms med for pt  Question of pml still exists again due to breakthru lesion with prior stability  Against pml is enh lesion as well as jcv negativity but again rare cases can exist   He feels the mri will help us to see how this particular single lesion is behaving  Also he was not aware pt was tysabri ab positive  Based on this data, we will hold tysabri at this point and get the imaging before deciding next step to again re assess any pml risk  Asked about lp  He rec mri head at this juncture unless delay in getting it  Will ask staff in am to call to get fit in appt for pt asap for mri head with and without contrast   Will hold on any further tysabri again due to tysabri ab positive and still under eval for early pml  Clinically pt asymptomatic per dr   If lesion looks most like ms, again options limited and will likely need ocrevus  Again risk from tysabri to ocrevus as well  Asked if need both lp and mri, and at this juncture mri recommended  Carolina, I have placed mri head with and without contrast to be done asap  Please call for pt to see how we can get study expeditied within our system  Carolina, also need to cancel tysabri infusion for 9/28  Please call infusion center first thing in am    Nora, please make sure there is an order for discontinuation of tysabri as pt is coming in tomorrow for visit with us as specifically timed ie after javier appt and before next tysabri infusion  Please review above with pt    If mri head not helpful or able to be done expeditiously, we can always do lp this week as well  Dr Amairani Dailey will also be available to review this next image to help with guidance on next step  Nora, call me in am before pt appt to review above details   Candido are at [facility] office in am No

## 2021-03-31 ENCOUNTER — TRANSCRIPTION ENCOUNTER (OUTPATIENT)
Age: 71
End: 2021-03-31

## 2021-03-31 VITALS
OXYGEN SATURATION: 94 % | DIASTOLIC BLOOD PRESSURE: 54 MMHG | SYSTOLIC BLOOD PRESSURE: 137 MMHG | RESPIRATION RATE: 18 BRPM | HEART RATE: 54 BPM | TEMPERATURE: 97 F

## 2021-03-31 LAB
ANION GAP SERPL CALC-SCNC: 11 MMOL/L — SIGNIFICANT CHANGE UP (ref 5–17)
BUN SERPL-MCNC: 14 MG/DL — SIGNIFICANT CHANGE UP (ref 7–18)
CALCIUM SERPL-MCNC: 8.7 MG/DL — SIGNIFICANT CHANGE UP (ref 8.4–10.5)
CHLORIDE SERPL-SCNC: 98 MMOL/L — SIGNIFICANT CHANGE UP (ref 96–108)
CO2 SERPL-SCNC: 27 MMOL/L — SIGNIFICANT CHANGE UP (ref 22–31)
CREAT SERPL-MCNC: 0.84 MG/DL — SIGNIFICANT CHANGE UP (ref 0.5–1.3)
GLUCOSE BLDC GLUCOMTR-MCNC: 133 MG/DL — HIGH (ref 70–99)
GLUCOSE BLDC GLUCOMTR-MCNC: 162 MG/DL — HIGH (ref 70–99)
GLUCOSE BLDC GLUCOMTR-MCNC: 174 MG/DL — HIGH (ref 70–99)
GLUCOSE BLDC GLUCOMTR-MCNC: 201 MG/DL — HIGH (ref 70–99)
GLUCOSE SERPL-MCNC: 133 MG/DL — HIGH (ref 70–99)
HCT VFR BLD CALC: 41.5 % — SIGNIFICANT CHANGE UP (ref 34.5–45)
HCV AB S/CO SERPL IA: 0.11 S/CO — SIGNIFICANT CHANGE UP (ref 0–0.99)
HCV AB SERPL-IMP: SIGNIFICANT CHANGE UP
HGB BLD-MCNC: 14.2 G/DL — SIGNIFICANT CHANGE UP (ref 11.5–15.5)
MAGNESIUM SERPL-MCNC: 2 MG/DL — SIGNIFICANT CHANGE UP (ref 1.6–2.6)
MCHC RBC-ENTMCNC: 31.3 PG — SIGNIFICANT CHANGE UP (ref 27–34)
MCHC RBC-ENTMCNC: 34.2 GM/DL — SIGNIFICANT CHANGE UP (ref 32–36)
MCV RBC AUTO: 91.4 FL — SIGNIFICANT CHANGE UP (ref 80–100)
NRBC # BLD: 0 /100 WBCS — SIGNIFICANT CHANGE UP (ref 0–0)
PHOSPHATE SERPL-MCNC: 3.1 MG/DL — SIGNIFICANT CHANGE UP (ref 2.5–4.5)
PLATELET # BLD AUTO: 181 K/UL — SIGNIFICANT CHANGE UP (ref 150–400)
POTASSIUM SERPL-MCNC: 3.9 MMOL/L — SIGNIFICANT CHANGE UP (ref 3.5–5.3)
POTASSIUM SERPL-SCNC: 3.9 MMOL/L — SIGNIFICANT CHANGE UP (ref 3.5–5.3)
RBC # BLD: 4.54 M/UL — SIGNIFICANT CHANGE UP (ref 3.8–5.2)
RBC # FLD: 12.3 % — SIGNIFICANT CHANGE UP (ref 10.3–14.5)
SODIUM SERPL-SCNC: 136 MMOL/L — SIGNIFICANT CHANGE UP (ref 135–145)
T3 SERPL-MCNC: 80 NG/DL — SIGNIFICANT CHANGE UP (ref 80–200)
T4 AB SER-ACNC: 9.7 UG/DL — SIGNIFICANT CHANGE UP (ref 4.6–12)
TSH SERPL-MCNC: 0.22 UU/ML — LOW (ref 0.34–4.82)
WBC # BLD: 12.05 K/UL — HIGH (ref 3.8–10.5)
WBC # FLD AUTO: 12.05 K/UL — HIGH (ref 3.8–10.5)

## 2021-03-31 PROCEDURE — 85610 PROTHROMBIN TIME: CPT

## 2021-03-31 PROCEDURE — 94640 AIRWAY INHALATION TREATMENT: CPT

## 2021-03-31 PROCEDURE — 71046 X-RAY EXAM CHEST 2 VIEWS: CPT

## 2021-03-31 PROCEDURE — 84100 ASSAY OF PHOSPHORUS: CPT

## 2021-03-31 PROCEDURE — 84484 ASSAY OF TROPONIN QUANT: CPT

## 2021-03-31 PROCEDURE — 86140 C-REACTIVE PROTEIN: CPT

## 2021-03-31 PROCEDURE — 0225U NFCT DS DNA&RNA 21 SARSCOV2: CPT

## 2021-03-31 PROCEDURE — 82962 GLUCOSE BLOOD TEST: CPT

## 2021-03-31 PROCEDURE — 80048 BASIC METABOLIC PNL TOTAL CA: CPT

## 2021-03-31 PROCEDURE — 82306 VITAMIN D 25 HYDROXY: CPT

## 2021-03-31 PROCEDURE — 83735 ASSAY OF MAGNESIUM: CPT

## 2021-03-31 PROCEDURE — 82728 ASSAY OF FERRITIN: CPT

## 2021-03-31 PROCEDURE — 83036 HEMOGLOBIN GLYCOSYLATED A1C: CPT

## 2021-03-31 PROCEDURE — 85730 THROMBOPLASTIN TIME PARTIAL: CPT

## 2021-03-31 PROCEDURE — 84443 ASSAY THYROID STIM HORMONE: CPT

## 2021-03-31 PROCEDURE — 83615 LACTATE (LD) (LDH) ENZYME: CPT

## 2021-03-31 PROCEDURE — 83880 ASSAY OF NATRIURETIC PEPTIDE: CPT

## 2021-03-31 PROCEDURE — 86769 SARS-COV-2 COVID-19 ANTIBODY: CPT

## 2021-03-31 PROCEDURE — 93005 ELECTROCARDIOGRAM TRACING: CPT

## 2021-03-31 PROCEDURE — 85027 COMPLETE CBC AUTOMATED: CPT

## 2021-03-31 PROCEDURE — 80061 LIPID PANEL: CPT

## 2021-03-31 PROCEDURE — 84436 ASSAY OF TOTAL THYROXINE: CPT

## 2021-03-31 PROCEDURE — 80053 COMPREHEN METABOLIC PANEL: CPT

## 2021-03-31 PROCEDURE — 84480 ASSAY TRIIODOTHYRONINE (T3): CPT

## 2021-03-31 PROCEDURE — 86803 HEPATITIS C AB TEST: CPT

## 2021-03-31 PROCEDURE — 99285 EMERGENCY DEPT VISIT HI MDM: CPT

## 2021-03-31 PROCEDURE — 82607 VITAMIN B-12: CPT

## 2021-03-31 PROCEDURE — 36415 COLL VENOUS BLD VENIPUNCTURE: CPT

## 2021-03-31 PROCEDURE — 84145 PROCALCITONIN (PCT): CPT

## 2021-03-31 PROCEDURE — 85652 RBC SED RATE AUTOMATED: CPT

## 2021-03-31 PROCEDURE — 85025 COMPLETE CBC W/AUTO DIFF WBC: CPT

## 2021-03-31 RX ORDER — ALBUTEROL 90 UG/1
1 AEROSOL, METERED ORAL
Qty: 0 | Refills: 0 | DISCHARGE

## 2021-03-31 RX ORDER — ASCORBIC ACID 60 MG
1 TABLET,CHEWABLE ORAL
Qty: 30 | Refills: 0
Start: 2021-03-31 | End: 2021-04-29

## 2021-03-31 RX ORDER — AZITHROMYCIN 500 MG/1
1 TABLET, FILM COATED ORAL
Qty: 3 | Refills: 0
Start: 2021-03-31 | End: 2021-04-02

## 2021-03-31 RX ORDER — ZINC SULFATE TAB 220 MG (50 MG ZINC EQUIVALENT) 220 (50 ZN) MG
1 TAB ORAL
Qty: 10 | Refills: 0
Start: 2021-03-31 | End: 2021-04-09

## 2021-03-31 RX ORDER — ACETAMINOPHEN 500 MG
2 TABLET ORAL
Qty: 40 | Refills: 0
Start: 2021-03-31 | End: 2021-04-04

## 2021-03-31 RX ORDER — ALBUTEROL 90 UG/1
2 AEROSOL, METERED ORAL
Qty: 2 | Refills: 0
Start: 2021-03-31 | End: 2021-04-29

## 2021-03-31 RX ORDER — CHOLECALCIFEROL (VITAMIN D3) 125 MCG
1 CAPSULE ORAL
Qty: 30 | Refills: 0
Start: 2021-03-31 | End: 2021-04-29

## 2021-03-31 RX ADMIN — Medication 81 MILLIGRAM(S): at 12:10

## 2021-03-31 RX ADMIN — Medication 2000 UNIT(S): at 12:10

## 2021-03-31 RX ADMIN — ENOXAPARIN SODIUM 40 MILLIGRAM(S): 100 INJECTION SUBCUTANEOUS at 12:11

## 2021-03-31 RX ADMIN — Medication 25 MILLIGRAM(S): at 06:27

## 2021-03-31 RX ADMIN — Medication 40 MILLIGRAM(S): at 00:58

## 2021-03-31 RX ADMIN — Medication 1: at 12:11

## 2021-03-31 RX ADMIN — Medication 1000 MILLIGRAM(S): at 12:10

## 2021-03-31 RX ADMIN — Medication 40 MILLIGRAM(S): at 08:20

## 2021-03-31 RX ADMIN — AZITHROMYCIN 250 MILLIGRAM(S): 500 TABLET, FILM COATED ORAL at 12:11

## 2021-03-31 RX ADMIN — VALSARTAN 320 MILLIGRAM(S): 80 TABLET ORAL at 06:27

## 2021-03-31 RX ADMIN — ZINC SULFATE TAB 220 MG (50 MG ZINC EQUIVALENT) 220 MILLIGRAM(S): 220 (50 ZN) TAB at 12:10

## 2021-03-31 NOTE — DISCHARGE NOTE NURSING/CASE MANAGEMENT/SOCIAL WORK - PATIENT PORTAL LINK FT
You can access the FollowMyHealth Patient Portal offered by [facility] by registering at the following website: [email or web address]. By joining [employer]’s FollowMyHealth portal, you will also be able to view your health information using other applications (apps) compatible with our system.

## 2021-03-31 NOTE — PROGRESS NOTE ADULT - PROBLEM SELECTOR PLAN 2
On admission, patient c/o cough, sob  CXR b/l patchy opacities    EKG NSR, troponin x1 neg  COVID19 PCR +ve  F/u COVID19 AB, LDH, Procalcitonin, ferritin, D-Dimer, CRP, troponin  Tylenol, Albuterol Inhaler, Robitussin PRN  Monitor respiratory status  Not requiring Decadron or Remdesivir   Start oxygen supplementation if O2 sat <92%  Isolation precautions
Bronchodilators  Steroids  Pfts as OP

## 2021-03-31 NOTE — PROGRESS NOTE ADULT - SUBJECTIVE AND OBJECTIVE BOX
Patient is a 70y old  Female who presents with a chief complaint of SOB (31 Mar 2021 10:48)    pt seen in icu [  ], reg med floor [   ], bed [  ], chair at bedside [   ], a+o x3 [  ], lethargic [  ],  nad [  ]    torres [  ], ngt [  ], peg [  ], et tube [  ], cent line [  ], picc line [  ]        Allergies    No Known Allergies        Vitals    T(F): 98.3 (03-31-21 @ 05:13), Max: 98.7 (03-30-21 @ 13:44)  HR: 57 (03-31-21 @ 05:13) (57 - 75)  BP: 143/67 (03-31-21 @ 05:13) (135/50 - 143/69)  RR: 17 (03-31-21 @ 05:13) (17 - 18)  SpO2: 95% (03-31-21 @ 05:13) (95% - 96%)  Wt(kg): --  CAPILLARY BLOOD GLUCOSE      POCT Blood Glucose.: 174 mg/dL (31 Mar 2021 11:48)      Labs                          14.2   12.05 )-----------( 181      ( 31 Mar 2021 09:16 )             41.5       03-31    136  |  98  |  14  ----------------------------<  133<H>  3.9   |  27  |  0.84    Ca    8.7      31 Mar 2021 09:16  Phos  3.1     03-31  Mg     2.0     03-31    TPro  6.7  /  Alb  3.2<L>  /  TBili  0.6  /  DBili  x   /  AST  35  /  ALT  42  /  AlkPhos  74  03-30      CARDIAC MARKERS ( 30 Mar 2021 00:52 )  <0.015 ng/mL / x     / x     / x     / x                Radiology Results      Meds    MEDICATIONS  (STANDING):  ascorbic acid 1000 milliGRAM(s) Oral daily  aspirin enteric coated 81 milliGRAM(s) Oral daily  atorvastatin 20 milliGRAM(s) Oral at bedtime  azithromycin   Tablet 250 milliGRAM(s) Oral daily  cholecalciferol 2000 Unit(s) Oral daily  enoxaparin Injectable 40 milliGRAM(s) SubCutaneous daily  insulin lispro (ADMELOG) corrective regimen sliding scale   SubCutaneous three times a day before meals  insulin lispro (ADMELOG) corrective regimen sliding scale   SubCutaneous at bedtime  methylPREDNISolone sodium succinate Injectable 40 milliGRAM(s) IV Push <User Schedule>  metoprolol succinate ER 25 milliGRAM(s) Oral daily  sodium chloride 0.9%. 1000 milliLiter(s) (65 mL/Hr) IV Continuous <Continuous>  valsartan 320 milliGRAM(s) Oral daily  zinc sulfate 220 milliGRAM(s) Oral daily      MEDICATIONS  (PRN):  acetaminophen   Tablet .. 650 milliGRAM(s) Oral every 6 hours PRN Temp greater or equal to 38C (100.4F), Moderate Pain (4 - 6)  ALBUTerol    90 MICROgram(s) HFA Inhaler 2 Puff(s) Inhalation every 6 hours PRN Shortness of Breath and/or Wheezing  ondansetron Injectable 4 milliGRAM(s) IV Push every 6 hours PRN Nausea and/or Vomiting      Physical Exam    Neuro :  no focal deficits  Respiratory: CTA B/L  CV: RRR, S1S2, no murmurs,   Abdominal: Soft, NT, ND +BS,  Extremities: No edema, + peripheral pulses    ASSESSMENT    Infection due to severe acute respiratory syndrome coronavirus 2 (SARS-CoV-2)    HTN (hypertension)    HLD (hyperlipidemia)    Asthma    No significant past surgical history    S/P total hysterectomy    H/O shoulder surgery    H/O knee surgery        PLAN     Patient is a 70y old  Female who presents with a chief complaint of SOB (31 Mar 2021 10:48)    pt seen in icu [  ], reg med floor [ x  ], bed [ x ], chair at bedside [   ], a+o x3 [x  ], lethargic [  ],  nad [ x ]    Allergies    No Known Allergies        Vitals    T(F): 98.3 (03-31-21 @ 05:13), Max: 98.7 (03-30-21 @ 13:44)  HR: 57 (03-31-21 @ 05:13) (57 - 75)  BP: 143/67 (03-31-21 @ 05:13) (135/50 - 143/69)  RR: 17 (03-31-21 @ 05:13) (17 - 18)  SpO2: 95% (03-31-21 @ 05:13) (95% - 96%)  Wt(kg): --  CAPILLARY BLOOD GLUCOSE      POCT Blood Glucose.: 174 mg/dL (31 Mar 2021 11:48)      Labs                          14.2   12.05 )-----------( 181      ( 31 Mar 2021 09:16 )             41.5       03-31    136  |  98  |  14  ----------------------------<  133<H>  3.9   |  27  |  0.84    Ca    8.7      31 Mar 2021 09:16  Phos  3.1     03-31  Mg     2.0     03-31    TPro  6.7  /  Alb  3.2<L>  /  TBili  0.6  /  DBili  x   /  AST  35  /  ALT  42  /  AlkPhos  74  03-30      CARDIAC MARKERS ( 30 Mar 2021 00:52 )  <0.015 ng/mL / x     / x     / x     / x          COVID-19 Jagdish Domain Antibody (03.30.21 @ 10:25)   COVID-19 Jagdish Domain AB Interp: Negative      Radiology Results      Meds    MEDICATIONS  (STANDING):  ascorbic acid 1000 milliGRAM(s) Oral daily  aspirin enteric coated 81 milliGRAM(s) Oral daily  atorvastatin 20 milliGRAM(s) Oral at bedtime  azithromycin   Tablet 250 milliGRAM(s) Oral daily  cholecalciferol 2000 Unit(s) Oral daily  enoxaparin Injectable 40 milliGRAM(s) SubCutaneous daily  insulin lispro (ADMELOG) corrective regimen sliding scale   SubCutaneous three times a day before meals  insulin lispro (ADMELOG) corrective regimen sliding scale   SubCutaneous at bedtime  methylPREDNISolone sodium succinate Injectable 40 milliGRAM(s) IV Push <User Schedule>  metoprolol succinate ER 25 milliGRAM(s) Oral daily  sodium chloride 0.9%. 1000 milliLiter(s) (65 mL/Hr) IV Continuous <Continuous>  valsartan 320 milliGRAM(s) Oral daily  zinc sulfate 220 milliGRAM(s) Oral daily      MEDICATIONS  (PRN):  acetaminophen   Tablet .. 650 milliGRAM(s) Oral every 6 hours PRN Temp greater or equal to 38C (100.4F), Moderate Pain (4 - 6)  ALBUTerol    90 MICROgram(s) HFA Inhaler 2 Puff(s) Inhalation every 6 hours PRN Shortness of Breath and/or Wheezing  ondansetron Injectable 4 milliGRAM(s) IV Push every 6 hours PRN Nausea and/or Vomiting      Physical Exam    Neuro :  no focal deficits  Respiratory: CTA B/L  CV: RRR, S1S2, no murmurs,   Abdominal: Soft, NT, ND +BS,  Extremities: No edema, + peripheral pulses    ASSESSMENT      asthma exacerb,   covid 19,   hypokalemia.   hyponatremia,   h/o HTN (hypertension)  HLD (hyperlipidemia)  Asthma  S/P total hysterectomy  shoulder surgery  knee surgery        PLAN    cont zithromax to complete 5 days,   change steroid to prednisone 40 mg daily and taper 10 mg over 5days ,   cont vit c, vitamin d, zinc, pepcid, singulair,   contact and airborne isolation,   cont albuterol inhaler,   robitussin prn   O2 sat (95% - 96%) on ra  pt afebrile   pulm f/u   pft outpt  serum sodium and potassium wnl  pt stable for d/c

## 2021-03-31 NOTE — DISCHARGE NOTE PROVIDER - NSDCCPCAREPLAN_GEN_ALL_CORE_FT
PRINCIPAL DISCHARGE DIAGNOSIS  Diagnosis: Asthma exacerbation  Assessment and Plan of Treatment: You were admitted for an acute asthma excaberation which means you had worsening of your asthma symptoms and lung function. You presented with shortness of breath, wheezing and chest tightness. You were treated with IV CORTICOSTEROIDS and BRONCHODILATORS and did not require supplemental oxygen since you were saturating well on room air. Upon discharge, please continue to take AZITHROMYCIN 250 MG ONCE A DAY FOR 3 DAYS and follow up with your PCP in a week from discharge.      SECONDARY DISCHARGE DIAGNOSES  Diagnosis: COVID-19 virus infection  Assessment and Plan of Treatment: You were diagnosed with COVID19 infection. You did not required oxygen supplementation to maintain your oxygen levels and no Remdesivir and/or Decadron was indicated.   Based on your current clinical status and stability, it has been determined that you no longer need hospitalization and can recover while remaining in self-quarantine at home. You should follow the prevention steps below:  1. You should restrict activities outside your home, except for getting medical care.   2. Do not go to work, school, or public areas.   3. Avoid using public transportation, ride-sharing, or taxis.   4. Separate yourself from other people and animals in your home as much as possible.  When you are around other people (e.g., sharing a room or vehicle) you should wear a facemask.  5. Wash your hands often with soap and water for at least 20 seconds, especially after blowing your nose, coughing, or sneezing; going to the bathroom; and before eating or preparing food.  6. Cover your mouth and nose with a tissue when you cough or sneeze. Throw used tissues in a lined trash can. Immediately wash your hands with soap and water for at least 20 seconds  7. High touch surfaces include counters, tabletops, doorknobs, bathroom fixtures, toilets, phones, keyboards, tablets, and bedside tables.  8. Avoid sharing dishes, drinking glasses, cups, eating utensils, towels, or bedding with other people or pets in your home. After using these items, they should be washed thoroughly with soap and water.  9. You are strongly advised to seek prompt medical attention if your illness worsens or you develop new symptoms like fever or difficulty breathing, oxygen saturation <92%.  You are being discharge on ASPIRIN DAILY FOR 30 DAYS, OXYGEN SENSOR TO USE 3 TIMES A DAY, O2 SATURATION SHOULD BE >92%. Please follow up with your PCP in a week from discharge.      Diagnosis: Hypokalemia  Assessment and Plan of Treatment: You were diagnosed with hypokalemia which means you had low potassium in your blood which was most likely secondary to inhaler use. This may present with symptoms such as weakness, fatigue and consipation. You were treated with ORAL AND IV POTASSIUM. Please folllow up with your PCP a week from discharge with repeat BMP.    Diagnosis: Hyponatremia  Assessment and Plan of Treatment: You were diagnosed with hyponatremia which means you had low sodium in your blood. This may present with symptoms such as dizziness, fatigue, lethargy and confusion. You were treated with IV FLUIDS. Please follow up with your PCP a week from discharge with repeat BMP.    Diagnosis: HTN (hypertension)  Assessment and Plan of Treatment: You have a history of Hypertension ON AMLODIPINE, VALSARTAN AND CHLORTHALIDONE AT HOME.   On this admission, your Blood Pressure was adequately controlled with YOUR HOME MEDICATIONS.  Your blood pressure target is 120-140/80-90, maintain healthy lifestyle, low salt diet, avoid fatty food, weight loss, stay active as tolerated.  Notify your doctor if you have any of the following symptoms:   (Dizziness, Lightheadedness, Blurry vision, Headache, Chest pain, Shortness of breath.)  Please follow-up with your PCP in 1 week from discharge.     PRINCIPAL DISCHARGE DIAGNOSIS  Diagnosis: Asthma exacerbation  Assessment and Plan of Treatment: You were admitted for an acute asthma excaberation which means you had worsening of your asthma symptoms and lung function. You presented with shortness of breath, wheezing and chest tightness. You were treated with IV CORTICOSTEROIDS and BRONCHODILATORS and did not require supplemental oxygen since you were saturating well on room air. Upon discharge, please continue to take AZITHROMYCIN 250 MG ONCE A DAY FOR 3 DAYS (4/1/21-4/3/21), ALBUTEROL AND BREO ELLIPTA and follow up with your PCP in a week from discharge.      SECONDARY DISCHARGE DIAGNOSES  Diagnosis: COVID-19 virus infection  Assessment and Plan of Treatment: You were diagnosed with COVID19 infection. You did not required oxygen supplementation to maintain your oxygen levels and no Remdesivir and/or Decadron was indicated.   Based on your current clinical status and stability, it has been determined that you no longer need hospitalization and can recover while remaining in self-quarantine at home. You should follow the prevention steps below:  1. You should restrict activities outside your home, except for getting medical care.   2. Do not go to work, school, or public areas.   3. Avoid using public transportation, ride-sharing, or taxis.   4. Separate yourself from other people and animals in your home as much as possible.  When you are around other people (e.g., sharing a room or vehicle) you should wear a facemask.  5. Wash your hands often with soap and water for at least 20 seconds, especially after blowing your nose, coughing, or sneezing; going to the bathroom; and before eating or preparing food.  6. Cover your mouth and nose with a tissue when you cough or sneeze. Throw used tissues in a lined trash can. Immediately wash your hands with soap and water for at least 20 seconds  7. High touch surfaces include counters, tabletops, doorknobs, bathroom fixtures, toilets, phones, keyboards, tablets, and bedside tables.  8. Avoid sharing dishes, drinking glasses, cups, eating utensils, towels, or bedding with other people or pets in your home. After using these items, they should be washed thoroughly with soap and water.  9. You are strongly advised to seek prompt medical attention if your illness worsens or you develop new symptoms like fever or difficulty breathing, oxygen saturation <92%.  You are being discharge on ASPIRIN 81 MG DAILY FOR 30 DAYS, OXYGEN SENSOR TO USE 3 TIMES A DAY, O2 SATURATION SHOULD BE >92%. Please follow up with your PCP in a week from discharge.      Diagnosis: Hypokalemia  Assessment and Plan of Treatment: You were diagnosed with hypokalemia which means you had low potassium in your blood which was most likely secondary to inhaler use. This may present with symptoms such as weakness, fatigue and consipation. You were treated with ORAL AND IV POTASSIUM until it normalized. Please folllow up with your PCP a week from discharge.    Diagnosis: Hyponatremia  Assessment and Plan of Treatment: You were diagnosed with hyponatremia which means you had low sodium in your blood. This may present with symptoms such as dizziness, fatigue, lethargy and confusion. You were treated with IV FLUIDS until it normalized. Please follow up with your PCP a week from discharge.    Diagnosis: HTN (hypertension)  Assessment and Plan of Treatment: You have a history of Hypertension ON AMLODIPINE, VALSARTAN AND CHLORTHALIDONE AT HOME.   On this admission, your Blood Pressure was adequately controlled with YOUR HOME MEDICATIONS EXCEPT CHLORTHALIDONE DUE TO LOW POTASSIUM.  Your blood pressure target is 120-140/80-90, maintain healthy lifestyle, low salt diet, avoid fatty food, weight loss, stay active as tolerated.  Notify your doctor if you have any of the following symptoms:   (Dizziness, Lightheadedness, Blurry vision, Headache, Chest pain, Shortness of breath.)  You are being discharge on your HOME medications. Please follow-up with your PCP in 1 week from discharge.     PRINCIPAL DISCHARGE DIAGNOSIS  Diagnosis: Asthma exacerbation  Assessment and Plan of Treatment: You were admitted for an acute asthma excaberation which means you had worsening of your asthma symptoms and lung function. You presented with shortness of breath, wheezing and chest tightness. You were treated with IV CORTICOSTEROIDS and BRONCHODILATORS and did not require supplemental oxygen since you were saturating well on room air. Upon discharge, please continue to take AZITHROMYCIN 250 MG ONCE A DAY FOR 3 DAYS (4/1/21-4/3/21), ALBUTEROL, BREO ELLIPTA AND PREDNISONE 10 MG TAB TAPER AS FOLLOWS:  4 tab(s) = 40MG orally once a day x 2 days  3 tab(s) = 30 MG orally once a day x 2 days  2 tab(s) = 20 MG orally once a day x 2 days  1 tab(s) = 10 MG orally once a day x 1 day  Please follow up with your PCP in a week from discharge.      SECONDARY DISCHARGE DIAGNOSES  Diagnosis: Hypokalemia  Assessment and Plan of Treatment: You were diagnosed with hypokalemia which means you had low potassium in your blood which was most likely secondary to inhaler use. This may present with symptoms such as weakness, fatigue and consipation. You were treated with ORAL AND IV POTASSIUM until it normalized. Please folllow up with your PCP a week from discharge.    Diagnosis: Hyponatremia  Assessment and Plan of Treatment: You were diagnosed with hyponatremia which means you had low sodium in your blood. This may present with symptoms such as dizziness, fatigue, lethargy and confusion. You were treated with IV FLUIDS until it normalized. Please follow up with your PCP a week from discharge.    Diagnosis: COVID-19 virus infection  Assessment and Plan of Treatment: You were diagnosed with COVID19 infection. You did not required oxygen supplementation to maintain your oxygen levels and no Remdesivir and/or Decadron was indicated.   Based on your current clinical status and stability, it has been determined that you no longer need hospitalization and can recover while remaining in self-quarantine at home. You should follow the prevention steps below:  1. You should restrict activities outside your home, except for getting medical care.   2. Do not go to work, school, or public areas.   3. Avoid using public transportation, ride-sharing, or taxis.   4. Separate yourself from other people and animals in your home as much as possible.  When you are around other people (e.g., sharing a room or vehicle) you should wear a facemask.  5. Wash your hands often with soap and water for at least 20 seconds, especially after blowing your nose, coughing, or sneezing; going to the bathroom; and before eating or preparing food.  6. Cover your mouth and nose with a tissue when you cough or sneeze. Throw used tissues in a lined trash can. Immediately wash your hands with soap and water for at least 20 seconds  7. High touch surfaces include counters, tabletops, doorknobs, bathroom fixtures, toilets, phones, keyboards, tablets, and bedside tables.  8. Avoid sharing dishes, drinking glasses, cups, eating utensils, towels, or bedding with other people or pets in your home. After using these items, they should be washed thoroughly with soap and water.  9. You are strongly advised to seek prompt medical attention if your illness worsens or you develop new symptoms like fever or difficulty breathing, oxygen saturation <92%.  You are being discharge on ASPIRIN 81 MG DAILY FOR 30 DAYS, OXYGEN SENSOR TO USE 3 TIMES A DAY, O2 SATURATION SHOULD BE >92%. Please follow up with your PCP in a week from discharge.      Diagnosis: HTN (hypertension)  Assessment and Plan of Treatment: You have a history of Hypertension ON AMLODIPINE, VALSARTAN AND CHLORTHALIDONE AT HOME.   On this admission, your Blood Pressure was adequately controlled with YOUR HOME MEDICATIONS EXCEPT CHLORTHALIDONE DUE TO LOW POTASSIUM.  Your blood pressure target is 120-140/80-90, maintain healthy lifestyle, low salt diet, avoid fatty food, weight loss, stay active as tolerated.  Notify your doctor if you have any of the following symptoms:   (Dizziness, Lightheadedness, Blurry vision, Headache, Chest pain, Shortness of breath.)  You are being discharge on your HOME medications. Please follow-up with your PCP in 1 week from discharge.    Diagnosis: Hyperlipidemia  Assessment and Plan of Treatment: You have history of Hyperlipidemia. On this admission you were found to have abnormal high lipid profile.  Please continue to take your HOME medication ATORVASTATIN and maintain healthy lifestyle, low fat diet, stay active as tolerated and check your lipid levels routinely.   Please follow up with your PCP in 1 week from        PRINCIPAL DISCHARGE DIAGNOSIS  Diagnosis: Asthma exacerbation  Assessment and Plan of Treatment: You were admitted for an acute asthma excaberation which means you had worsening of your asthma symptoms and lung function. You presented with shortness of breath, wheezing and chest tightness. You were treated with IV CORTICOSTEROIDS and BRONCHODILATORS and did not require supplemental oxygen since you were saturating well on room air. Upon discharge, please continue to take AZITHROMYCIN 250 MG ONCE A DAY FOR 3 DAYS (4/1/21-4/3/21), ALBUTEROL, BREO ELLIPTA AND PREDNISONE 10 MG TAB TAPER AS FOLLOWS:  4 tab(s) = 40MG orally once a day x 2 days  3 tab(s) = 30 MG orally once a day x 2 days  2 tab(s) = 20 MG orally once a day x 2 days  1 tab(s) = 10 MG orally once a day x 1 day  Please follow up with your PCP in a week from discharge.      SECONDARY DISCHARGE DIAGNOSES  Diagnosis: COVID-19 virus infection  Assessment and Plan of Treatment: You were diagnosed with COVID19 infection. You did not required oxygen supplementation to maintain your oxygen levels and no Remdesivir and/or Decadron was indicated.   Based on your current clinical status and stability, it has been determined that you no longer need hospitalization and can recover while remaining in self-quarantine at home for 14 days. You should follow the prevention steps below:  1. You should restrict activities outside your home, except for getting medical care.   2. Do not go to work, school, or public areas.   3. Avoid using public transportation, ride-sharing, or taxis.   4. Separate yourself from other people and animals in your home as much as possible.  When you are around other people (e.g., sharing a room or vehicle) you should wear a facemask.  5. Wash your hands often with soap and water for at least 20 seconds, especially after blowing your nose, coughing, or sneezing; going to the bathroom; and before eating or preparing food.  6. Cover your mouth and nose with a tissue when you cough or sneeze. Throw used tissues in a lined trash can. Immediately wash your hands with soap and water for at least 20 seconds  7. High touch surfaces include counters, tabletops, doorknobs, bathroom fixtures, toilets, phones, keyboards, tablets, and bedside tables.  8. Avoid sharing dishes, drinking glasses, cups, eating utensils, towels, or bedding with other people or pets in your home. After using these items, they should be washed thoroughly with soap and water.  9. You are strongly advised to seek prompt medical attention if your illness worsens or you develop new symptoms like fever or difficulty breathing, oxygen saturation <92%.  You are being discharge on ASPIRIN 81 MG DAILY FOR 30 DAYS, OXYGEN SENSOR TO USE 3 TIMES A DAY, O2 SATURATION SHOULD BE >92%. Please follow up with your PCP in a week from discharge.      Diagnosis: Hypokalemia  Assessment and Plan of Treatment: You were diagnosed with hypokalemia which means you had low potassium in your blood which was most likely secondary to inhaler use. This may present with symptoms such as weakness, fatigue and consipation. You were treated with ORAL AND IV POTASSIUM until it normalized. Please folllow up with your PCP a week from discharge.    Diagnosis: Hyponatremia  Assessment and Plan of Treatment: You were diagnosed with hyponatremia which means you had low sodium in your blood. This may present with symptoms such as dizziness, fatigue, lethargy and confusion. You were treated with IV FLUIDS until it normalized. Please follow up with your PCP a week from discharge.    Diagnosis: HTN (hypertension)  Assessment and Plan of Treatment: You have a history of Hypertension ON AMLODIPINE, VALSARTAN AND CHLORTHALIDONE AT HOME.   On this admission, your Blood Pressure was adequately controlled with YOUR HOME MEDICATIONS EXCEPT CHLORTHALIDONE DUE TO LOW POTASSIUM.  Your blood pressure target is 120-140/80-90, maintain healthy lifestyle, low salt diet, avoid fatty food, weight loss, stay active as tolerated.  Notify your doctor if you have any of the following symptoms:   (Dizziness, Lightheadedness, Blurry vision, Headache, Chest pain, Shortness of breath.)  You are being discharge on your HOME medications. Please follow-up with your PCP in 1 week from discharge.    Diagnosis: Hyperlipidemia  Assessment and Plan of Treatment: You have history of Hyperlipidemia. On this admission you were found to have abnormal high lipid profile.  Please continue to take your HOME medication ATORVASTATIN and maintain healthy lifestyle, low fat diet, stay active as tolerated and check your lipid levels routinely.   Please follow up with your PCP in 1 week from

## 2021-03-31 NOTE — PROGRESS NOTE ADULT - SUBJECTIVE AND OBJECTIVE BOX
Patient is a 70y old  Female who presents with a chief complaint of SOB (31 Mar 2021 06:47)  Awake, alert, comfortable in bed in NAD    INTERVAL HPI/OVERNIGHT EVENTS:      VITAL SIGNS:  T(F): 98.3 (03-31-21 @ 05:13)  HR: 57 (03-31-21 @ 05:13)  BP: 143/67 (03-31-21 @ 05:13)  RR: 17 (03-31-21 @ 05:13)  SpO2: 95% (03-31-21 @ 05:13)  Wt(kg): --  I&O's Detail          REVIEW OF SYSTEMS:    CONSTITUTIONAL:  No fevers, chills, sweats    HEENT:  Eyes:  No diplopia or blurred vision. ENT:  No earache, sore throat or runny nose.    CARDIOVASCULAR:  No pressure, squeezing, tightness, or heaviness about the chest; no palpitations.    RESPIRATORY:  Per HPI    GASTROINTESTINAL:  No abdominal pain, nausea, vomiting or diarrhea.    GENITOURINARY:  No dysuria, frequency or urgency.    NEUROLOGIC:  No paresthesias, fasciculations, seizures or weakness.    PSYCHIATRIC:  No disorder of thought or mood.      PHYSICAL EXAM:    Constitutional: Well developed and nourished  Eyes:Perrla  ENMT: normal  Neck:supple  Respiratory: good air entry  Cardiovascular: S1 S2 regular  Gastrointestinal: Soft, Non tender  Extremities: No edema  Vascular:normal  Neurological:Awake, alert,Ox3  Musculoskeletal:Normal      MEDICATIONS  (STANDING):  ascorbic acid 1000 milliGRAM(s) Oral daily  aspirin enteric coated 81 milliGRAM(s) Oral daily  atorvastatin 20 milliGRAM(s) Oral at bedtime  azithromycin   Tablet 250 milliGRAM(s) Oral daily  cholecalciferol 2000 Unit(s) Oral daily  enoxaparin Injectable 40 milliGRAM(s) SubCutaneous daily  insulin lispro (ADMELOG) corrective regimen sliding scale   SubCutaneous three times a day before meals  insulin lispro (ADMELOG) corrective regimen sliding scale   SubCutaneous at bedtime  methylPREDNISolone sodium succinate Injectable 40 milliGRAM(s) IV Push <User Schedule>  metoprolol succinate ER 25 milliGRAM(s) Oral daily  sodium chloride 0.9%. 1000 milliLiter(s) (65 mL/Hr) IV Continuous <Continuous>  valsartan 320 milliGRAM(s) Oral daily  zinc sulfate 220 milliGRAM(s) Oral daily    MEDICATIONS  (PRN):  acetaminophen   Tablet .. 650 milliGRAM(s) Oral every 6 hours PRN Temp greater or equal to 38C (100.4F), Moderate Pain (4 - 6)  ALBUTerol    90 MICROgram(s) HFA Inhaler 2 Puff(s) Inhalation every 6 hours PRN Shortness of Breath and/or Wheezing  ondansetron Injectable 4 milliGRAM(s) IV Push every 6 hours PRN Nausea and/or Vomiting      Allergies    No Known Allergies    Intolerances        LABS:                        14.2   12.05 )-----------( 181      ( 31 Mar 2021 09:16 )             41.5     03-31    136  |  98  |  14  ----------------------------<  133<H>  3.9   |  27  |  0.84    Ca    8.7      31 Mar 2021 09:16  Phos  3.1     03-31  Mg     2.0     03-31    TPro  6.7  /  Alb  3.2<L>  /  TBili  0.6  /  DBili  x   /  AST  35  /  ALT  42  /  AlkPhos  74  03-30    PT/INR - ( 30 Mar 2021 07:24 )   PT: 13.1 sec;   INR: 1.11 ratio         PTT - ( 30 Mar 2021 07:24 )  PTT:30.6 sec      CARDIAC MARKERS ( 30 Mar 2021 00:52 )  <0.015 ng/mL / x     / x     / x     / x          CAPILLARY BLOOD GLUCOSE      POCT Blood Glucose.: 133 mg/dL (31 Mar 2021 08:24)  POCT Blood Glucose.: 201 mg/dL (30 Mar 2021 21:38)  POCT Blood Glucose.: 162 mg/dL (30 Mar 2021 17:24)    pro-bnp 42 03-30 @ 00:52     d-dimer --  03-30 @ 00:52      RADIOLOGY & ADDITIONAL TESTS:    CXR:    Ct scan chest:    ekg;    echo:

## 2021-03-31 NOTE — DISCHARGE NOTE PROVIDER - NSDCMRMEDTOKEN_GEN_ALL_CORE_FT
alendronate 35 mg oral tablet: 1 tab(s) orally once a week  amLODIPine 2.5 mg oral tablet: 1 tab(s) orally once a day  Aspir 81 oral delayed release tablet: 1 tab(s) orally once a day  Breo Ellipta 100 mcg-25 mcg/inh inhalation powder: 1 puff(s) inhaled once a day  chlorthalidone 25 mg oral tablet: 1 tab(s) orally once a day  Lipitor 40 mg oral tablet: 1 tab(s) orally once a day  metoprolol succinate 25 mg oral tablet, extended release: 1 tab(s) orally once a day  valsartan 320 mg oral tablet: 1 tab(s) orally once a day  Ventolin 90 mcg/inh inhalation aerosol: 1  inhaled once a day   acetaminophen 325 mg oral tablet: 2 tab(s) orally every 6 hours, As needed, Temp greater or equal to 38C (100.4F), Moderate Pain (4 - 6)  alendronate 35 mg oral tablet: 1 tab(s) orally once a week  amLODIPine 2.5 mg oral tablet: 1 tab(s) orally once a day  ascorbic acid 1000 mg oral tablet: 1 tab(s) orally once a day  Aspir 81 oral delayed release tablet: 1 tab(s) orally once a day  azithromycin 250 mg oral tablet: 1 tab(s) orally once a day, first dose 4/1/21 - 4/3/21  Breo Ellipta 100 mcg-25 mcg/inh inhalation powder: 1 puff(s) inhaled once a day  chlorthalidone 25 mg oral tablet: 1 tab(s) orally once a day  cholecalciferol oral tablet: 1 tab(s) orally once a day   Lipitor 40 mg oral tablet: 1 tab(s) orally once a day  metoprolol succinate 25 mg oral tablet, extended release: 1 tab(s) orally once a day  predniSONE 10 mg oral tablet: 4 tab(s) orally once a day x 2 days  3 tab(s) orally once a day x 2 days  2 tab(s) orally once a day x 2 days  1 tab(s) orally once a day x 1 day  ProAir HFA 90 mcg/inh inhalation aerosol: 2 puff(s) inhaled every 6 hours, As needed, Shortness of Breath and/or Wheezing  valsartan 320 mg oral tablet: 1 tab(s) orally once a day  zinc sulfate 220 mg oral capsule: 1 cap(s) orally once a day

## 2021-03-31 NOTE — DISCHARGE NOTE PROVIDER - EXTENDED VTE YES NO FOR MLM ENOXAPARIN
Patient's BP has been in the 90's systolic. Pt currently tachycardic
low bp 82/57
pt vomited up pain meds
pt with low BP 84/62 VANCE, 90/55 PORTILLO, HR  130
,

## 2021-03-31 NOTE — PROGRESS NOTE ADULT - PROBLEM SELECTOR PLAN 1
isolation precautions  oxygen ssupp  monitor oxygen sat  monitor LDH, LFTs, Ferritin, CRP, D-Dimer and procalcitonin  Vit C, D and zinc supp  montelukast 10 mgs po Qhs  follow up Covid PCR

## 2021-03-31 NOTE — DISCHARGE NOTE PROVIDER - CARE PROVIDER_API CALL
John Lara  INTERNAL MEDICINE  [phone] [place] [address]  Phone: [phone]  Fax: [phone]  Follow Up Time: 1 week

## 2021-03-31 NOTE — DISCHARGE NOTE PROVIDER - HOSPITAL COURSE
70 year old female from home has past medical hx of hypertension and asthma came to ED c/o worsening sob despite use of Ventolin at home. Patient admitted for Asthma exacerbation. Patient was saturating well on room air and did not require oxygen supplementation. Patient was treated with SOLUMEDROL IV, ALBUTEROL, AZITHROMYCIN.   Patient also tested positive for COVID19, antibodies negative, CXR showed bilateral patchy infiltrates, saturating well on room air, not requiring oxygen supplementation. Patient is being treated with supportive measures with Vitamin C, D and zinc.   Patient was noted to have potassium of 2.9 on admission most likely due to frequent inhaler use, replaced with oral and IV potassium, monitored with a daily BMP.   Patient was noted to have a sodium of 128 on admission, treated with IVF and monitored with a daily CMP.   Patient has a history of hypertension, continued on home medications VALSARTAN, METOPROLOL, held CHLORTHALIDONE due to hypokalemia.   Patient has a history of hyperlipidemia and was continued on home medication ATORVASTATIN, lipid panel was unremarkable.       Patient is stable for discharge and is advised to follow up with PCP as outpatient.  Please refer to patient's complete medical chart with documents for a full hospital course, for this is only a brief summary. 70 year old female from home has past medical hx of hypertension and asthma came to ED c/o worsening sob despite use of Ventolin at home.   Patient admitted for Asthma exacerbation. Patient was saturating well on room air and did not require oxygen supplementation. Patient was treated with SOLUMEDROL IV, ALBUTEROL, AZITHROMYCIN. Upon discharge, AZITHROMYCIN TO COMPLETE 5 DAYS AND PREDNISONE TAPER.  Patient also tested positive for COVID19, antibodies negative, CXR showed bilateral patchy infiltrates, saturating well on room air, not requiring oxygen supplementation. Patient is being treated with supportive measures with Vitamin C, D and zinc.   Patient was noted to have potassium of 2.9 on admission most likely due to frequent inhaler use, replaced with oral and IV potassium until it normalized.   Patient was noted to have a sodium of 128 on admission, most likely 2/2 dehydration, treated with IVF with subsequent normalization.   Patient has a history of hypertension, continued on home medications VALSARTAN, METOPROLOL, held CHLORTHALIDONE due to hypokalemia.   Patient has a history of hyperlipidemia and was continued on home medication ATORVASTATIN, lipid panel was unremarkable.       Patient is stable for discharge and is advised to follow up with PCP as outpatient.  Please refer to patient's complete medical chart with documents for a full hospital course, for this is only a brief summary.

## 2021-05-11 NOTE — ED ADULT NURSE NOTE - NSSISCREENINGQ4_ED_A_ED
Physical Therapy  Facility/Department: [address] IP REHAB  Daily Treatment Note  NAME: Sharmila Arambula  : 1955  MRN: [id number]    Date of Service: 2021    Discharge Recommendations:  3-5 sessions per week, [address], Patient would benefit from continued therapy after discharge   PT Equipment Recommendations  Equipment Needed: No  Other: defer to next level of care    Assessment   Body structures, Functions, Activity limitations: Decreased functional mobility ; Decreased strength;Decreased endurance;Decreased sensation;Decreased balance  Assessment: Mr. Deyanira Lpoez continues to require two person assist for safe mobilization secondary to tone and weakness. RLE tone improved this date, but patient with less vocalizations and less active. Patient was safer with transfer with less withdrawal, but he still required max assist of 2. He completed a shower with assist of PT and OT, but does still have some perseveration during activity, requiring verbal and tactile cueing to move on. Patient is well below baseline and will benefit from continued skilled therapy for strengthening, balance training, safety training, and neuromuscular re-education to allow for decreased assistance required. Treatment Diagnosis: impaired functional mobility  Prognosis: Guarded; Fair  PT Education: Functional Mobility Training;General Safety;Home Exercise Program;Disease Specific Education;Plan of Care;Transfer Training  REQUIRES PT FOLLOW UP: Yes  Activity Tolerance  Activity Tolerance: Patient limited by fatigue;Patient limited by cognitive status     Patient Diagnosis(es): There were no encounter diagnoses. has a past medical history of Acute MI ([facility] [address] Anxiety, Arthritis, CAD (coronary artery disease), Hypertension, and Influenza A.   has a past surgical history that includes back surgery (N/A, ) and Coronary angioplasty with stent ().      Social/Functional History  Lives With: Significant other(Sign other Jackquline Dux))  Type of Home: House  Home Layout: Two level, Able to Live on Main level with bedroom/bathroom, Laundry in basement(1 story with basement.)  Home Access: Stairs to enter without rails(1 step to enter.)  Bathroom Shower/Tub: Tub/Shower unit(Tub Shower main level and basement.)  Bathroom Toilet: Standard  Bathroom Equipment: Grab bars in shower  Bathroom Accessibility: Accessible  Home Equipment: (No DME.)  ADL Assistance: Independent  Homemaking Assistance: (Shared with sign other.)  Homemaking Responsibilities: (shared with significant other prior to admission)  Ambulation Assistance: Independent(Without assist device.)  Transfer Assistance: Independent  Active : Yes  Occupation: Retired  Type of occupation: 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 \"Need to find something to do. \"      Restrictions  Restrictions/Precautions  Restrictions/Precautions: Fall Risk  Position Activity Restriction  Other position/activity restrictions: R hemiplegia and increased tone RLE, exp. aphasia     Subjective   General  Chart Reviewed: Yes  Additional Pertinent Hx: Pt is 77 y/o M admitted 4/21/21 to ED with stroke like symptoms affecting R side ongoing ~12hours. MRI revealed multifocal acute infarctions in the parasagital left front along distal L LEXA territory with occluded/thrombosed A3 segment of L LEXA. 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 PMHx significant for CAD, Angio w/ stent, MI, lumbar sx, HTN. Pt is 77 y/o M admitted 4/21/21 to ED with stroke like symptoms affecting R side ongoing ~12hours. MRI revealed multifocal acute infarctions in the parasagital left front along distal L LEXA territory with occluded/thrombosed A3 segment of L LEXA. 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  PMHx significant for CAD, Angio w/ stent, MI, lumbar sx, HTN. Response To Previous Treatment: Patient with no complaints from previous session. Family / Caregiver Present: No  Referring Practitioner: Dr. Mylene Wilcox: Patient supine in bed upon arrival.  He continues to be minimally verbal.  Tone improved from yesterday. Patient was less animated this AM and required encouragement to verbalize any thoughts. General Comment  Comments: PT/OT concerned as patient was less talkative and more mellow. BP was assessed and was noted at 103/64 which was a considerable change from earlier BP. Objective   Bed mobility  Rolling to Left: Moderate assistance  Rolling to Right: Minimal assistance  Supine to Sit: Maximum assistance;2 Person assistance(max assist x2. patient required increased time to initiate mobility and try to do more on his own.)  Scooting: Maximal assistance;2 Person assistance(PT encouraged patient to scoot hips at EOB, but patient was unable to complete mobility and was mod - max of 2 depending on amount of scooting required)  Comment: bed mobility done on flat hospital bed with use of rails    Transfers  Sit to Stand: Maximum Assistance;2 Person Assistance(pulling on rail)  Stand to sit: Maximum Assistance;2 Person Assistance; Moderate Assistance(from standing at grab bar)  Stand Pivot Transfers: 2 Person Assistance;Maximum Assistance(stand pivot with grab bar or side rail on bed. Patient required blocking of right knee to extend knee.)  Comment: stand pivot bed>w/c>shower chair>w/c>standing at rail>w/c>bed    Ambulation  Ambulation?: No        Balance  Posture: Good  Sitting - Static: Good  Sitting - Dynamic: Good;-  Comments: Patient able to maintain balance while seated on shower chair without back support and intermittent use of grab bar. He was CGA when reaching to wash feet. Comment: PT assisting patient with ADL session with OT.   Patient was two person assist for all transfers with stand pivots. Patient completed a shower while seated on the shower chair with railing on left side. He continues to perseverates on washing his face and requires cueing to move away from his face. Please see OT note for further detail on ADL status. Patient stood at rail to pull pants and depends up. He continues to have minimal activation in right quad and maintains balance mostly on left LE. Right LE requires blocking at knee and foot to encourage weight bearing. PM Session  Patient started bedside and was supine in bed. Patient reports more verbal and alert this PM.  He states he felt better after resting in bed. Dr. Emma Apgar in to see patient for short discussion regarding medications. She states she will continue to monitor. Patient with shorts on that were small on patient and he requested hospital pants for comfort. Patient did initiate flexing up right LE to bridge, but required max assist to achieve hooklying with right LE. He was min-mod assist to bridge and required one person to block his right foot and maintain neutral hip position. He was dependent to change pants. Mod assist to roll onto left side. Supine>sit with mod assist of 2. Stand pivot transfer to the right with max assist of two. Patient with great difficulty releasing railing in left hand to sit into the wheelchair. Patient propelled wheelchair 160' using left UE and left LE. He began as SBA with cueing for straight path, but he fatigued and required min assist to correct veer to the right. Patient was unaware of path deviation and was unable to correct on his own as the fatigue increased. Sit>stand at martinez rail with max assist x2. Patient ambulated 21' with left martinez rail. Max assist of PT at right LE to advance LE, extend knee, and extend hip. He requires cueing for weight shifting and sequencing. OT provided min-mod assist for balance and weight shifting.     Patient more alert and vocal this PM.  Continues to have minimal activation in right LE. Goals  Short term goals  Time Frame for Short term goals: In 2 weeks pt will perform  Short term goal 1: Bed mobility Mod A  Short term goal 2: Transfers Mod A  Short term goal 3: Propel wc 48' with Mod A  Short term goal 4: Ascend/Descend curb step with LRAD, Mod A  Long term goals  Time Frame for Long term goals : In 4 weeks pt will perform  Long term goal 1: Bed mobility CGA  Long term goal 2: Transfers CGA  Long term goal 3: Propel wc 48' with CGA-SBA  Long term goal 4: Ascend/Descend curb step with LRAD, Min A  Patient Goals   Patient goals : Did not state today (minimally verbal)    Plan    Plan  Times per week: 5-6x  Times per day: Twice a day  Plan weeks: 4 weeks  Specific instructions for Next Treatment: Transfers; practice wc mobility; strengthen RLE as able  Current Treatment Recommendations: Strengthening, ROM, Balance Training, Endurance Training, Functional Mobility Training, Wheelchair Mobility Training, Transfer Training, Gait Training, Stair training, Positioning, Pain Management, Equipment Evaluation, Education, & procurement, Modalities, Patient/Caregiver Education & Training, Safety Education & Training, Home Exercise Program, Neuromuscular Re-education  Safety Devices  Type of devices: All fall risk precautions in place, Bed alarm in place, Call light within reach, Gait belt, Left in bed, Nurse notified(RN, Gayatri, aware.   OT, Emmanuel Lockett, with patient)  Restraints  Initially in place: No     Therapy Time   Individual Concurrent Group Co-treatment   Time In       0815   Time Out       0915   Minutes       60         Second Session Therapy Time     Individual Co-treatment   Time In  1300   Time Out  1330   Minutes  [address] [place] [address] No

## 2021-10-04 ENCOUNTER — EMERGENCY (EMERGENCY)
Facility: HOSPITAL | Age: 71
LOS: 1 days | Discharge: HOME | End: 2021-10-04
Attending: EMERGENCY MEDICINE
Payer: MEDICARE

## 2021-10-04 VITALS
OXYGEN SATURATION: 100 % | SYSTOLIC BLOOD PRESSURE: 158 MMHG | RESPIRATION RATE: 16 BRPM | DIASTOLIC BLOOD PRESSURE: 72 MMHG | HEART RATE: 62 BPM

## 2021-10-04 VITALS
SYSTOLIC BLOOD PRESSURE: 188 MMHG | DIASTOLIC BLOOD PRESSURE: 75 MMHG | OXYGEN SATURATION: 98 % | HEIGHT: 65 IN | RESPIRATION RATE: 16 BRPM | HEART RATE: 67 BPM | TEMPERATURE: 98 F | WEIGHT: 145.06 LBS

## 2021-10-04 DIAGNOSIS — Z98.890 OTHER SPECIFIED POSTPROCEDURAL STATES: Chronic | ICD-10-CM

## 2021-10-04 DIAGNOSIS — Z90.710 ACQUIRED ABSENCE OF BOTH CERVIX AND UTERUS: Chronic | ICD-10-CM

## 2021-10-04 LAB
ALBUMIN SERPL ELPH-MCNC: 3.2 G/DL — LOW (ref 3.5–5)
ALP SERPL-CCNC: 60 U/L — SIGNIFICANT CHANGE UP (ref 40–120)
ALT FLD-CCNC: 31 U/L DA — SIGNIFICANT CHANGE UP (ref 10–60)
ANION GAP SERPL CALC-SCNC: 5 MMOL/L — SIGNIFICANT CHANGE UP (ref 5–17)
AST SERPL-CCNC: 25 U/L — SIGNIFICANT CHANGE UP (ref 10–40)
BASOPHILS # BLD AUTO: 0.03 K/UL — SIGNIFICANT CHANGE UP (ref 0–0.2)
BASOPHILS NFR BLD AUTO: 0.5 % — SIGNIFICANT CHANGE UP (ref 0–2)
BILIRUB SERPL-MCNC: 0.7 MG/DL — SIGNIFICANT CHANGE UP (ref 0.2–1.2)
BUN SERPL-MCNC: 13 MG/DL — SIGNIFICANT CHANGE UP (ref 7–18)
CALCIUM SERPL-MCNC: 8.9 MG/DL — SIGNIFICANT CHANGE UP (ref 8.4–10.5)
CHLORIDE SERPL-SCNC: 102 MMOL/L — SIGNIFICANT CHANGE UP (ref 96–108)
CO2 SERPL-SCNC: 30 MMOL/L — SIGNIFICANT CHANGE UP (ref 22–31)
CREAT SERPL-MCNC: 0.85 MG/DL — SIGNIFICANT CHANGE UP (ref 0.5–1.3)
EOSINOPHIL # BLD AUTO: 0.21 K/UL — SIGNIFICANT CHANGE UP (ref 0–0.5)
EOSINOPHIL NFR BLD AUTO: 3.8 % — SIGNIFICANT CHANGE UP (ref 0–6)
GLUCOSE SERPL-MCNC: 120 MG/DL — HIGH (ref 70–99)
HCT VFR BLD CALC: 37.4 % — SIGNIFICANT CHANGE UP (ref 34.5–45)
HGB BLD-MCNC: 12.4 G/DL — SIGNIFICANT CHANGE UP (ref 11.5–15.5)
IMM GRANULOCYTES NFR BLD AUTO: 0.2 % — SIGNIFICANT CHANGE UP (ref 0–1.5)
LYMPHOCYTES # BLD AUTO: 2.16 K/UL — SIGNIFICANT CHANGE UP (ref 1–3.3)
LYMPHOCYTES # BLD AUTO: 38.8 % — SIGNIFICANT CHANGE UP (ref 13–44)
MCHC RBC-ENTMCNC: 30.8 PG — SIGNIFICANT CHANGE UP (ref 27–34)
MCHC RBC-ENTMCNC: 33.2 GM/DL — SIGNIFICANT CHANGE UP (ref 32–36)
MCV RBC AUTO: 93 FL — SIGNIFICANT CHANGE UP (ref 80–100)
MONOCYTES # BLD AUTO: 0.47 K/UL — SIGNIFICANT CHANGE UP (ref 0–0.9)
MONOCYTES NFR BLD AUTO: 8.4 % — SIGNIFICANT CHANGE UP (ref 2–14)
NEUTROPHILS # BLD AUTO: 2.69 K/UL — SIGNIFICANT CHANGE UP (ref 1.8–7.4)
NEUTROPHILS NFR BLD AUTO: 48.3 % — SIGNIFICANT CHANGE UP (ref 43–77)
NRBC # BLD: 0 /100 WBCS — SIGNIFICANT CHANGE UP (ref 0–0)
PLATELET # BLD AUTO: 218 K/UL — SIGNIFICANT CHANGE UP (ref 150–400)
POTASSIUM SERPL-MCNC: 3.5 MMOL/L — SIGNIFICANT CHANGE UP (ref 3.5–5.3)
POTASSIUM SERPL-SCNC: 3.5 MMOL/L — SIGNIFICANT CHANGE UP (ref 3.5–5.3)
PROT SERPL-MCNC: 6.5 G/DL — SIGNIFICANT CHANGE UP (ref 6–8.3)
RAPID RVP RESULT: SIGNIFICANT CHANGE UP
RBC # BLD: 4.02 M/UL — SIGNIFICANT CHANGE UP (ref 3.8–5.2)
RBC # FLD: 11.8 % — SIGNIFICANT CHANGE UP (ref 10.3–14.5)
SARS-COV-2 RNA SPEC QL NAA+PROBE: SIGNIFICANT CHANGE UP
SODIUM SERPL-SCNC: 137 MMOL/L — SIGNIFICANT CHANGE UP (ref 135–145)
WBC # BLD: 5.57 K/UL — SIGNIFICANT CHANGE UP (ref 3.8–10.5)
WBC # FLD AUTO: 5.57 K/UL — SIGNIFICANT CHANGE UP (ref 3.8–10.5)

## 2021-10-04 PROCEDURE — 96374 THER/PROPH/DIAG INJ IV PUSH: CPT

## 2021-10-04 PROCEDURE — 80053 COMPREHEN METABOLIC PANEL: CPT

## 2021-10-04 PROCEDURE — 93010 ELECTROCARDIOGRAM REPORT: CPT

## 2021-10-04 PROCEDURE — 71045 X-RAY EXAM CHEST 1 VIEW: CPT

## 2021-10-04 PROCEDURE — 94640 AIRWAY INHALATION TREATMENT: CPT

## 2021-10-04 PROCEDURE — 71045 X-RAY EXAM CHEST 1 VIEW: CPT | Mod: 26

## 2021-10-04 PROCEDURE — 99284 EMERGENCY DEPT VISIT MOD MDM: CPT | Mod: 25

## 2021-10-04 PROCEDURE — 93005 ELECTROCARDIOGRAM TRACING: CPT

## 2021-10-04 PROCEDURE — 85025 COMPLETE CBC W/AUTO DIFF WBC: CPT

## 2021-10-04 PROCEDURE — 0225U NFCT DS DNA&RNA 21 SARSCOV2: CPT

## 2021-10-04 PROCEDURE — 36415 COLL VENOUS BLD VENIPUNCTURE: CPT

## 2021-10-04 PROCEDURE — 99285 EMERGENCY DEPT VISIT HI MDM: CPT

## 2021-10-04 RX ORDER — IPRATROPIUM/ALBUTEROL SULFATE 18-103MCG
3 AEROSOL WITH ADAPTER (GRAM) INHALATION
Refills: 0 | Status: DISCONTINUED | OUTPATIENT
Start: 2021-10-04 | End: 2021-10-08

## 2021-10-04 RX ORDER — ALBUTEROL 90 UG/1
2 AEROSOL, METERED ORAL
Qty: 1 | Refills: 0
Start: 2021-10-04

## 2021-10-04 RX ORDER — BUDESONIDE AND FORMOTEROL FUMARATE DIHYDRATE 160; 4.5 UG/1; UG/1
2 AEROSOL RESPIRATORY (INHALATION)
Qty: 1 | Refills: 0
Start: 2021-10-04 | End: 2021-11-02

## 2021-10-04 RX ADMIN — Medication 60 MILLIGRAM(S): at 16:37

## 2021-10-04 RX ADMIN — Medication 3 MILLILITER(S): at 16:39

## 2021-10-04 RX ADMIN — Medication 3 MILLILITER(S): at 16:00

## 2021-10-04 NOTE — ED PROVIDER NOTE - CLINICAL SUMMARY MEDICAL DECISION MAKING FREE TEXT BOX
Patient with h/o asthma presents with shortness of breath and dry cough. Diminished breath sounds on exam but no wheezing. Will give duonebs and steroids, check labs, EKG, CXR, reassess.

## 2021-10-04 NOTE — ED ADULT NURSE NOTE - NS ED NURSE DISCH DISPOSITION
[facility] Procedure Note    Chas Mahmood is a 61 year old male patient.    Pre-Op Diagnosis: Obstructive hydrocephalus (CMS/HCC) [G91.1]     Post-Op Diagnosis: Post-Op Diagnosis Codes:     * Obstructive hydrocephalus (CMS/HCC) [G91.1]    Procedure:  Suture Removal    Performed by: Misty Rivas PA-C     Complications:  None    Patient identified at bedside.  Incision cleansed with alcohol.  Cranial sutures and abdominal suture removed.  No oozing or bleeding noted.  Continue to monitor incision for oozing or bleeding.  Pt tolerated the procedure well.      Misty Rivas PA-C  7/9/2021     Discharged

## 2021-10-04 NOTE — ED PROVIDER NOTE - OBJECTIVE STATEMENT
Patient reports 2 1/2 weeks of shortness of breath and dry cough. Flew from  on 10/18 but was symptomatic before her flight. Feels like her asthma. No fever, cp, ap, n/v/d. Has been admitted for asthma in the past but no ICU or intubations. No relief from albuterol. Also finished a course of azithromycin a few days ago without improvement.

## 2021-10-04 NOTE — ED PROVIDER NOTE - PATIENT PORTAL LINK FT
You can access the FollowMyHealth Patient Portal offered by [facility] by registering at the following website: [email or web address]. By joining [employer]’s FollowMyHealth portal, you will also be able to view your health information using other applications (apps) compatible with our system.

## 2021-10-04 NOTE — ED PROVIDER NOTE - NSFOLLOWUPINSTRUCTIONS_ED_ALL_ED_FT
Asma    LO QUE NECESITA SABER:    El asma es kassy enfermedad pulmonar que dificulta la respiración. La inflamación crónica y las reacciones a los desencadenantes estrechan las vías respiratorias en los pulmones. El asma puede ser de peligro mortal si no se mantiene bajo control.    Bronquiolos normales frente a bronquiolos con asma en adultos         INSTRUCCIONES SOBRE [facility]ITAL[facility]:    Llame al número de emergencias local (911 en los [place]) si:  •Usted tiene falta de aliento severa.      •La piel alrededor de dickerson stacey y costillas se hunde con cada respiración.      •Las lecturas numéricas del medidor de dickerson flujo itz están en la mark louise de dickerson plan de acción para el asma.      Regrese a la haily de emergencias si:  •Usted tiene falta de aliento, incluso después de maxwell dickerson medicamento a corto plazo según lo indicado.      •Morena labios y uñas se ponen azules o grises.      Llame a dickerson médico o especialista en asma si:  •A usted se le acaba el medicamento antes de la fecha de dickerson próximo abastecimiento.      •Morena síntomas empeoran.      •Usted necesita maxwell más medicamento que lo acostumbrado para controlar morena síntomas.      •Usted tiene preguntas o inquietudes acerca de dickerson condición o cuidado.      Medicamentos:  •Los medicamentospodrían usarse para disminuir la inflamación, abrir las vías respiratorias y facilitar la respiración. Los medicamentos se pueden inhalar, maxwell en forma de píldora o ser inyectados. Los medicamentos a corto plazo alivian morena síntomas con rapidez. Los medicamentos a rukhsana plazo sirven para evitar ataques de asma en un futuro. Es posible que se necesiten otros medicamentos si morena medicamentos habituales no logran prevenir los ataques. Es posible que además necesite medicamento para ayudar a controlar las alergias. Pídale a dickerson médico más información sobre cualquier medicamento que le están dando y cómo tomarlo de kassy forma willett.      •[place] morena medicamentos kings se le haya indicado.Consulte con dickerson médico si usted juan luis que dickerson medicamento no le está ayudando o si presenta efectos secundarios. Infórmele si es alérgico a algún medicamento. Mantenga kassy lista actualizada de los medicamentos, las vitaminas y los productos herbales que lashawn. Incluya los siguientes datos de los medicamentos: cantidad, frecuencia y motivo de administración. Traiga con usted la lista o los envases de las píldoras a morena citas de seguimiento. Lleve la lista de los medicamentos con usted en eduardo de kassy emergencia.      Controle morena síntomas y evite ataques futuros:  •Siga dickerson plan de acción para el asma.Bee es un plan por escrito que usted y dickerson médico [place] creado. Explica cuáles medicamentos necesita usted y cuándo cambiar las dosis si fuera necesario. Además explica kings usted puede monitorear morena síntomas y usar un medidor del flujo itz. El medidor mide qué tan norris están funcionando los pulmones.      •Controle otras afecciones de gavin, kings las alergias, el reflujo estomacal y la apnea de sueño.      •Identifique y evite factores desencadenantes.Estos podrían incluir las mascotas, los ácaros del polvo, el moho y las cucarachas.      •No fume o esté alrededor de personas que fuman.La nicotina y otras sustancias químicas que contienen los cigarrillos y cigarros pueden dañar los pulmones. Pida información a dickerson médico si usted actualmente fuma y necesita ayuda para dejar de fumar. Los cigarrillos electrónicos o el tabaco sin humo igualmente contienen nicotina. Consulte con dickerson médico antes de utilizar estos productos.      •Pregunte sobre la vacuna contra la gripe.La gripe puede empeorar dickerson asma. Puede que usted necesite recibir kassy vacuna anual contra la gripe.      Acuda a morena consultas de control con dickerson médico según le indicaron.Usted necesitará regresar para asegurarse que dickerson medicamento está funcionando y morena síntomas están bajo control. Es posible que lo refieran a un especialista del asma o de las alergias. A usted podrían pedirle que mantenga un registro de los valores de dickerson flujo itz y que lo traiga a morena citas. Anote morena preguntas para que se acuerde de hacerlas oskar morena visitas.       © Copyright [employer] 2021           back to top                          © Copyright [employer] 2021

## 2021-10-04 NOTE — ED PROVIDER NOTE - PROGRESS NOTE DETAILS
Patient resting comfortably, feels markedly improved. Now asymptomatic. Lungs CTAB. To f/u with PMD in 1-2 days. Return to the ED immediately if getting worse, not improving, or if having any new or troubling symptoms.

## 2022-05-31 ENCOUNTER — INPATIENT (INPATIENT)
Facility: HOSPITAL | Age: 72
LOS: 1 days | Discharge: HOME | DRG: 311 | End: 2022-06-02
Attending: INTERNAL MEDICINE | Admitting: INTERNAL MEDICINE
Payer: MEDICARE

## 2022-05-31 VITALS
RESPIRATION RATE: 18 BRPM | HEART RATE: 113 BPM | TEMPERATURE: 98 F | DIASTOLIC BLOOD PRESSURE: 84 MMHG | OXYGEN SATURATION: 97 % | SYSTOLIC BLOOD PRESSURE: 143 MMHG | WEIGHT: 155.21 LBS | HEIGHT: 65 IN

## 2022-05-31 DIAGNOSIS — Z29.9 ENCOUNTER FOR PROPHYLACTIC MEASURES, UNSPECIFIED: ICD-10-CM

## 2022-05-31 DIAGNOSIS — E78.5 HYPERLIPIDEMIA, UNSPECIFIED: ICD-10-CM

## 2022-05-31 DIAGNOSIS — Z90.710 ACQUIRED ABSENCE OF BOTH CERVIX AND UTERUS: Chronic | ICD-10-CM

## 2022-05-31 DIAGNOSIS — E87.1 HYPO-OSMOLALITY AND HYPONATREMIA: ICD-10-CM

## 2022-05-31 DIAGNOSIS — N39.0 URINARY TRACT INFECTION, SITE NOT SPECIFIED: ICD-10-CM

## 2022-05-31 DIAGNOSIS — I21.4 NON-ST ELEVATION (NSTEMI) MYOCARDIAL INFARCTION: ICD-10-CM

## 2022-05-31 DIAGNOSIS — I24.9 ACUTE ISCHEMIC HEART DISEASE, UNSPECIFIED: ICD-10-CM

## 2022-05-31 DIAGNOSIS — J45.909 UNSPECIFIED ASTHMA, UNCOMPLICATED: ICD-10-CM

## 2022-05-31 DIAGNOSIS — Z98.890 OTHER SPECIFIED POSTPROCEDURAL STATES: Chronic | ICD-10-CM

## 2022-05-31 DIAGNOSIS — I10 ESSENTIAL (PRIMARY) HYPERTENSION: ICD-10-CM

## 2022-05-31 LAB
ALBUMIN SERPL ELPH-MCNC: 3.6 G/DL — SIGNIFICANT CHANGE UP (ref 3.5–5)
ALP SERPL-CCNC: 61 U/L — SIGNIFICANT CHANGE UP (ref 40–120)
ALT FLD-CCNC: 39 U/L DA — SIGNIFICANT CHANGE UP (ref 10–60)
ANION GAP SERPL CALC-SCNC: 11 MMOL/L — SIGNIFICANT CHANGE UP (ref 5–17)
APPEARANCE UR: CLEAR — SIGNIFICANT CHANGE UP
AST SERPL-CCNC: 28 U/L — SIGNIFICANT CHANGE UP (ref 10–40)
BACTERIA # UR AUTO: ABNORMAL /HPF
BASOPHILS # BLD AUTO: 0.03 K/UL — SIGNIFICANT CHANGE UP (ref 0–0.2)
BASOPHILS NFR BLD AUTO: 0.3 % — SIGNIFICANT CHANGE UP (ref 0–2)
BILIRUB SERPL-MCNC: 1.5 MG/DL — HIGH (ref 0.2–1.2)
BILIRUB UR-MCNC: NEGATIVE — SIGNIFICANT CHANGE UP
BUN SERPL-MCNC: 14 MG/DL — SIGNIFICANT CHANGE UP (ref 7–18)
CALCIUM SERPL-MCNC: 9.4 MG/DL — SIGNIFICANT CHANGE UP (ref 8.4–10.5)
CHLORIDE SERPL-SCNC: 83 MMOL/L — LOW (ref 96–108)
CO2 SERPL-SCNC: 30 MMOL/L — SIGNIFICANT CHANGE UP (ref 22–31)
COLOR SPEC: YELLOW — SIGNIFICANT CHANGE UP
CREAT SERPL-MCNC: 1.05 MG/DL — SIGNIFICANT CHANGE UP (ref 0.5–1.3)
DIFF PNL FLD: ABNORMAL
EGFR: 56 ML/MIN/1.73M2 — LOW
EOSINOPHIL # BLD AUTO: 0.04 K/UL — SIGNIFICANT CHANGE UP (ref 0–0.5)
EOSINOPHIL NFR BLD AUTO: 0.4 % — SIGNIFICANT CHANGE UP (ref 0–6)
EPI CELLS # UR: ABNORMAL /HPF
GLUCOSE SERPL-MCNC: 97 MG/DL — SIGNIFICANT CHANGE UP (ref 70–99)
GLUCOSE UR QL: NEGATIVE — SIGNIFICANT CHANGE UP
HCT VFR BLD CALC: 42 % — SIGNIFICANT CHANGE UP (ref 34.5–45)
HGB BLD-MCNC: 15.5 G/DL — SIGNIFICANT CHANGE UP (ref 11.5–15.5)
IMM GRANULOCYTES NFR BLD AUTO: 0.4 % — SIGNIFICANT CHANGE UP (ref 0–1.5)
KETONES UR-MCNC: ABNORMAL
LEUKOCYTE ESTERASE UR-ACNC: ABNORMAL
LIDOCAIN IGE QN: 126 U/L — SIGNIFICANT CHANGE UP (ref 73–393)
LYMPHOCYTES # BLD AUTO: 1.58 K/UL — SIGNIFICANT CHANGE UP (ref 1–3.3)
LYMPHOCYTES # BLD AUTO: 17.8 % — SIGNIFICANT CHANGE UP (ref 13–44)
MAGNESIUM SERPL-MCNC: 1.8 MG/DL — SIGNIFICANT CHANGE UP (ref 1.6–2.6)
MCHC RBC-ENTMCNC: 31.4 PG — SIGNIFICANT CHANGE UP (ref 27–34)
MCHC RBC-ENTMCNC: 36.9 GM/DL — HIGH (ref 32–36)
MCV RBC AUTO: 85 FL — SIGNIFICANT CHANGE UP (ref 80–100)
MONOCYTES # BLD AUTO: 0.75 K/UL — SIGNIFICANT CHANGE UP (ref 0–0.9)
MONOCYTES NFR BLD AUTO: 8.4 % — SIGNIFICANT CHANGE UP (ref 2–14)
NEUTROPHILS # BLD AUTO: 6.45 K/UL — SIGNIFICANT CHANGE UP (ref 1.8–7.4)
NEUTROPHILS NFR BLD AUTO: 72.7 % — SIGNIFICANT CHANGE UP (ref 43–77)
NITRITE UR-MCNC: NEGATIVE — SIGNIFICANT CHANGE UP
NRBC # BLD: 0 /100 WBCS — SIGNIFICANT CHANGE UP (ref 0–0)
NT-PROBNP SERPL-SCNC: 126 PG/ML — HIGH (ref 0–125)
OSMOLALITY UR: 196 MOS/KG — SIGNIFICANT CHANGE UP (ref 50–1200)
PH UR: 6 — SIGNIFICANT CHANGE UP (ref 5–8)
PHOSPHATE SERPL-MCNC: 2.8 MG/DL — SIGNIFICANT CHANGE UP (ref 2.5–4.5)
PLATELET # BLD AUTO: 286 K/UL — SIGNIFICANT CHANGE UP (ref 150–400)
POTASSIUM SERPL-MCNC: 3 MMOL/L — LOW (ref 3.5–5.3)
POTASSIUM SERPL-SCNC: 3 MMOL/L — LOW (ref 3.5–5.3)
PROT SERPL-MCNC: 7.4 G/DL — SIGNIFICANT CHANGE UP (ref 6–8.3)
PROT UR-MCNC: 15
RAPID RVP RESULT: SIGNIFICANT CHANGE UP
RBC # BLD: 4.94 M/UL — SIGNIFICANT CHANGE UP (ref 3.8–5.2)
RBC # FLD: 11.5 % — SIGNIFICANT CHANGE UP (ref 10.3–14.5)
RBC CASTS # UR COMP ASSIST: ABNORMAL /HPF (ref 0–2)
SARS-COV-2 RNA SPEC QL NAA+PROBE: SIGNIFICANT CHANGE UP
SODIUM SERPL-SCNC: 124 MMOL/L — LOW (ref 135–145)
SODIUM UR-SCNC: 24 MMOL/L — SIGNIFICANT CHANGE UP
SP GR SPEC: 1.01 — SIGNIFICANT CHANGE UP (ref 1.01–1.02)
TROPONIN I, HIGH SENSITIVITY RESULT: 115.3 NG/L — HIGH
TROPONIN I, HIGH SENSITIVITY RESULT: 135.6 NG/L — HIGH
UROBILINOGEN FLD QL: NEGATIVE — SIGNIFICANT CHANGE UP
WBC # BLD: 8.89 K/UL — SIGNIFICANT CHANGE UP (ref 3.8–10.5)
WBC # FLD AUTO: 8.89 K/UL — SIGNIFICANT CHANGE UP (ref 3.8–10.5)
WBC UR QL: >50 /HPF (ref 0–5)

## 2022-05-31 PROCEDURE — 99285 EMERGENCY DEPT VISIT HI MDM: CPT

## 2022-05-31 RX ORDER — ALENDRONATE SODIUM 70 MG/1
1 TABLET ORAL
Qty: 0 | Refills: 0 | DISCHARGE

## 2022-05-31 RX ORDER — ASPIRIN/CALCIUM CARB/MAGNESIUM 324 MG
81 TABLET ORAL DAILY
Refills: 0 | Status: DISCONTINUED | OUTPATIENT
Start: 2022-05-31 | End: 2022-06-02

## 2022-05-31 RX ORDER — ATORVASTATIN CALCIUM 80 MG/1
1 TABLET, FILM COATED ORAL
Qty: 0 | Refills: 0 | DISCHARGE

## 2022-05-31 RX ORDER — ASPIRIN/CALCIUM CARB/MAGNESIUM 324 MG
1 TABLET ORAL
Qty: 0 | Refills: 0 | DISCHARGE

## 2022-05-31 RX ORDER — HEPARIN SODIUM 5000 [USP'U]/ML
5000 INJECTION INTRAVENOUS; SUBCUTANEOUS EVERY 8 HOURS
Refills: 0 | Status: DISCONTINUED | OUTPATIENT
Start: 2022-05-31 | End: 2022-06-02

## 2022-05-31 RX ORDER — CHOLECALCIFEROL (VITAMIN D3) 125 MCG
2000 CAPSULE ORAL DAILY
Refills: 0 | Status: DISCONTINUED | OUTPATIENT
Start: 2022-05-31 | End: 2022-06-02

## 2022-05-31 RX ORDER — AMLODIPINE BESYLATE 2.5 MG/1
2.5 TABLET ORAL DAILY
Refills: 0 | Status: DISCONTINUED | OUTPATIENT
Start: 2022-05-31 | End: 2022-06-01

## 2022-05-31 RX ORDER — SODIUM CHLORIDE 9 MG/ML
1000 INJECTION INTRAMUSCULAR; INTRAVENOUS; SUBCUTANEOUS ONCE
Refills: 0 | Status: COMPLETED | OUTPATIENT
Start: 2022-05-31 | End: 2022-05-31

## 2022-05-31 RX ORDER — BUDESONIDE AND FORMOTEROL FUMARATE DIHYDRATE 160; 4.5 UG/1; UG/1
2 AEROSOL RESPIRATORY (INHALATION)
Refills: 0 | Status: DISCONTINUED | OUTPATIENT
Start: 2022-05-31 | End: 2022-06-02

## 2022-05-31 RX ORDER — ALBUTEROL 90 UG/1
2 AEROSOL, METERED ORAL EVERY 6 HOURS
Refills: 0 | Status: DISCONTINUED | OUTPATIENT
Start: 2022-05-31 | End: 2022-06-02

## 2022-05-31 RX ORDER — ATORVASTATIN CALCIUM 80 MG/1
40 TABLET, FILM COATED ORAL AT BEDTIME
Refills: 0 | Status: DISCONTINUED | OUTPATIENT
Start: 2022-05-31 | End: 2022-06-02

## 2022-05-31 RX ORDER — VALSARTAN 80 MG/1
1 TABLET ORAL
Qty: 0 | Refills: 0 | DISCHARGE

## 2022-05-31 RX ORDER — METOPROLOL TARTRATE 50 MG
12.5 TABLET ORAL
Refills: 0 | Status: DISCONTINUED | OUTPATIENT
Start: 2022-05-31 | End: 2022-06-01

## 2022-05-31 RX ORDER — SODIUM CHLORIDE 9 MG/ML
1000 INJECTION INTRAMUSCULAR; INTRAVENOUS; SUBCUTANEOUS
Refills: 0 | Status: DISCONTINUED | OUTPATIENT
Start: 2022-05-31 | End: 2022-06-01

## 2022-05-31 RX ORDER — ASCORBIC ACID 60 MG
500 TABLET,CHEWABLE ORAL DAILY
Refills: 0 | Status: DISCONTINUED | OUTPATIENT
Start: 2022-05-31 | End: 2022-06-02

## 2022-05-31 RX ORDER — VALSARTAN 80 MG/1
320 TABLET ORAL DAILY
Refills: 0 | Status: DISCONTINUED | OUTPATIENT
Start: 2022-05-31 | End: 2022-06-02

## 2022-05-31 RX ORDER — ASPIRIN/CALCIUM CARB/MAGNESIUM 324 MG
324 TABLET ORAL ONCE
Refills: 0 | Status: COMPLETED | OUTPATIENT
Start: 2022-05-31 | End: 2022-05-31

## 2022-05-31 RX ORDER — POTASSIUM CHLORIDE 20 MEQ
40 PACKET (EA) ORAL EVERY 4 HOURS
Refills: 0 | Status: COMPLETED | OUTPATIENT
Start: 2022-05-31 | End: 2022-05-31

## 2022-05-31 RX ORDER — AMLODIPINE BESYLATE 2.5 MG/1
1 TABLET ORAL
Qty: 0 | Refills: 0 | DISCHARGE

## 2022-05-31 RX ORDER — CEFTRIAXONE 500 MG/1
1000 INJECTION, POWDER, FOR SOLUTION INTRAMUSCULAR; INTRAVENOUS ONCE
Refills: 0 | Status: COMPLETED | OUTPATIENT
Start: 2022-05-31 | End: 2022-05-31

## 2022-05-31 RX ORDER — CHLORTHALIDONE 50 MG
1 TABLET ORAL
Qty: 0 | Refills: 0 | DISCHARGE

## 2022-05-31 RX ORDER — FLUTICASONE FUROATE AND VILANTEROL TRIFENATATE 100; 25 UG/1; UG/1
1 POWDER RESPIRATORY (INHALATION)
Qty: 0 | Refills: 0 | DISCHARGE

## 2022-05-31 RX ORDER — LANOLIN ALCOHOL/MO/W.PET/CERES
3 CREAM (GRAM) TOPICAL AT BEDTIME
Refills: 0 | Status: DISCONTINUED | OUTPATIENT
Start: 2022-05-31 | End: 2022-06-02

## 2022-05-31 RX ADMIN — CEFTRIAXONE 100 MILLIGRAM(S): 500 INJECTION, POWDER, FOR SOLUTION INTRAMUSCULAR; INTRAVENOUS at 17:47

## 2022-05-31 RX ADMIN — Medication 3 MILLIGRAM(S): at 22:04

## 2022-05-31 RX ADMIN — HEPARIN SODIUM 5000 UNIT(S): 5000 INJECTION INTRAVENOUS; SUBCUTANEOUS at 22:04

## 2022-05-31 RX ADMIN — Medication 324 MILLIGRAM(S): at 17:47

## 2022-05-31 RX ADMIN — Medication 40 MILLIEQUIVALENT(S): at 20:13

## 2022-05-31 RX ADMIN — SODIUM CHLORIDE 1000 MILLILITER(S): 9 INJECTION INTRAMUSCULAR; INTRAVENOUS; SUBCUTANEOUS at 13:24

## 2022-05-31 RX ADMIN — BUDESONIDE AND FORMOTEROL FUMARATE DIHYDRATE 2 PUFF(S): 160; 4.5 AEROSOL RESPIRATORY (INHALATION) at 22:46

## 2022-05-31 RX ADMIN — Medication 12.5 MILLIGRAM(S): at 19:04

## 2022-05-31 RX ADMIN — SODIUM CHLORIDE 70 MILLILITER(S): 9 INJECTION INTRAMUSCULAR; INTRAVENOUS; SUBCUTANEOUS at 20:51

## 2022-05-31 RX ADMIN — ATORVASTATIN CALCIUM 40 MILLIGRAM(S): 80 TABLET, FILM COATED ORAL at 22:04

## 2022-05-31 NOTE — ED ADULT NURSE NOTE - NS ED NOTE ABUSE SUSPICION NEGLECT YN
CDU Discharge Summary        Patient:  Brenda Hudson  YOB: 1944    MRN: [id number]   Acct: [de-identified]    Primary Care Physician: Michaela Barillas MD    Admit date:  11/12/2020 11:58 AM  Discharge date: 11/13/2020  8:45 PM     Discharge Diagnoses:     Acute chest pain due to unknown etiology likely previous coronary artery disease  Improved with rest, reassurance, pain management, cardiology consult, and stress test    Follow-up:  Call today/tomorrow for a follow up appointment with Michaela Barillas MD , or return to the Emergency Room with worsening symptoms    Stressed to patient the importance of following up with primary care doctor for further workup/management of symptoms. Pt verbalizes understanding and agrees with plan.     Discharge Medications:  Changes to medications          Bhumi Bose   Lorenzo Medication Instructions LBA:[id number]    Printed on:11/14/20 0068   Medication Information                      acetaminophen (TYLENOL) 500 MG tablet  Take 1 tablet by mouth every 6 hours as needed for Pain             albuterol (PROVENTIL) (2.5 MG/3ML) 0.083% nebulizer solution  Take 1.5 mLs by nebulization 4 times daily             albuterol sulfate  (90 Base) MCG/ACT inhaler  Inhale 2 puffs into the lungs every 6 hours as needed for Wheezing             apixaban (ELIQUIS) 5 MG TABS tablet  Take 5 mg by mouth 2 times daily             Capsaicin 0.025 % PADS  Apply to area of pain             citalopram (CELEXA) 20 MG tablet  TAKE 1 TABLET BY MOUTH DAILY             DALIRESP 500 MCG tablet  TAKE 1 TABLET BY MOUTH ONCE DAILY AS DIRECTED             diphenhydrAMINE (BENADRYL) 25 MG capsule  Take 25 mg by mouth every 4 hours as needed (for restless muscle symptoms)             fluticasone-vilanterol (BREO ELLIPTA) 100-25 MCG/INH AEPB inhaler  INHALE 1 PUFF BY MOUTH INTO THE LUNGS ONCE DAILY AS DIRECTED (DISCONTINUE ADVAIR INHALER)             isosorbide mononitrate (IMDUR) 60 MG extended release Result Value Ref Range    WBC 4.6 3.5 - 11.3 k/uL    RBC 4.85 4.21 - 5.77 m/uL    Hemoglobin 12.2 (L) 13.0 - 17.0 g/dL    Hematocrit 40.4 (L) 40.7 - 50.3 %    MCV 83.3 82.6 - 102.9 fL    MCH 25.2 25.2 - 33.5 pg    MCHC 30.2 28.4 - 34.8 g/dL    RDW 15.2 (H) 11.8 - 14.4 %    Platelets 925 919 - 488 k/uL    MPV 10.8 8.1 - 13.5 fL    NRBC Automated 0.0 0.0 per 100 WBC    Differential Type NOT REPORTED     Seg Neutrophils 46 36 - 65 %    Lymphocytes 33 24 - 43 %    Monocytes 13 (H) 3 - 12 %    Eosinophils % 6 (H) 1 - 4 %    Basophils 1 0 - 2 %    Immature Granulocytes 1 (H) 0 %    Segs Absolute 2.14 1.50 - 8.10 k/uL    Absolute Lymph # 1.54 1.10 - 3.70 k/uL    Absolute Mono # 0.59 0.10 - 1.20 k/uL    Absolute Eos # 0.27 0.00 - 0.44 k/uL    Basophils Absolute 0.04 0.00 - 0.20 k/uL    Absolute Immature Granulocyte 0.03 0.00 - 0.30 k/uL    WBC Morphology NOT REPORTED     RBC Morphology ANISOCYTOSIS PRESENT     Platelet Estimate NOT REPORTED    Troponin   Result Value Ref Range    Troponin, High Sensitivity 8 0 - 22 ng/L    Troponin T NOT REPORTED <0.03 ng/mL    Troponin Interp NOT REPORTED    EKG 12 Lead   Result Value Ref Range    Ventricular Rate 69 BPM    Atrial Rate 69 BPM    P-R Interval 154 ms    QRS Duration 102 ms    Q-T Interval 412 ms    QTc Calculation (Bazett) 441 ms    R Axis 85 degrees    T Axis 67 degrees   EKG 12 Lead   Result Value Ref Range    Ventricular Rate 68 BPM    Atrial Rate 68 BPM    P-R Interval 174 ms    QRS Duration 106 ms    Q-T Interval 414 ms    QTc Calculation (Bazett) 440 ms    P Axis 80 degrees    R Axis 80 degrees    T Axis 67 degrees   EKG 12 Lead   Result Value Ref Range    Ventricular Rate 66 BPM    Atrial Rate 66 BPM    P-R Interval 158 ms    QRS Duration 104 ms    Q-T Interval 440 ms    QTc Calculation (Bazett) 461 ms    P Axis 16 degrees    R Axis 78 degrees    T Axis 68 degrees     Xr Chest (2 Vw)    Result Date: 11/12/2020  EXAMINATION: TWO XRAY VIEWS OF THE CHEST 11/12/2020 1:03 pm COMPARISON: 19 December 2019 HISTORY: ORDERING SYSTEM PROVIDED HISTORY: chest pain, SOB TECHNOLOGIST PROVIDED HISTORY: chest pain, SOB Reason for Exam: chest pain, SOB FINDINGS: AP and lateral views of the chest demonstrates stable postsurgical changes of prior CABG. Atherosclerotic vascular calcifications are visualized. Cardiac size and pulmonary vascularity are within normal limits. Small amount of scarring is seen within the right costophrenic angle. No focal infiltrates, effusions or pneumothoraces. Again seen is mild wedging of the midthoracic vertebrae, unchanged. Inferior vena cava filter is also noted. No radiologic evidence of acute cardiopulmonary disease. Nm Cardiac Stress Test Nuclear Imaging    Result Date: 11/13/2020  EXAMINATION: MYOCARDIAL PERFUSION IMAGING 11/13/2020 12:15 pm TECHNIQUE: For the rest study, 45 mCi of Tc 99 labeled sestamibi were injected. SPECT images with ECG gating were acquired. Under cardiology supervision, 0.4mg [place] was infused. After pharmacologic stress, 13.2 mCi of Tc 99 labeled sestamibi were injected. SPECT images were acquired. COMPARISON: None Available. HISTORY: ORDERING SYSTEM PROVIDED HISTORY: Chest pain TECHNOLOGIST PROVIDED HISTORY: Reason for Exam: Chest pain Procedure Type->Rx chest pain Reason for Exam: chest pain, S/P CABG x 1 , Asthma, Atrial fibrillation , COPD, DM, HTN,  Chronic diastolic congestive heart failure , FINDINGS: Images interpreted utilizing [employer] and [employer]. Small fixed moderate to severe apical inferior wall perfusion defect which is more pronounced on the rest images. This defect persists on prone imaging. Gated images show no significant wall motion abnormalities. Findings attributed to diaphragmatic attenuation artifact. No reversible defect to suggest ischemia. Perfusion scores are visually adjusted to account for artifact.  Summed stress score:  1 Summed rest score:  0 Summed reversibility score:  0 Function: End diastolic volume:  56AJ Left ventricular ejection fraction:  56% TID score:  1.10 (scores greater than 1.39 are considered elevated for Lexiscan stress with Tc99m) Notes concerning risk stratification: Risk stratification incorporates both clinical history and some testing results. Final risk determination is the responsibility of the ordering provider as other patient information and test results may increase or decrease the risk assessment reported for this examination. Risk stratification criteria are adapted from \"Noninvasive Risk Stratification\" criteria from [employer] Sykes. Al, ACC/AATS/AHA/ASE/ASNC/SCAI/SCCT/STS 2017 Appropriate Use Criteria For Coronary Revascularization in Patients With Stable Ischemic Heart Disease [facility] Volume 69, Issue 17, May 2017 High risk (>3% annual death or MI) 1. Severe resting LV dysfunction (LVEF <35%) not readily explained by non coronary causes 2. Resting perfusion abnormalities greater than 10% of the myocardium in patients without prior history or evidence of MI 3. Stress-induced perfusion abnormalities encumbering greater than or equal to 10% myocardium or stress segmental scores indicating multiple vascular territories with abnormalities 4. Stress-induced LV dilatation (TID ratio greater than 1.19 for exercise and greater than 1.39 for regadenoson) Intermediate risk (1% to 3% annual death or MI) 1. Mild/moderate resting LV dysfunction (LVEF 35% to 49%) not readily explained by non coronary causes. 2. Resting perfusion abnormalities in 5%-9.9% of the myocardium in patients without a history or prior evidence of MI 3. Stress-induced perfusion abnormality encumbering 5%-9.9% of the myocardium or stress segmental scores indicating 1 vascular territory with abnormalities but without LV dilation 4. Small wall motion abnormality involving 1-2 segments and only 1 coronary bed. Low Risk (Less than 1% annual death or MI) 1.  Normal or small myocardial perfusion defect at rest or with stress encumbering less than 5% of the myocardium. No stress-induced ischemia. Normal LVEF. Risk stratification: Low           Physical Exam:    General appearance - NAD, AOx 3   Lungs -CTAB, no R/R/R  Heart - RRR, no M/R/G  Abdomen - Soft, NT/ND  Neurological:  MAEx4, No focal motor deficit, sensory loss  Extremities - Cap refil <2 sec in all ext., no edema  Skin -warm, dry      Hospital Course:  Clinical course has improved, labs and imaging reviewed. Bart Whitney originally presented to the hospital on 11/12/2020 11:58 AM. with acute onset of chest pain. .  At that time it was determined that He required further observation and vital sign monitoring, pain control, rest, reassurance, cardiology evaluation, stress test.  Patient stress test was noted to be low risk negative for ischemia. Patient was discharged to facility in stable condition. He was admitted and labs and imaging were followed daily. Imaging results as above. He is medically stable to be discharged. Disposition: Home    Patient stated that they will not drive themselves home from the hospital if they have gotten pain killers/ narcotics earlier that day and that they will arrange for transportation on their own or work with the  for a ride. Patient counseled NOT to drive while under the influence of narcotics/ pain killers. Condition: Good    Patient stable and ready for discharge home. I have discussed plan of care with patient and they are in understanding. They were instructed to read discharge paperwork. All of their questions and concerns were addressed. Time Spent: 1 day      --  Jones Ruffin DO  Emergency Medicine Resident Physician    This dictation was generated by voice recognition computer software. Although all attempts are made to edit the dictation for accuracy, there may be errors in the transcription that are not intended. No

## 2022-05-31 NOTE — ED PROVIDER NOTE - CLINICAL SUMMARY MEDICAL DECISION MAKING FREE TEXT BOX
71F presenting with dizziness and vomiting. no chest pain or shortness of breath. first time elevated trop. will admit.

## 2022-05-31 NOTE — ED PROVIDER NOTE - PROGRESS NOTE DETAILS
patient updated on results. symptoms improved. when asked, now reports chest pain yesterday but no active chest pain. will admit. Daniel Jacobo

## 2022-05-31 NOTE — H&P ADULT - ASSESSMENT
Patient is 71 years old female with past medical history of HTN, HLD, Asthma, CAD s/p cath showed RCA 30% Stenosis who was brought to ED due to left side chest pain that started on Saturday. Patient states that she started feeling chest tightness, 4/10 with no alleviating factors. During Exam, Patient was lying comfortably in bed mentioned that her chest pain is getting better. EKG showed NSR. Troponin is mild elevated. Na is 124. Patient will be admitted to tele for further monitoring.

## 2022-05-31 NOTE — H&P ADULT - PROBLEM SELECTOR PLAN 1
- Patient presented with left side chest pain started on saturday   - EKG showed NSR   - Troponin is mild elevated >100  - Will admit the patient to tele   - Start ASA, Statin and low dose lopressor   - Repeat troponin x2  - Echo f/u  - Cardiology Dr. Mcneil

## 2022-05-31 NOTE — ED PROVIDER NOTE - CARE PLAN
1 Principal Discharge DX:	NSTEMI (non-ST elevation myocardial infarction)  Secondary Diagnosis:	Dizziness

## 2022-05-31 NOTE — H&P ADULT - ATTENDING COMMENTS
71 years old female with past medical history of HTN, HLD, Asthma, CAD s/p cath showed RCA 30% Stenosis who was brought to ED due to left side chest pain that started on Saturday. Patient states that she started feeling chest tightness, 4/10 with no alleviating factors. During Exam, Patient was lying comfortably in bed mentioned that her chest pain is getting better. EKG showed NSR. Troponin is mild elevated. Patient saw her cardiologist in last few weeks who told her she has low heart function. Patient denied any shortness of breath, N/V or abdominal pain or change in bowel movement.        assessment   --- chest pain,  r/o acs, uti, hyponatremia, hypokalemia, h/o HTN, HLD, Asthma, CAD s/p cath showed RCA 30% Stenosis    plan  --  adm to tele, acs protocol, lopressor, aspirin, statin, rocephin, ivf, cont preadmit home meds, gi and dvt prophylaxis  cbc, bmp, mg, phos, lipid, tsh, ce q8 x3, ucx, urine legionella     echo    cardio cons  pulm cons

## 2022-05-31 NOTE — PATIENT PROFILE ADULT - FALL HARM RISK - UNIVERSAL INTERVENTIONS
Bed in lowest position, wheels locked, appropriate side rails in place/Call bell, personal items and telephone in reach/Instruct patient to call for assistance before getting out of bed or chair/Non-slip footwear when patient is out of bed/[place] to call system/Physically safe environment - no spills, clutter or unnecessary equipment/Purposeful Proactive Rounding/Room/bathroom lighting operational, light cord in reach

## 2022-05-31 NOTE — H&P ADULT - PROBLEM/PLAN-2
DISPLAY PLAN FREE TEXT Surgeon/Pathologist Verbiage (Will Incorporate Name Of Surgeon From Intro If Not Blank): operated in two distinct and integrated capacities as the surgeon and pathologist.

## 2022-05-31 NOTE — ED PROVIDER NOTE - PHYSICAL EXAMINATION
General: well appearing female, no acute distress   HEENT: normocephalic, atraumatic, EOMI  Respiratory: normal work of breathing, lungs clear to auscultation bilaterally   Cardiac: regular rate and rhythm   Abdomen: soft, non-tender, no guarding or rebound   MSK: no swelling or tenderness of lower extremities, moving all extremities spontaneously   Skin: warm, dry   Neuro: A&Ox3, cranial nerves II-XII intact, 5/5 strength in all extremities, no sensory deficits   Psych: appropriate affect

## 2022-05-31 NOTE — H&P ADULT - NSHPPHYSICALEXAM_GEN_ALL_CORE
Vital Signs Last 24 Hrs  T(C): 36.6 (31 May 2022 16:05), Max: 36.8 (31 May 2022 11:46)  T(F): 97.9 (31 May 2022 16:05), Max: 98.2 (31 May 2022 11:46)  HR: 77 (31 May 2022 16:05) (77 - 113)  BP: 131/79 (31 May 2022 16:05) (131/79 - 143/84)  RR: 18 (31 May 2022 16:05) (18 - 18)  SpO2: 95% (31 May 2022 16:05) (95% - 97%)    GENERAL: NAD, lying in bed comfortably, AAOx3  HEAD:  Atraumatic, Normocephalic  EYES: EOMI, PERRLA, conjunctiva and sclera clear  ENT: Moist mucous membranes  NECK: Supple, No JVD  CHEST/LUNG: Left side chest pain   HEART: Regular rate and rhythm; No murmurs, rubs, or gallops  ABDOMEN: Bowel sounds present; Soft, Nontender, Nondistended. No hepatomegally  EXTREMITIES:  2+ Peripheral Pulses, brisk capillary refill. No clubbing, cyanosis, or edema  NERVOUS SYSTEM:  Alert & Oriented X3, speech clear. No deficits   MSK: FROM all 4 extremities, full and equal strength  SKIN: No rashes or lesions

## 2022-05-31 NOTE — ED ADULT NURSE NOTE - TEMPLATE
CM follow up:    Patient has been declined by [facility], message sent to [facility], await response. Patient currently requiring medical management including IV iron, potassium and magnesium replete.     Chandrakant Toledo RN, MSN/Care manager  [phone] Communicable/Infectious

## 2022-05-31 NOTE — H&P ADULT - HISTORY OF PRESENT ILLNESS
Patient is 71 years old female with past medical history of HTN, HLD, Asthma, CAD s/p cath showed RCA 30% Stenosis who was brought to ED due to left side chest pain that started on Saturday. Patient states that she started feeling chest tightness, 4/10 with no alleviating factors. During Exam, Patient was lying comfortably in bed mentioned that her chest pain is getting better. EKG showed NSR. Troponin is mild elevated. Patient saw her cardiologist in last few weeks who told her she has low heart function. Patient denied any shortness of breath, N/V or abdominal pain or change in bowel movement.

## 2022-05-31 NOTE — H&P ADULT - NSHPREVIEWOFSYSTEMS_GEN_ALL_CORE
REVIEW OF SYSTEMS:  CONSTITUTIONAL: No weakness, fevers or chills  EYES/ENT: No visual changes;  No vertigo or throat pain   RESPIRATORY: No cough, wheezing, hemoptysis; No shortness of breath  CARDIOVASCULAR: Chest pain   GASTROINTESTINAL: No abdominal  pain. No nausea, vomiting. No diarrhea or constipation. No melena or hematochezia.  GENITOURINARY: No dysuria, frequency or hematuria  NEUROLOGICAL: No numbness or weakness  SKIN: No itching, rashes

## 2022-06-01 LAB
ALBUMIN SERPL ELPH-MCNC: 3.3 G/DL — LOW (ref 3.5–5)
ALP SERPL-CCNC: 57 U/L — SIGNIFICANT CHANGE UP (ref 40–120)
ALT FLD-CCNC: 37 U/L DA — SIGNIFICANT CHANGE UP (ref 10–60)
ANION GAP SERPL CALC-SCNC: 8 MMOL/L — SIGNIFICANT CHANGE UP (ref 5–17)
ANION GAP SERPL CALC-SCNC: 8 MMOL/L — SIGNIFICANT CHANGE UP (ref 5–17)
AST SERPL-CCNC: 30 U/L — SIGNIFICANT CHANGE UP (ref 10–40)
BASOPHILS # BLD AUTO: 0.03 K/UL — SIGNIFICANT CHANGE UP (ref 0–0.2)
BASOPHILS NFR BLD AUTO: 0.4 % — SIGNIFICANT CHANGE UP (ref 0–2)
BILIRUB SERPL-MCNC: 1.2 MG/DL — SIGNIFICANT CHANGE UP (ref 0.2–1.2)
BUN SERPL-MCNC: 15 MG/DL — SIGNIFICANT CHANGE UP (ref 7–18)
BUN SERPL-MCNC: 15 MG/DL — SIGNIFICANT CHANGE UP (ref 7–18)
CALCIUM SERPL-MCNC: 9 MG/DL — SIGNIFICANT CHANGE UP (ref 8.4–10.5)
CALCIUM SERPL-MCNC: 9 MG/DL — SIGNIFICANT CHANGE UP (ref 8.4–10.5)
CHLORIDE SERPL-SCNC: 93 MMOL/L — LOW (ref 96–108)
CHLORIDE SERPL-SCNC: 95 MMOL/L — LOW (ref 96–108)
CO2 SERPL-SCNC: 29 MMOL/L — SIGNIFICANT CHANGE UP (ref 22–31)
CO2 SERPL-SCNC: 29 MMOL/L — SIGNIFICANT CHANGE UP (ref 22–31)
CREAT SERPL-MCNC: 1.04 MG/DL — SIGNIFICANT CHANGE UP (ref 0.5–1.3)
CREAT SERPL-MCNC: 1.12 MG/DL — SIGNIFICANT CHANGE UP (ref 0.5–1.3)
D DIMER BLD IA.RAPID-MCNC: 167 NG/ML DDU — SIGNIFICANT CHANGE UP
EGFR: 52 ML/MIN/1.73M2 — LOW
EGFR: 57 ML/MIN/1.73M2 — LOW
EOSINOPHIL # BLD AUTO: 0.18 K/UL — SIGNIFICANT CHANGE UP (ref 0–0.5)
EOSINOPHIL NFR BLD AUTO: 2.3 % — SIGNIFICANT CHANGE UP (ref 0–6)
GLUCOSE SERPL-MCNC: 136 MG/DL — HIGH (ref 70–99)
GLUCOSE SERPL-MCNC: 96 MG/DL — SIGNIFICANT CHANGE UP (ref 70–99)
HCT VFR BLD CALC: 41.1 % — SIGNIFICANT CHANGE UP (ref 34.5–45)
HGB BLD-MCNC: 14.8 G/DL — SIGNIFICANT CHANGE UP (ref 11.5–15.5)
IMM GRANULOCYTES NFR BLD AUTO: 0.4 % — SIGNIFICANT CHANGE UP (ref 0–1.5)
LYMPHOCYTES # BLD AUTO: 3.11 K/UL — SIGNIFICANT CHANGE UP (ref 1–3.3)
LYMPHOCYTES # BLD AUTO: 39.8 % — SIGNIFICANT CHANGE UP (ref 13–44)
MAGNESIUM SERPL-MCNC: 2.1 MG/DL — SIGNIFICANT CHANGE UP (ref 1.6–2.6)
MCHC RBC-ENTMCNC: 31.8 PG — SIGNIFICANT CHANGE UP (ref 27–34)
MCHC RBC-ENTMCNC: 36 GM/DL — SIGNIFICANT CHANGE UP (ref 32–36)
MCV RBC AUTO: 88.2 FL — SIGNIFICANT CHANGE UP (ref 80–100)
MONOCYTES # BLD AUTO: 0.72 K/UL — SIGNIFICANT CHANGE UP (ref 0–0.9)
MONOCYTES NFR BLD AUTO: 9.2 % — SIGNIFICANT CHANGE UP (ref 2–14)
NEUTROPHILS # BLD AUTO: 3.75 K/UL — SIGNIFICANT CHANGE UP (ref 1.8–7.4)
NEUTROPHILS NFR BLD AUTO: 47.9 % — SIGNIFICANT CHANGE UP (ref 43–77)
NRBC # BLD: 0 /100 WBCS — SIGNIFICANT CHANGE UP (ref 0–0)
PHOSPHATE SERPL-MCNC: 2.5 MG/DL — SIGNIFICANT CHANGE UP (ref 2.5–4.5)
PLATELET # BLD AUTO: 295 K/UL — SIGNIFICANT CHANGE UP (ref 150–400)
POTASSIUM SERPL-MCNC: 3.1 MMOL/L — LOW (ref 3.5–5.3)
POTASSIUM SERPL-MCNC: 3.4 MMOL/L — LOW (ref 3.5–5.3)
POTASSIUM SERPL-SCNC: 3.1 MMOL/L — LOW (ref 3.5–5.3)
POTASSIUM SERPL-SCNC: 3.4 MMOL/L — LOW (ref 3.5–5.3)
PROT SERPL-MCNC: 7.2 G/DL — SIGNIFICANT CHANGE UP (ref 6–8.3)
RBC # BLD: 4.66 M/UL — SIGNIFICANT CHANGE UP (ref 3.8–5.2)
RBC # FLD: 11.6 % — SIGNIFICANT CHANGE UP (ref 10.3–14.5)
SODIUM SERPL-SCNC: 130 MMOL/L — LOW (ref 135–145)
SODIUM SERPL-SCNC: 132 MMOL/L — LOW (ref 135–145)
TROPONIN I, HIGH SENSITIVITY RESULT: 82.6 NG/L — HIGH
WBC # BLD: 7.82 K/UL — SIGNIFICANT CHANGE UP (ref 3.8–10.5)
WBC # FLD AUTO: 7.82 K/UL — SIGNIFICANT CHANGE UP (ref 3.8–10.5)

## 2022-06-01 RX ORDER — CEFTRIAXONE 500 MG/1
1000 INJECTION, POWDER, FOR SOLUTION INTRAMUSCULAR; INTRAVENOUS EVERY 24 HOURS
Refills: 0 | Status: DISCONTINUED | OUTPATIENT
Start: 2022-06-01 | End: 2022-06-02

## 2022-06-01 RX ORDER — AMLODIPINE BESYLATE 2.5 MG/1
2.5 TABLET ORAL DAILY
Refills: 0 | Status: DISCONTINUED | OUTPATIENT
Start: 2022-06-02 | End: 2022-06-02

## 2022-06-01 RX ORDER — METOPROLOL TARTRATE 50 MG
12.5 TABLET ORAL
Refills: 0 | Status: DISCONTINUED | OUTPATIENT
Start: 2022-06-01 | End: 2022-06-02

## 2022-06-01 RX ADMIN — BUDESONIDE AND FORMOTEROL FUMARATE DIHYDRATE 2 PUFF(S): 160; 4.5 AEROSOL RESPIRATORY (INHALATION) at 21:22

## 2022-06-01 RX ADMIN — ATORVASTATIN CALCIUM 40 MILLIGRAM(S): 80 TABLET, FILM COATED ORAL at 21:21

## 2022-06-01 RX ADMIN — HEPARIN SODIUM 5000 UNIT(S): 5000 INJECTION INTRAVENOUS; SUBCUTANEOUS at 14:58

## 2022-06-01 RX ADMIN — Medication 12.5 MILLIGRAM(S): at 17:26

## 2022-06-01 RX ADMIN — VALSARTAN 320 MILLIGRAM(S): 80 TABLET ORAL at 05:27

## 2022-06-01 RX ADMIN — Medication 40 MILLIEQUIVALENT(S): at 00:15

## 2022-06-01 RX ADMIN — AMLODIPINE BESYLATE 2.5 MILLIGRAM(S): 2.5 TABLET ORAL at 05:27

## 2022-06-01 RX ADMIN — HEPARIN SODIUM 5000 UNIT(S): 5000 INJECTION INTRAVENOUS; SUBCUTANEOUS at 05:28

## 2022-06-01 RX ADMIN — Medication 12.5 MILLIGRAM(S): at 05:27

## 2022-06-01 RX ADMIN — CEFTRIAXONE 100 MILLIGRAM(S): 500 INJECTION, POWDER, FOR SOLUTION INTRAMUSCULAR; INTRAVENOUS at 09:43

## 2022-06-01 RX ADMIN — Medication 2000 UNIT(S): at 11:27

## 2022-06-01 RX ADMIN — Medication 81 MILLIGRAM(S): at 11:28

## 2022-06-01 RX ADMIN — Medication 3 MILLIGRAM(S): at 21:21

## 2022-06-01 RX ADMIN — HEPARIN SODIUM 5000 UNIT(S): 5000 INJECTION INTRAVENOUS; SUBCUTANEOUS at 21:21

## 2022-06-01 RX ADMIN — BUDESONIDE AND FORMOTEROL FUMARATE DIHYDRATE 2 PUFF(S): 160; 4.5 AEROSOL RESPIRATORY (INHALATION) at 10:59

## 2022-06-01 RX ADMIN — Medication 500 MILLIGRAM(S): at 11:27

## 2022-06-01 NOTE — PROGRESS NOTE ADULT - SUBJECTIVE AND OBJECTIVE BOX
Patient is a 71y old  Female who presents with a chief complaint of ACS (01 Jun 2022 10:04)    pt seen in icu [  ], reg med floor [   ], bed [  ], chair at bedside [   ], a+o x3 [  ], lethargic [  ],  nad [  ]    torres [  ], ngt [  ], peg [  ], et tube [  ], cent line [  ], picc line [  ]        Allergies    No Known Allergies        Vitals    T(F): 97.8 (06-01-22 @ 07:55), Max: 98.6 (05-31-22 @ 19:10)  HR: 53 (06-01-22 @ 07:55) (53 - 113)  BP: 140/70 (06-01-22 @ 07:55) (131/77 - 153/73)  RR: 17 (06-01-22 @ 07:55) (17 - 18)  SpO2: 96% (06-01-22 @ 07:55) (95% - 100%)  Wt(kg): --  CAPILLARY BLOOD GLUCOSE      POCT Blood Glucose.: 127 mg/dL (31 May 2022 11:50)      Labs                          14.8   7.82  )-----------( 295      ( 01 Jun 2022 07:03 )             41.1       06-01    132<L>  |  95<L>  |  15  ----------------------------<  96  3.4<L>   |  29  |  1.04    Ca    9.0      01 Jun 2022 07:03  Phos  2.5     06-01  Mg     2.1     06-01    TPro  7.2  /  Alb  3.3<L>  /  TBili  1.2  /  DBili  x   /  AST  30  /  ALT  37  /  AlkPhos  57  06-01          Troponin I, High Sensitivity Result: 82.6 ng/L (06-01-22 @ 00:14)  Troponin I, High Sensitivity Result: 135.6 ng/L (05-31-22 @ 19:52)  Troponin I, High Sensitivity Result: 115.3 ng/L (05-31-22 @ 13:32)        Radiology Results      Meds    MEDICATIONS  (STANDING):  ascorbic acid 500 milliGRAM(s) Oral daily  aspirin enteric coated 81 milliGRAM(s) Oral daily  atorvastatin 40 milliGRAM(s) Oral at bedtime  budesonide 160 MICROgram(s)/formoterol 4.5 MICROgram(s) Inhaler 2 Puff(s) Inhalation two times a day  cefTRIAXone   IVPB 1000 milliGRAM(s) IV Intermittent every 24 hours  cholecalciferol 2000 Unit(s) Oral daily  heparin   Injectable 5000 Unit(s) SubCutaneous every 8 hours  melatonin 3 milliGRAM(s) Oral at bedtime  metoprolol tartrate 12.5 milliGRAM(s) Oral two times a day  valsartan 320 milliGRAM(s) Oral daily      MEDICATIONS  (PRN):  ALBUTerol    90 MICROgram(s) HFA Inhaler 2 Puff(s) Inhalation every 6 hours PRN Shortness of Breath and/or Wheezing      Physical Exam    Neuro :  no focal deficits  Respiratory: CTA B/L  CV: RRR, S1S2, no murmurs,   Abdominal: Soft, NT, ND +BS,  Extremities: No edema, + peripheral pulses    ASSESSMENT    Non-ST elevation myocardial infarction (NSTEMI)    HTN (hypertension)    HLD (hyperlipidemia)    Asthma    No significant past surgical history    S/P total hysterectomy    H/O shoulder surgery    H/O knee surgery        PLAN     Patient is a 71y old  Female who presents with a chief complaint of ACS (01 Jun 2022 10:04)    pt seen in icu [  ], reg med floor [ x  ], bed [  ], chair at bedside [ x  ], a+o x3 [ x ], lethargic [  ],  nad [x ]      Allergies    No Known Allergies        Vitals    T(F): 97.8 (06-01-22 @ 07:55), Max: 98.6 (05-31-22 @ 19:10)  HR: 53 (06-01-22 @ 07:55) (53 - 113)  BP: 140/70 (06-01-22 @ 07:55) (131/77 - 153/73)  RR: 17 (06-01-22 @ 07:55) (17 - 18)  SpO2: 96% (06-01-22 @ 07:55) (95% - 100%)  Wt(kg): --  CAPILLARY BLOOD GLUCOSE      POCT Blood Glucose.: 127 mg/dL (31 May 2022 11:50)      Labs                          14.8   7.82  )-----------( 295      ( 01 Jun 2022 07:03 )             41.1       06-01    132<L>  |  95<L>  |  15  ----------------------------<  96  3.4<L>   |  29  |  1.04    Ca    9.0      01 Jun 2022 07:03  Phos  2.5     06-01  Mg     2.1     06-01    TPro  7.2  /  Alb  3.3<L>  /  TBili  1.2  /  DBili  x   /  AST  30  /  ALT  37  /  AlkPhos  57  06-01    D-Dimer Assay, Quantitative (06.01.22 @ 14:52)   D-Dimer Assay, Quantitative: 167      Troponin I, High Sensitivity Result: 82.6 ng/L (06-01-22 @ 00:14)  Troponin I, High Sensitivity Result: 135.6 ng/L (05-31-22 @ 19:52)  Troponin I, High Sensitivity Result: 115.3 ng/L (05-31-22 @ 13:32)        Radiology Results      Meds    MEDICATIONS  (STANDING):  ascorbic acid 500 milliGRAM(s) Oral daily  aspirin enteric coated 81 milliGRAM(s) Oral daily  atorvastatin 40 milliGRAM(s) Oral at bedtime  budesonide 160 MICROgram(s)/formoterol 4.5 MICROgram(s) Inhaler 2 Puff(s) Inhalation two times a day  cefTRIAXone   IVPB 1000 milliGRAM(s) IV Intermittent every 24 hours  cholecalciferol 2000 Unit(s) Oral daily  heparin   Injectable 5000 Unit(s) SubCutaneous every 8 hours  melatonin 3 milliGRAM(s) Oral at bedtime  metoprolol tartrate 12.5 milliGRAM(s) Oral two times a day  valsartan 320 milliGRAM(s) Oral daily      MEDICATIONS  (PRN):  ALBUTerol    90 MICROgram(s) HFA Inhaler 2 Puff(s) Inhalation every 6 hours PRN Shortness of Breath and/or Wheezing      Physical Exam    Neuro :  no focal deficits  Respiratory: CTA B/L  CV: RRR, S1S2, no murmurs,   Abdominal: Soft, NT, ND +BS,  Extremities: No edema, + peripheral pulses    ASSESSMENT    Non-ST elevation myocardial infarction (NSTEMI)  chest pain,    r/o acs,   uti,   hyponatremia,   hypokalemia,   h/o HTN (hypertension)  HLD (hyperlipidemia)  Asthma  CAD s/p cath showed RCA 30% Stenosis  S/P total hysterectomy  shoulder surgery  knee surgery      PLAN    cont tele,   trop x3 positive noted above  acs protocol,   lopressor, aspirin, statin,   cardio cons noted  f/u echo   D Dimmer neg noted above  stress in AM  cont rocephin, ivf,   f/u ucx   pulm cons    Bronchodilators prn  Symbicort HFA  PFTs as OP.  cont current meds

## 2022-06-01 NOTE — CHART NOTE - NSCHARTNOTEFT_GEN_A_CORE
signed-out new admission to follow repeat trop and BMP at 12AM.  Trop #3 noted to down 113>135> 82. However potassium remains low 3.1 s/p KCl powder 40 meq x1 dose.  Of note repeat BMP drawn early, only finished 1 KCl powder dose, scheduled to get her second dose now.  Will continue to monitor in the AM and adjust electrolytes as needed.

## 2022-06-01 NOTE — CONSULT NOTE ADULT - SUBJECTIVE AND OBJECTIVE BOX
PULMONARY CONSULT NOTE      KIERAN ALONZO  [id number]    History of Present Illness:  Reason for Admission: ACS  History of Present Illness:   Patient is 71 years old female with past medical history of HTN, HLD, Asthma, CAD s/p cath showed RCA 30% Stenosis who was brought to ED due to left side chest pain that started on Saturday. Patient states that she started feeling chest tightness, 4/10 with no alleviating factors. During Exam, Patient was lying comfortably in bed mentioned that her chest pain is getting better. EKG showed NSR. Troponin is mild elevated. Patient saw her cardiologist in last few weeks who told her she has low heart function. Patient denied any shortness of breath, N/V or abdominal pain or change in bowel movement.    HISTORY OF PRESENT ILLNESS: As above. Patient is awake, alert, lying comfortably in bed in no acute distress. She is feeling well breathing well on room air. Cough+.    MEDICATIONS  (STANDING):  ascorbic acid 500 milliGRAM(s) Oral daily  aspirin enteric coated 81 milliGRAM(s) Oral daily  atorvastatin 40 milliGRAM(s) Oral at bedtime  budesonide 160 MICROgram(s)/formoterol 4.5 MICROgram(s) Inhaler 2 Puff(s) Inhalation two times a day  cefTRIAXone   IVPB 1000 milliGRAM(s) IV Intermittent every 24 hours  cholecalciferol 2000 Unit(s) Oral daily  heparin   Injectable 5000 Unit(s) SubCutaneous every 8 hours  melatonin 3 milliGRAM(s) Oral at bedtime  metoprolol tartrate 12.5 milliGRAM(s) Oral two times a day  valsartan 320 milliGRAM(s) Oral daily      MEDICATIONS  (PRN):  ALBUTerol    90 MICROgram(s) HFA Inhaler 2 Puff(s) Inhalation every 6 hours PRN Shortness of Breath and/or Wheezing      Allergies    No Known Allergies    Intolerances        PAST MEDICAL & SURGICAL HISTORY:  HTN (hypertension)      HLD (hyperlipidemia)      Asthma      S/P total hysterectomy      H/O shoulder surgery      H/O knee surgery          FAMILY HISTORY:      SOCIAL HISTORY  Smoking History:     REVIEW OF SYSTEMS:    CONSTITUTIONAL:  No fevers, chills, sweats    HEENT:  Eyes:  No diplopia or blurred vision. ENT:  No earache, sore throat or runny nose.    CARDIOVASCULAR:  No pressure, squeezing, tightness, or heaviness about the chest; no palpitations.    RESPIRATORY:  Per HPI    GASTROINTESTINAL:  No abdominal pain, nausea, vomiting or diarrhea.    GENITOURINARY:  No dysuria, frequency or urgency.    NEUROLOGIC:  No paresthesias, fasciculations, seizures or weakness.    PSYCHIATRIC:  No disorder of thought or mood.    Vital Signs Last 24 Hrs  T(C): 36.6 (2022 07:55), Max: 37 (31 May 2022 19:10)  T(F): 97.8 (2022 07:55), Max: 98.6 (31 May 2022 19:10)  HR: 53 (2022 07:55) (53 - 113)  BP: 140/70 (2022 07:55) (131/77 - 153/73)  BP(mean): --  RR: 17 (2022 07:55) (17 - 18)  SpO2: 96% (2022 07:55) (95% - 100%)  I&O's Detail      PHYSICAL EXAMINATION:    GENERAL: The patient is a well-developed, well-nourished _____in no apparent distress.     HEENT: Head is normocephalic and atraumatic. Extraocular muscles are intact. Mucous membranes are moist.     NECK: Supple.     LUNGS: Clear to auscultation without wheezing, rales, or rhonchi. Respirations unlabored    HEART: Regular rate and rhythm without murmur.    ABDOMEN: Soft, nontender, and nondistended.  No hepatosplenomegaly is noted.    EXTREMITIES: Without any cyanosis, clubbing, rash, lesions or edema.    NEUROLOGIC: Grossly intact.      LABS:                        14.8   7.82  )-----------( 295      ( 2022 07:03 )             41.1     06-01    132<L>  |  95<L>  |  15  ----------------------------<  96  3.4<L>   |  29  |  1.04    Ca    9.0      2022 07:03  Phos  2.5     06-01  Mg     2.1     06-01    TPro  7.2  /  Alb  3.3<L>  /  TBili  1.2  /  DBili  x   /  AST  30  /  ALT  37  /  AlkPhos  57  06-      Urinalysis Basic - ( 31 May 2022 14:22 )    Color: Yellow / Appearance: Clear / S.010 / pH: x  Gluc: x / Ketone: Small  / Bili: Negative / Urobili: Negative   Blood: x / Protein: 15 / Nitrite: Negative   Leuk Esterase: Moderate / RBC: 2-5 /HPF / WBC >50 /HPF   Sq Epi: x / Non Sq Epi: Moderate /HPF / Bacteria: TNTC /HPF              Serum Pro-Brain Natriuretic Peptide: 126 pg/mL (22 @ 13:32)          MICROBIOLOGY:    RADIOLOGY & ADDITIONAL STUDIES:    CXR:    Ct scan chest;    ekg;  < from: 12 Lead ECG (10.04. @ 17:56) >  Diagnosis Line Sinus bradycardia  Possible Left atrial enlargement  Left ventricular hypertrophy  T wave abnormality, consider lateral ischemia  Abnormal ECG      < end of copied text >    echo:

## 2022-06-01 NOTE — CONSULT NOTE ADULT - SUBJECTIVE AND OBJECTIVE BOX
C A R D I O L O G Y  *********************    DATE OF SERVICE: 06-01-22    HISTORY OF PRESENT ILLNESS: HPI:  Patient is 71 years old female with past medical history of HTN, HLD, Asthma, CAD s/p cath showed RCA 30% Stenosis who was brought to ED due to left side chest pain that started on Saturday. Patient states that she started feeling chest tightness, 4/10 with no alleviating factors. During Exam, Patient was lying comfortably in bed mentioned that her chest pain is getting better. EKG showed NSR. Troponin is mild elevated. Patient saw her cardiologist in last few weeks who told her she has low heart function. Patient denied any shortness of breath, N/V or abdominal pain or change in bowel movement. (31 May 2022 17:17)      PAST MEDICAL & SURGICAL HISTORY:  HTN (hypertension)      HLD (hyperlipidemia)      Asthma      S/P total hysterectomy      H/O shoulder surgery      H/O knee surgery              MEDICATIONS:  MEDICATIONS  (STANDING):  amLODIPine   Tablet 2.5 milliGRAM(s) Oral daily  ascorbic acid 500 milliGRAM(s) Oral daily  aspirin enteric coated 81 milliGRAM(s) Oral daily  atorvastatin 40 milliGRAM(s) Oral at bedtime  budesonide 160 MICROgram(s)/formoterol 4.5 MICROgram(s) Inhaler 2 Puff(s) Inhalation two times a day  cholecalciferol 2000 Unit(s) Oral daily  heparin   Injectable 5000 Unit(s) SubCutaneous every 8 hours  melatonin 3 milliGRAM(s) Oral at bedtime  metoprolol tartrate 12.5 milliGRAM(s) Oral two times a day  valsartan 320 milliGRAM(s) Oral daily      Allergies    No Known Allergies    Intolerances        FAMILY HISTORY:    Non-contributary for premature coronary disease or sudden cardiac death    SOCIAL HISTORY:    [ ] Non-smoker  [ ] Smoker  [ ] Alcohol    FLU VACCINE THIS YEAR STARTS IN AUGUST:  [ ] Yes    [ ] No    IF OVER 65 HAVE YOU EVER HAD A PNA VACCINE:  [ ] Yes    [ ] No       [ ] N/A      REVIEW OF SYSTEMS:  [ ]chest pain  [  ]shortness of breath  [  ]palpitations  [  ]syncope  [ ]near syncope [ ]upper extremity weakness   [ ] lower extremity weakness  [  ]diplopia  [  ]altered mental status   [  ]fevers  [ ]chills [ ]nausea  [ ]vomitting  [  ]dysphagia    [ ]abdominal pain  [ ]melena  [ ]BRBPR    [  ]epistaxis  [  ]rash    [ ]lower extremity edema        [X] All others negative	  [ ] Unable to obtain      LABS:	 	    CARDIAC MARKERS:        Troponin I, High Sensitivity Result: 82.6 ng/L (06-01-22 @ 00:14)  Troponin I, High Sensitivity Result: 135.6 ng/L (05-31-22 @ 19:52)  Troponin I, High Sensitivity Result: 115.3 ng/L (05-31-22 @ 13:32)                            14.8   7.82  )-----------( 295      ( 01 Jun 2022 07:03 )             41.1     Hb Trend: 14.8<--, 15.5<--    06-01    132<L>  |  95<L>  |  15  ----------------------------<  96  3.4<L>   |  29  |  1.04    Ca    9.0      01 Jun 2022 07:03  Phos  2.5     06-01  Mg     2.1     06-01    TPro  7.2  /  Alb  3.3<L>  /  TBili  1.2  /  DBili  x   /  AST  30  /  ALT  37  /  AlkPhos  57  06-01    Creatinine Trend: 1.04<--, 1.12<--, 1.05<--    Coags:      proBNP: Serum Pro-Brain Natriuretic Peptide: 126 pg/mL (05-31 @ 13:32)    Lipid Profile:   HgA1c:   TSH:         PHYSICAL EXAM:  T(C): 36.6 (06-01-22 @ 04:36), Max: 37 (05-31-22 @ 19:10)  HR: 70 (06-01-22 @ 05:19) (55 - 113)  BP: 133/61 (06-01-22 @ 04:36) (131/77 - 153/73)  RR: 18 (06-01-22 @ 04:36) (18 - 18)  SpO2: 100% (06-01-22 @ 04:36) (95% - 100%)  Wt(kg): --   BMI (kg/m2): 25.8 (05-31-22 @ 11:46)  I&O's Summary      Gen: Appears well in NAD  HEENT:  (-)icterus (-)pallor  CV: N S1 S2 1/6 MIKEY (+)2 Pulses B/l  Resp:  Clear to ausculatation B/L, normal effort  GI: (+) BS Soft, NT, ND  Lymph:  (-)Edema, (-)obvious lymphadenopathy  Skin: Warm to touch, Normal turgor  Psych: Appropriate mood and affect        TELEMETRY: 	      ECG:  	    RADIOLOGY:         CXR:     ASSESSMENT/PLAN: 	71y Female     C A R D I O L O G Y  *********************    DATE OF SERVICE: 06-01-22    HISTORY OF PRESENT ILLNESS: HPI:  Patient is 71 years old female with past medical history of HTN, HLD, Asthma, CAD s/p cath showed RCA 30% Stenosis in 2020 who was brought to ED due to left side chest pain that started on Saturday. Patient states that she started feeling chest tightness, 4/10 with no alleviating factors. During Exam, Patient was lying comfortably in bed mentioned that her chest pain is getting better. EKG showed NSR. Troponin is mild elevated. Patient saw her cardiologist in last few weeks who told her she has low heart function. Patient denied any shortness of breath, N/V or abdominal pain or change in bowel movement.       PAST MEDICAL & SURGICAL HISTORY:    HTN (hypertension)  HLD (hyperlipidemia)  Asthma  S/P total hysterectomy  H/O shoulder surgery  H/O knee surgery        MEDICATIONS:  MEDICATIONS  (STANDING):  amLODIPine   Tablet 2.5 milliGRAM(s) Oral daily  ascorbic acid 500 milliGRAM(s) Oral daily  aspirin enteric coated 81 milliGRAM(s) Oral daily  atorvastatin 40 milliGRAM(s) Oral at bedtime  budesonide 160 MICROgram(s)/formoterol 4.5 MICROgram(s) Inhaler 2 Puff(s) Inhalation two times a day  cholecalciferol 2000 Unit(s) Oral daily  heparin   Injectable 5000 Unit(s) SubCutaneous every 8 hours  melatonin 3 milliGRAM(s) Oral at bedtime  metoprolol tartrate 12.5 milliGRAM(s) Oral two times a day  valsartan 320 milliGRAM(s) Oral daily      Allergies    No Known Allergies    Intolerances        FAMILY HISTORY:    Non-contributary for premature coronary disease or sudden cardiac death    SOCIAL HISTORY:    [X ] Non-smoker  [ ] Smoker  [ ] Alcohol        REVIEW OF SYSTEMS:  [ ]chest pain  [  ]shortness of breath  [  ]palpitations  [  ]syncope  [ ]near syncope [ ]upper extremity weakness   [ ] lower extremity weakness  [  ]diplopia  [  ]altered mental status   [  ]fevers  [ ]chills [ ]nausea  [ ]vomitting  [  ]dysphagia    [ ]abdominal pain  [ ]melena  [ ]BRBPR    [  ]epistaxis  [  ]rash    [ ]lower extremity edema        [X] All others negative	  [ ] Unable to obtain      LABS:	 	    CARDIAC MARKERS:        Troponin I, High Sensitivity Result: 82.6 ng/L (06-01-22 @ 00:14)  Troponin I, High Sensitivity Result: 135.6 ng/L (05-31-22 @ 19:52)  Troponin I, High Sensitivity Result: 115.3 ng/L (05-31-22 @ 13:32)                            14.8   7.82  )-----------( 295      ( 01 Jun 2022 07:03 )             41.1     Hb Trend: 14.8<--, 15.5<--    06-01    132<L>  |  95<L>  |  15  ----------------------------<  96  3.4<L>   |  29  |  1.04    Ca    9.0      01 Jun 2022 07:03  Phos  2.5     06-01  Mg     2.1     06-01    TPro  7.2  /  Alb  3.3<L>  /  TBili  1.2  /  DBili  x   /  AST  30  /  ALT  37  /  AlkPhos  57  06-01    Creatinine Trend: 1.04<--, 1.12<--, 1.05<--      proBNP: Serum Pro-Brain Natriuretic Peptide: 126 pg/mL (05-31 @ 13:32)      PHYSICAL EXAM:  T(C): 36.6 (06-01-22 @ 04:36), Max: 37 (05-31-22 @ 19:10)  HR: 70 (06-01-22 @ 05:19) (55 - 113)  BP: 133/61 (06-01-22 @ 04:36) (131/77 - 153/73)  RR: 18 (06-01-22 @ 04:36) (18 - 18)  SpO2: 100% (06-01-22 @ 04:36) (95% - 100%)  Wt(kg): --   BMI (kg/m2): 25.8 (05-31-22 @ 11:46)  I&O's Summary      Gen: Appears well in NAD  HEENT:  (-)icterus (-)pallor  CV: N S1 S2 1/6 MIKEY (+)2 Pulses B/l  Resp:  Clear to ausculatation B/L, normal effort  GI: (+) BS Soft, NT, ND  Lymph:  (-)Edema, (-)obvious lymphadenopathy  Skin: Warm to touch, Normal turgor  Psych: Appropriate mood and affect        TELEMETRY: 	  sinus    ECG:  	sinus 84 BPM, nonspecifc T wave abnormality in the lateral leads    RADIOLOGY:         CXR:   < from: Xray Chest 1 View- PORTABLE-Urgent (10.04.21 @ 16:29) >  The heart is magnified by AP film. There is no parenchymal consolidation, congestion, or pleural effusion bilaterally.  The trachea is midline. Osseous structures are normal for age.      < end of copied text >      ASSESSMENT/PLAN: 	71y Female  past medical history of HTN, HLD, Asthma, CAD s/p cath showed RCA 30% Stenosis in 2020 who was brought to ED due to left side chest pain that started on Saturday (+) trop.    - Echo  - Check DDimmer or CTPA to r/o PE  - If above within normal Limits plan for stress in AM    I once again thank you for allowing me to participate in the care of your patient.  If you have any questions or concerns please do not hesitate to contact me.    El Woodall MD, [facility]  BEEPER [phone]

## 2022-06-02 ENCOUNTER — TRANSCRIPTION ENCOUNTER (OUTPATIENT)
Age: 72
End: 2022-06-02

## 2022-06-02 VITALS
OXYGEN SATURATION: 97 % | HEART RATE: 59 BPM | SYSTOLIC BLOOD PRESSURE: 153 MMHG | TEMPERATURE: 98 F | DIASTOLIC BLOOD PRESSURE: 60 MMHG | RESPIRATION RATE: 17 BRPM

## 2022-06-02 LAB
-  AMIKACIN: SIGNIFICANT CHANGE UP
-  AMOXICILLIN/CLAVULANIC ACID: SIGNIFICANT CHANGE UP
-  AMPICILLIN/SULBACTAM: SIGNIFICANT CHANGE UP
-  AMPICILLIN: SIGNIFICANT CHANGE UP
-  AZTREONAM: SIGNIFICANT CHANGE UP
-  CEFAZOLIN: SIGNIFICANT CHANGE UP
-  CEFEPIME: SIGNIFICANT CHANGE UP
-  CEFOXITIN: SIGNIFICANT CHANGE UP
-  CEFTRIAXONE: SIGNIFICANT CHANGE UP
-  CIPROFLOXACIN: SIGNIFICANT CHANGE UP
-  ERTAPENEM: SIGNIFICANT CHANGE UP
-  GENTAMICIN: SIGNIFICANT CHANGE UP
-  IMIPENEM: SIGNIFICANT CHANGE UP
-  LEVOFLOXACIN: SIGNIFICANT CHANGE UP
-  MEROPENEM: SIGNIFICANT CHANGE UP
-  NITROFURANTOIN: SIGNIFICANT CHANGE UP
-  PIPERACILLIN/TAZOBACTAM: SIGNIFICANT CHANGE UP
-  TIGECYCLINE: SIGNIFICANT CHANGE UP
-  TOBRAMYCIN: SIGNIFICANT CHANGE UP
-  TRIMETHOPRIM/SULFAMETHOXAZOLE: SIGNIFICANT CHANGE UP
ANION GAP SERPL CALC-SCNC: 6 MMOL/L — SIGNIFICANT CHANGE UP (ref 5–17)
BUN SERPL-MCNC: 14 MG/DL — SIGNIFICANT CHANGE UP (ref 7–18)
CALCIUM SERPL-MCNC: 9.4 MG/DL — SIGNIFICANT CHANGE UP (ref 8.4–10.5)
CHLORIDE SERPL-SCNC: 96 MMOL/L — SIGNIFICANT CHANGE UP (ref 96–108)
CO2 SERPL-SCNC: 31 MMOL/L — SIGNIFICANT CHANGE UP (ref 22–31)
CREAT SERPL-MCNC: 0.96 MG/DL — SIGNIFICANT CHANGE UP (ref 0.5–1.3)
CULTURE RESULTS: SIGNIFICANT CHANGE UP
EGFR: 63 ML/MIN/1.73M2 — SIGNIFICANT CHANGE UP
GLUCOSE SERPL-MCNC: 102 MG/DL — HIGH (ref 70–99)
HCT VFR BLD CALC: 39.6 % — SIGNIFICANT CHANGE UP (ref 34.5–45)
HGB BLD-MCNC: 14.2 G/DL — SIGNIFICANT CHANGE UP (ref 11.5–15.5)
MAGNESIUM SERPL-MCNC: 2.2 MG/DL — SIGNIFICANT CHANGE UP (ref 1.6–2.6)
MCHC RBC-ENTMCNC: 31.6 PG — SIGNIFICANT CHANGE UP (ref 27–34)
MCHC RBC-ENTMCNC: 35.9 GM/DL — SIGNIFICANT CHANGE UP (ref 32–36)
MCV RBC AUTO: 88 FL — SIGNIFICANT CHANGE UP (ref 80–100)
METHOD TYPE: SIGNIFICANT CHANGE UP
NRBC # BLD: 0 /100 WBCS — SIGNIFICANT CHANGE UP (ref 0–0)
ORGANISM # SPEC MICROSCOPIC CNT: SIGNIFICANT CHANGE UP
ORGANISM # SPEC MICROSCOPIC CNT: SIGNIFICANT CHANGE UP
PHOSPHATE SERPL-MCNC: 2.7 MG/DL — SIGNIFICANT CHANGE UP (ref 2.5–4.5)
PLATELET # BLD AUTO: 268 K/UL — SIGNIFICANT CHANGE UP (ref 150–400)
POTASSIUM SERPL-MCNC: 4.3 MMOL/L — SIGNIFICANT CHANGE UP (ref 3.5–5.3)
POTASSIUM SERPL-SCNC: 4.3 MMOL/L — SIGNIFICANT CHANGE UP (ref 3.5–5.3)
RBC # BLD: 4.5 M/UL — SIGNIFICANT CHANGE UP (ref 3.8–5.2)
RBC # FLD: 12.1 % — SIGNIFICANT CHANGE UP (ref 10.3–14.5)
SODIUM SERPL-SCNC: 133 MMOL/L — LOW (ref 135–145)
SPECIMEN SOURCE: SIGNIFICANT CHANGE UP
WBC # BLD: 6.76 K/UL — SIGNIFICANT CHANGE UP (ref 3.8–10.5)
WBC # FLD AUTO: 6.76 K/UL — SIGNIFICANT CHANGE UP (ref 3.8–10.5)

## 2022-06-02 PROCEDURE — 78452 HT MUSCLE IMAGE SPECT MULT: CPT

## 2022-06-02 PROCEDURE — 99285 EMERGENCY DEPT VISIT HI MDM: CPT | Mod: 25

## 2022-06-02 PROCEDURE — 84484 ASSAY OF TROPONIN QUANT: CPT

## 2022-06-02 PROCEDURE — 87186 SC STD MICRODIL/AGAR DIL: CPT

## 2022-06-02 PROCEDURE — 93005 ELECTROCARDIOGRAM TRACING: CPT

## 2022-06-02 PROCEDURE — 0225U NFCT DS DNA&RNA 21 SARSCOV2: CPT

## 2022-06-02 PROCEDURE — 85379 FIBRIN DEGRADATION QUANT: CPT

## 2022-06-02 PROCEDURE — 93306 TTE W/DOPPLER COMPLETE: CPT

## 2022-06-02 PROCEDURE — 83735 ASSAY OF MAGNESIUM: CPT

## 2022-06-02 PROCEDURE — 83880 ASSAY OF NATRIURETIC PEPTIDE: CPT

## 2022-06-02 PROCEDURE — 80053 COMPREHEN METABOLIC PANEL: CPT

## 2022-06-02 PROCEDURE — 36415 COLL VENOUS BLD VENIPUNCTURE: CPT

## 2022-06-02 PROCEDURE — 80048 BASIC METABOLIC PNL TOTAL CA: CPT

## 2022-06-02 PROCEDURE — A9502: CPT

## 2022-06-02 PROCEDURE — 83690 ASSAY OF LIPASE: CPT

## 2022-06-02 PROCEDURE — 93017 CV STRESS TEST TRACING ONLY: CPT

## 2022-06-02 PROCEDURE — 94640 AIRWAY INHALATION TREATMENT: CPT

## 2022-06-02 PROCEDURE — 84100 ASSAY OF PHOSPHORUS: CPT

## 2022-06-02 PROCEDURE — 84300 ASSAY OF URINE SODIUM: CPT

## 2022-06-02 PROCEDURE — 81001 URINALYSIS AUTO W/SCOPE: CPT

## 2022-06-02 PROCEDURE — 83935 ASSAY OF URINE OSMOLALITY: CPT

## 2022-06-02 PROCEDURE — 36000 PLACE NEEDLE IN VEIN: CPT

## 2022-06-02 PROCEDURE — 85025 COMPLETE CBC W/AUTO DIFF WBC: CPT

## 2022-06-02 PROCEDURE — 82962 GLUCOSE BLOOD TEST: CPT

## 2022-06-02 PROCEDURE — 87086 URINE CULTURE/COLONY COUNT: CPT

## 2022-06-02 PROCEDURE — 85027 COMPLETE CBC AUTOMATED: CPT

## 2022-06-02 RX ORDER — CEFUROXIME AXETIL 250 MG
1 TABLET ORAL
Qty: 4 | Refills: 0
Start: 2022-06-02 | End: 2022-06-03

## 2022-06-02 RX ORDER — AMLODIPINE BESYLATE 2.5 MG/1
1 TABLET ORAL
Qty: 30 | Refills: 0
Start: 2022-06-02 | End: 2022-07-01

## 2022-06-02 RX ORDER — AMLODIPINE BESYLATE 2.5 MG/1
1 TABLET ORAL
Qty: 0 | Refills: 0 | DISCHARGE
Start: 2022-06-02

## 2022-06-02 RX ADMIN — CEFTRIAXONE 100 MILLIGRAM(S): 500 INJECTION, POWDER, FOR SOLUTION INTRAMUSCULAR; INTRAVENOUS at 12:17

## 2022-06-02 RX ADMIN — HEPARIN SODIUM 5000 UNIT(S): 5000 INJECTION INTRAVENOUS; SUBCUTANEOUS at 05:52

## 2022-06-02 RX ADMIN — Medication 2000 UNIT(S): at 12:17

## 2022-06-02 RX ADMIN — Medication 81 MILLIGRAM(S): at 12:17

## 2022-06-02 RX ADMIN — VALSARTAN 320 MILLIGRAM(S): 80 TABLET ORAL at 05:52

## 2022-06-02 RX ADMIN — Medication 12.5 MILLIGRAM(S): at 05:52

## 2022-06-02 RX ADMIN — BUDESONIDE AND FORMOTEROL FUMARATE DIHYDRATE 2 PUFF(S): 160; 4.5 AEROSOL RESPIRATORY (INHALATION) at 12:18

## 2022-06-02 RX ADMIN — AMLODIPINE BESYLATE 2.5 MILLIGRAM(S): 2.5 TABLET ORAL at 05:52

## 2022-06-02 RX ADMIN — Medication 500 MILLIGRAM(S): at 12:17

## 2022-06-02 NOTE — PROGRESS NOTE ADULT - SUBJECTIVE AND OBJECTIVE BOX
Patient is a 71y old  Female who presents with a chief complaint of ACS (01 Jun 2022 10:35)    pt seen in icu [  ], reg med floor [ x  ], bed [  ], chair at bedside [ x  ], a+o x3 [ x ], lethargic [  ],    nad [x ]      Allergies    No Known Allergies        Vitals    T(F): 98.2 (06-02-22 @ 04:30), Max: 98.4 (06-01-22 @ 23:35)  HR: 60 (06-02-22 @ 04:30) (53 - 64)  BP: 123/67 (06-02-22 @ 04:30) (119/68 - 152/66)  RR: 18 (06-02-22 @ 04:30) (16 - 18)  SpO2: 95% (06-02-22 @ 04:30) (94% - 97%)  Wt(kg): --  CAPILLARY BLOOD GLUCOSE      POCT Blood Glucose.: 127 mg/dL (31 May 2022 11:50)      Labs                          14.8   7.82  )-----------( 295      ( 01 Jun 2022 07:03 )             41.1       06-01    132<L>  |  95<L>  |  15  ----------------------------<  96  3.4<L>   |  29  |  1.04    Ca    9.0      01 Jun 2022 07:03  Phos  2.5     06-01  Mg     2.1     06-01    TPro  7.2  /  Alb  3.3<L>  /  TBili  1.2  /  DBili  x   /  AST  30  /  ALT  37  /  AlkPhos  57  06-01          Troponin I, High Sensitivity Result: 82.6 ng/L (06-01-22 @ 00:14)  Troponin I, High Sensitivity Result: 135.6 ng/L (05-31-22 @ 19:52)  Troponin I, High Sensitivity Result: 115.3 ng/L (05-31-22 @ 13:32)    Clean Catch Clean Catch (Midstream)  05-31 @ 21:40   >100,000 CFU/ml Escherichia coli  --  --          Radiology Results      Meds    MEDICATIONS  (STANDING):  amLODIPine   Tablet 2.5 milliGRAM(s) Oral daily  ascorbic acid 500 milliGRAM(s) Oral daily  aspirin enteric coated 81 milliGRAM(s) Oral daily  atorvastatin 40 milliGRAM(s) Oral at bedtime  budesonide 160 MICROgram(s)/formoterol 4.5 MICROgram(s) Inhaler 2 Puff(s) Inhalation two times a day  cefTRIAXone   IVPB 1000 milliGRAM(s) IV Intermittent every 24 hours  cholecalciferol 2000 Unit(s) Oral daily  heparin   Injectable 5000 Unit(s) SubCutaneous every 8 hours  melatonin 3 milliGRAM(s) Oral at bedtime  metoprolol tartrate 12.5 milliGRAM(s) Oral two times a day  valsartan 320 milliGRAM(s) Oral daily      MEDICATIONS  (PRN):  ALBUTerol    90 MICROgram(s) HFA Inhaler 2 Puff(s) Inhalation every 6 hours PRN Shortness of Breath and/or Wheezing      Physical Exam    Neuro :  no focal deficits  Respiratory: CTA B/L  CV: RRR, S1S2, no murmurs,   Abdominal: Soft, NT, ND +BS,  Extremities: No edema, + peripheral pulses    ASSESSMENT    Non-ST elevation myocardial infarction (NSTEMI)  chest pain,    r/o acs,   uti,   hyponatremia,   hypokalemia,   h/o HTN (hypertension)  HLD (hyperlipidemia)  Asthma  CAD s/p cath showed RCA 30% Stenosis  S/P total hysterectomy  shoulder surgery  knee surgery      PLAN    cont tele,   trop x3 positive noted above  acs protocol,   lopressor, aspirin, statin,   cardio cons noted  f/u echo   D Dimmer neg noted above  stress in AM  cont rocephin, ivf,   f/u ucx   pulm cons    Bronchodilators prn  Symbicort HFA  PFTs as OP.  cont current meds         Patient is a 71y old  Female who presents with a chief complaint of ACS (01 Jun 2022 10:35)    pt seen in icu [  ], reg med floor [ x  ], bed [  ], chair at bedside [ x  ], a+o x3 [ x ], lethargic [  ],    nad [x ]      Allergies    No Known Allergies        Vitals    T(F): 98.2 (06-02-22 @ 04:30), Max: 98.4 (06-01-22 @ 23:35)  HR: 60 (06-02-22 @ 04:30) (53 - 64)  BP: 123/67 (06-02-22 @ 04:30) (119/68 - 152/66)  RR: 18 (06-02-22 @ 04:30) (16 - 18)  SpO2: 95% (06-02-22 @ 04:30) (94% - 97%)  Wt(kg): --  CAPILLARY BLOOD GLUCOSE      POCT Blood Glucose.: 127 mg/dL (31 May 2022 11:50)      Labs                          14.8   7.82  )-----------( 295      ( 01 Jun 2022 07:03 )             41.1       06-01    132<L>  |  95<L>  |  15  ----------------------------<  96  3.4<L>   |  29  |  1.04    Ca    9.0      01 Jun 2022 07:03  Phos  2.5     06-01  Mg     2.1     06-01    TPro  7.2  /  Alb  3.3<L>  /  TBili  1.2  /  DBili  x   /  AST  30  /  ALT  37  /  AlkPhos  57  06-01    < from: Transthoracic Echocardiogram (06.01.22 @ 07:33) >  CONCLUSIONS:  1. Trace mitral regurgitation.  2. Mild left atrial enlargement.  3. Mild concentric left ventricular hypertrophy.  4. Normal left ventricular systolic function.  5. Grade I diastolic dysfunction (Impaired relaxation,  mild).  6. Normal right ventricular size and function.  7. Trivial pericardial effusion.  Ejection Fraction Visual Estimate: >55 %    < end of copied text >        Troponin I, High Sensitivity Result: 82.6 ng/L (06-01-22 @ 00:14)  Troponin I, High Sensitivity Result: 135.6 ng/L (05-31-22 @ 19:52)  Troponin I, High Sensitivity Result: 115.3 ng/L (05-31-22 @ 13:32)    Clean Catch Clean Catch (Midstream)  05-31 @ 21:40   >100,000 CFU/ml Escherichia coli  --  --          Radiology Results      Meds    MEDICATIONS  (STANDING):  amLODIPine   Tablet 2.5 milliGRAM(s) Oral daily  ascorbic acid 500 milliGRAM(s) Oral daily  aspirin enteric coated 81 milliGRAM(s) Oral daily  atorvastatin 40 milliGRAM(s) Oral at bedtime  budesonide 160 MICROgram(s)/formoterol 4.5 MICROgram(s) Inhaler 2 Puff(s) Inhalation two times a day  cefTRIAXone   IVPB 1000 milliGRAM(s) IV Intermittent every 24 hours  cholecalciferol 2000 Unit(s) Oral daily  heparin   Injectable 5000 Unit(s) SubCutaneous every 8 hours  melatonin 3 milliGRAM(s) Oral at bedtime  metoprolol tartrate 12.5 milliGRAM(s) Oral two times a day  valsartan 320 milliGRAM(s) Oral daily      MEDICATIONS  (PRN):  ALBUTerol    90 MICROgram(s) HFA Inhaler 2 Puff(s) Inhalation every 6 hours PRN Shortness of Breath and/or Wheezing      Physical Exam    Neuro :  no focal deficits  Respiratory: CTA B/L  CV: RRR, S1S2, no murmurs,   Abdominal: Soft, NT, ND +BS,  Extremities: No edema, + peripheral pulses    ASSESSMENT    Non-ST elevation myocardial infarction (NSTEMI)  chest pain,    r/o acs,   uti,   hyponatremia,   hypokalemia,   h/o HTN (hypertension)  HLD (hyperlipidemia)  Asthma  CAD s/p cath showed RCA 30% Stenosis  S/P total hysterectomy  shoulder surgery  knee surgery      PLAN    cont tele,   trop x3 positive noted above  acs protocol,   lopressor, aspirin, statin,   cardio f/u   echo with Ejection Fraction Visual Estimate: >55 %, Grade I diastolic dysfunction. Normal right ventricular size and function. Trivial pericardial effusion noted above.  D Dimmer neg noted    f/u stress test   cont rocephin   may change to ceftin 250 mg q12 to complete 5 days abx on d/c   ucx with e coli noted above  hyponatremia and hypokalemia improving    pulm f/u     Bronchodilators prn  Symbicort HFA  PFTs as OP.  cont current meds   d/c plan if stress test neg

## 2022-06-02 NOTE — DISCHARGE NOTE PROVIDER - HOSPITAL COURSE
71 year old female with PMH of HTN, HLD, Asthma, CAD s/p cath showed RCA 30% Stenosis was brought to ED due to left side chest pain that started on Saturday. Patient states that she started feeling chest tightness, 4/10 with no alleviating factors. Symptoms resolved in ED. EKG showed NSR. Troponin is mild elevated. Na is 124. Patient admitted to telemetry for further workup. Troponin trended down, pt started on ASA, Statin and low dose lopressor. Cardiology Talisha was consulted. Echo showed normal EF, GIDD. Hyponattremia corrected with IVF slowly. UA grossly positive, culture growing e. coli. Pt started on ceftriaxone. Home meds continued for HTN, HLD, and asthma. Patient medically stable at this time for discharge with outpatient follow up with PCP.

## 2022-06-02 NOTE — DISCHARGE NOTE NURSING/CASE MANAGEMENT/SOCIAL WORK - PATIENT PORTAL LINK FT
You can access the FollowMyHealth Patient Portal offered by [facility] by registering at the following website: [email or web address]. By joining [employer]’s FollowMyHealth portal, you will also be able to view your health information using other applications (apps) compatible with our system.

## 2022-06-02 NOTE — DISCHARGE NOTE PROVIDER - NSDCMRMEDTOKEN_GEN_ALL_CORE_FT
alendronate 35 mg oral tablet: 1 tab(s) orally once a week  amLODIPine 2.5 mg oral tablet: 1 tab(s) orally once a day  ascorbic acid 1000 mg oral tablet: 1 tab(s) orally once a day  Aspir 81 oral delayed release tablet: 1 tab(s) orally once a day  chlorthalidone 25 mg oral tablet: 1 tab(s) orally once a day  cholecalciferol oral tablet: 1 tab(s) orally once a day   Lipitor 40 mg oral tablet: 1 tab(s) orally once a day  metoprolol succinate 25 mg oral tablet, extended release: 1 tab(s) orally once a day  Symbicort 160 mcg-4.5 mcg/inh inhalation aerosol: 2 puff(s) inhaled 2 times a day   valsartan 320 mg oral tablet: 1 tab(s) orally once a day  Ventolin HFA 90 mcg/inh inhalation aerosol: 2 puff(s) inhaled every 4 hours, As Needed- for shortness of breath and/or wheezing    alendronate 35 mg oral tablet: 1 tab(s) orally once a week  amLODIPine 2.5 mg oral tablet: 1 tab(s) orally once a day  ascorbic acid 1000 mg oral tablet: 1 tab(s) orally once a day  Aspir 81 oral delayed release tablet: 1 tab(s) orally once a day  cholecalciferol oral tablet: 1 tab(s) orally once a day   Lipitor 40 mg oral tablet: 1 tab(s) orally once a day  metoprolol succinate 25 mg oral tablet, extended release: 1 tab(s) orally once a day  Symbicort 160 mcg-4.5 mcg/inh inhalation aerosol: 2 puff(s) inhaled 2 times a day   valsartan 320 mg oral tablet: 1 tab(s) orally once a day  Ventolin HFA 90 mcg/inh inhalation aerosol: 2 puff(s) inhaled every 4 hours, As Needed- for shortness of breath and/or wheezing    alendronate 35 mg oral tablet: 1 tab(s) orally once a week  ascorbic acid 1000 mg oral tablet: 1 tab(s) orally once a day  Aspir 81 oral delayed release tablet: 1 tab(s) orally once a day  cefuroxime 250 mg oral tablet: 1 tab(s) orally 2 times a day   cholecalciferol oral tablet: 1 tab(s) orally once a day   Lipitor 40 mg oral tablet: 1 tab(s) orally once a day  Norvasc 5 mg oral tablet: 1 tab(s) orally once a day  Symbicort 160 mcg-4.5 mcg/inh inhalation aerosol: 2 puff(s) inhaled 2 times a day   valsartan 320 mg oral tablet: 1 tab(s) orally once a day  Ventolin HFA 90 mcg/inh inhalation aerosol: 2 puff(s) inhaled every 4 hours, As Needed- for shortness of breath and/or wheezing

## 2022-06-02 NOTE — DISCHARGE NOTE PROVIDER - NSDCCPCAREPLAN_GEN_ALL_CORE_FT
PRINCIPAL DISCHARGE DIAGNOSIS  Diagnosis: UTI (urinary tract infection)  Assessment and Plan of Treatment: You were found to have a urinary infeciton. We started you on IV antibiotics. . Please take ceftine twice a day for 3 more days to finish you course of antibiotics. Please follow up with your primary doctor within 1 week of hospitalization for further management.      SECONDARY DISCHARGE DIAGNOSES  Diagnosis: Angina pectoris  Assessment and Plan of Treatment: You came to the hospital complaining of chest pain. EKG was normal. Cardiac enazymes were mildly elevated and then trended down. Echocardiogram of your heart was normal and a stress test was normal . Please follow up with your primary doctor and/or cardiolgost within 1-2 weeks for further management.    Diagnosis: Hyponatremia  Assessment and Plan of Treatment: You were found to have a low sodium level in your blood. We corrected this slowly with IV fluids to prevent complications. Please follow up with your primary doctor within 1 week of hospitalization for further management.    Diagnosis: Hypertension  Assessment and Plan of Treatment: You have previously been diagnosed with hypertension (high blood pressure). We managed your blood pressure during your hospitalization with your home medications. Please take your medications on time and as prescribed. Monitor your blood pressure at home, keep a log of your home readings and follow up with your Primary Care Physician. As per AHA/ACC guidelines, it is important to adhere to a DASH Diet of fresh fruits, vegetables, lean meats such as poultry and fish, and whole wheat carbs. 30 minutes of aerobic exercise per day 3-4 times a week, reducing salt intake <1.5g/day, and cutting down on highly processed foods are also shown to reduce high blood pressure. Uncontrolled BP may result in organ damage to your eyes, brain, heart, and kidneys by causing strokes, heart attacks, kidney failure, and bleeds in your eye. Please follow up with your primary care physician within 1-2 weeks after discharge and routinely after.    Diagnosis: Hyperlipidemia  Assessment and Plan of Treatment: You have previously been diagnosed with high cholesterol. We continued your home medications during your hospitalization. Please continue to take your medications as prescribed and follow up with your primary doctor within 1 week of discharge and routinely after for further management.    Diagnosis: Asthma  Assessment and Plan of Treatment: You have previously been diagnosed with asthma. We continued your home medications during your hospitalization. Please continue to take your medications as prescribed and follow up with your primary doctor within 1 week of discharge and routinely after for further management.     PRINCIPAL DISCHARGE DIAGNOSIS  Diagnosis: UTI (urinary tract infection)  Assessment and Plan of Treatment: You were found to have a urinary infeciton. We started you on IV antibiotics in the hospital. Please take ceftine twice a day for 2 more days to finish you course of antibiotics. Please follow up with your primary doctor within 1 week of hospitalization for further management.      SECONDARY DISCHARGE DIAGNOSES  Diagnosis: Angina pectoris  Assessment and Plan of Treatment: You came to the hospital complaining of chest pain. EKG was normal. Cardiac enzymes were mildly elevated and then trended down. Echocardiogram of your heart was normal and a stress test was normal . Please follow up with your primary doctor and/or cardiolgost within 1-2 weeks for further management.    Diagnosis: Hyponatremia  Assessment and Plan of Treatment: You were found to have a low sodium level in your blood. We corrected this slowly with IV fluids to prevent complications. We recommendd to STOP taking hydochlorothizaide as this can cuase dehyrdation and low sodium. Please follow up with your primary doctor within 1 week of hospitalization for further management.    Diagnosis: Hypertension  Assessment and Plan of Treatment: You have previously been diagnosed with hypertension (high blood pressure). We managed your blood pressure during your hospitalization with your home medications. Please take your medications on time and as prescribed. Monitor your blood pressure at home, keep a log of your home readings and follow up with your Primary Care Physician. As per AHA/ACC guidelines, it is important to adhere to a DASH Diet of fresh fruits, vegetables, lean meats such as poultry and fish, and whole wheat carbs. 30 minutes of aerobic exercise per day 3-4 times a week, reducing salt intake <1.5g/day, and cutting down on highly processed foods are also shown to reduce high blood pressure. Uncontrolled BP may result in organ damage to your eyes, brain, heart, and kidneys by causing strokes, heart attacks, kidney failure, and bleeds in your eye. Please follow up with your primary care physician within 1-2 weeks after discharge and routinely after.    Diagnosis: Hyperlipidemia  Assessment and Plan of Treatment: You have previously been diagnosed with high cholesterol. We continued your home medications during your hospitalization. Please continue to take your medications as prescribed and follow up with your primary doctor within 1 week of discharge and routinely after for further management.    Diagnosis: Asthma  Assessment and Plan of Treatment: You have previously been diagnosed with asthma. We continued your home medications during your hospitalization. Please continue to take your medications as prescribed and follow up with your primary doctor within 1 week of discharge and routinely after for further management.     PRINCIPAL DISCHARGE DIAGNOSIS  Diagnosis: UTI (urinary tract infection)  Assessment and Plan of Treatment: You were found to have a urinary infeciton. We started you on IV antibiotics in the hospital. Please take ceftine twice a day for 2 more days to finish you course of antibiotics. Please follow up with your primary doctor within 1 week of hospitalization for further management.      SECONDARY DISCHARGE DIAGNOSES  Diagnosis: Angina pectoris  Assessment and Plan of Treatment: You came to the hospital complaining of chest pain. EKG was normal. Cardiac enzymes were mildly elevated and then trended down. Echocardiogram of your heart was normal and a stress test was normal . Please follow up with your primary doctor and/or cardiolgost within 1-2 weeks for further management.    Diagnosis: Hypertension  Assessment and Plan of Treatment: You have previously been diagnosed with hypertension (high blood pressure). We managed your blood pressure during your hospitalization and the cardiologist made some changes to your blood pressure medication. Plase stop taking metoprolol as it caused bradycardia (low heart rate) and stop taking hydrochlorothiazide. Instead we are increasing your amlodipine to 5mg instead of 2.5mg.  Please take your medications on time and as prescribed. Monitor your blood pressure at home, keep a log of your home readings and follow up with your Primary Care Physician within 1-2 weeeks of discharge.   MORE RECOMENDATIONS:   As per AHA/ACC guidelines, it is important to adhere to a DASH Diet of fresh fruits, vegetables, lean meats such as poultry and fish, and whole wheat carbs. 30 minutes of aerobic exercise per day 3-4 times a week, reducing salt intake <1.5g/day, and cutting down on highly processed foods are also shown to reduce high blood pressure. Uncontrolled BP may result in organ damage to your eyes, brain, heart, and kidneys by causing strokes, heart attacks, kidney failure, and bleeds in your eye. Please follow up with your primary care physician within 1-2 weeks after discharge and routinely after.    Diagnosis: Hyponatremia  Assessment and Plan of Treatment: You were found to have a low sodium level in your blood. We corrected this slowly with IV fluids to prevent complications. We recommendd to STOP taking hydochlorothizaide as this can cuase dehyrdation and low sodium. Please follow up with your primary doctor within 1 week of hospitalization for further management.    Diagnosis: Hyperlipidemia  Assessment and Plan of Treatment: You have previously been diagnosed with high cholesterol. We continued your home medications during your hospitalization. Please continue to take your medications as prescribed and follow up with your primary doctor within 1 week of discharge and routinely after for further management.    Diagnosis: Asthma  Assessment and Plan of Treatment: You have previously been diagnosed with asthma. We continued your home medications during your hospitalization. Please continue to take your medications as prescribed and follow up with your primary doctor within 1 week of discharge and routinely after for further management.

## 2022-06-02 NOTE — PROGRESS NOTE ADULT - SUBJECTIVE AND OBJECTIVE BOX
C A R D I O L O G Y  **********************************     DATE OF SERVICE: 06-02-22    Patient denies chest pain or shortness of breath.   Review of systems otherwise (-)  	  MEDICATIONS:  MEDICATIONS  (STANDING):  amLODIPine   Tablet 2.5 milliGRAM(s) Oral daily  ascorbic acid 500 milliGRAM(s) Oral daily  aspirin enteric coated 81 milliGRAM(s) Oral daily  atorvastatin 40 milliGRAM(s) Oral at bedtime  budesonide 160 MICROgram(s)/formoterol 4.5 MICROgram(s) Inhaler 2 Puff(s) Inhalation two times a day  cefTRIAXone   IVPB 1000 milliGRAM(s) IV Intermittent every 24 hours  cholecalciferol 2000 Unit(s) Oral daily  heparin   Injectable 5000 Unit(s) SubCutaneous every 8 hours  melatonin 3 milliGRAM(s) Oral at bedtime  metoprolol tartrate 12.5 milliGRAM(s) Oral two times a day  valsartan 320 milliGRAM(s) Oral daily      LABS:	 	    CARDIAC MARKERS:        Troponin I, High Sensitivity Result: 82.6 ng/L (06-01-22 @ 00:14)  Troponin I, High Sensitivity Result: 135.6 ng/L (05-31-22 @ 19:52)  Troponin I, High Sensitivity Result: 115.3 ng/L (05-31-22 @ 13:32)                              14.2   6.76  )-----------( 268      ( 02 Jun 2022 07:44 )             39.6     Hemoglobin: 14.2 g/dL (06-02 @ 07:44)  Hemoglobin: 14.8 g/dL (06-01 @ 07:03)  Hemoglobin: 15.5 g/dL (05-31 @ 13:32)      06-02    133<L>  |  96  |  14  ----------------------------<  102<H>  4.3   |  31  |  0.96    Ca    9.4      02 Jun 2022 07:44  Phos  2.7     06-02  Mg     2.2     06-02    TPro  7.2  /  Alb  3.3<L>  /  TBili  1.2  /  DBili  x   /  AST  30  /  ALT  37  /  AlkPhos  57  06-01    Creatinine Trend: 0.96<--, 1.04<--, 1.12<--, 1.05<--        PHYSICAL EXAM:  T(C): 36.2 (06-02-22 @ 07:46), Max: 36.9 (06-01-22 @ 23:35)  HR: 52 (06-02-22 @ 07:46) (52 - 64)  BP: 118/66 (06-02-22 @ 07:46) (118/66 - 152/66)  RR: 16 (06-02-22 @ 07:46) (16 - 18)  SpO2: 98% (06-02-22 @ 07:46) (94% - 98%)  Wt(kg): --  I&O's Summary          HEENT:  (-)icterus (-)pallor  CV: N S1 S2 1/6 MIKEY (+)2 Pulses B/l  Resp:  Clear to ausculatation B/L, normal effort  GI: (+) BS Soft, NT, ND  Lymph:  (-)Edema, (-)obvious lymphadenopathy  Skin: Warm to touch, Normal turgor  Psych: Appropriate mood and affect      TELEMETRY: 	  Sinus        ASSESSMENT/PLAN: 	71y  Female past medical history of HTN, HLD, Asthma, CAD s/p cath showed RCA 30% Stenosis in 2020 who was brought to ED due to left side chest pain that started on Saturday (+) trop.    - Echo with normal LV systolic function   - (-) DDimmer   - Stress with no ischemia or infarct  - No need for further inpatient cardiac work up.  - Cont ASA, statin  - oupt cardiac f/u [phone]    El Woodall MD, [facility]  BEEPER [phone]       C A R D I O L O G Y  **********************************     DATE OF SERVICE: 06-02-22    Patient denies chest pain or shortness of breath.   Review of systems otherwise (-)  	  MEDICATIONS:  MEDICATIONS  (STANDING):  amLODIPine   Tablet 2.5 milliGRAM(s) Oral daily  ascorbic acid 500 milliGRAM(s) Oral daily  aspirin enteric coated 81 milliGRAM(s) Oral daily  atorvastatin 40 milliGRAM(s) Oral at bedtime  budesonide 160 MICROgram(s)/formoterol 4.5 MICROgram(s) Inhaler 2 Puff(s) Inhalation two times a day  cefTRIAXone   IVPB 1000 milliGRAM(s) IV Intermittent every 24 hours  cholecalciferol 2000 Unit(s) Oral daily  heparin   Injectable 5000 Unit(s) SubCutaneous every 8 hours  melatonin 3 milliGRAM(s) Oral at bedtime  metoprolol tartrate 12.5 milliGRAM(s) Oral two times a day  valsartan 320 milliGRAM(s) Oral daily      LABS:	 	    CARDIAC MARKERS:        Troponin I, High Sensitivity Result: 82.6 ng/L (06-01-22 @ 00:14)  Troponin I, High Sensitivity Result: 135.6 ng/L (05-31-22 @ 19:52)  Troponin I, High Sensitivity Result: 115.3 ng/L (05-31-22 @ 13:32)                              14.2   6.76  )-----------( 268      ( 02 Jun 2022 07:44 )             39.6     Hemoglobin: 14.2 g/dL (06-02 @ 07:44)  Hemoglobin: 14.8 g/dL (06-01 @ 07:03)  Hemoglobin: 15.5 g/dL (05-31 @ 13:32)      06-02    133<L>  |  96  |  14  ----------------------------<  102<H>  4.3   |  31  |  0.96    Ca    9.4      02 Jun 2022 07:44  Phos  2.7     06-02  Mg     2.2     06-02    TPro  7.2  /  Alb  3.3<L>  /  TBili  1.2  /  DBili  x   /  AST  30  /  ALT  37  /  AlkPhos  57  06-01    Creatinine Trend: 0.96<--, 1.04<--, 1.12<--, 1.05<--        PHYSICAL EXAM:  T(C): 36.2 (06-02-22 @ 07:46), Max: 36.9 (06-01-22 @ 23:35)  HR: 52 (06-02-22 @ 07:46) (52 - 64)  BP: 118/66 (06-02-22 @ 07:46) (118/66 - 152/66)  RR: 16 (06-02-22 @ 07:46) (16 - 18)  SpO2: 98% (06-02-22 @ 07:46) (94% - 98%)  Wt(kg): --  I&O's Summary          HEENT:  (-)icterus (-)pallor  CV: N S1 S2 1/6 MIKEY (+)2 Pulses B/l  Resp:  Clear to ausculatation B/L, normal effort  GI: (+) BS Soft, NT, ND  Lymph:  (-)Edema, (-)obvious lymphadenopathy  Skin: Warm to touch, Normal turgor  Psych: Appropriate mood and affect      TELEMETRY: 	  Sinus        ASSESSMENT/PLAN: 	71y  Female past medical history of HTN, HLD, Asthma, CAD s/p cath showed RCA 30% Stenosis in 2020 who was brought to ED due to left side chest pain that started on Saturday (+) trop.    - Echo with normal LV systolic function   - (-) DDimmer   - Stress with no ischemia or infarct  - No need for further inpatient cardiac work up.  - Cont ASA, statin  - Can D/C BB given bradycardia  - oupt cardiac f/u [phone]    El Woodall MD, [facility]  BEEPER [phone]

## 2022-06-02 NOTE — DISCHARGE NOTE NURSING/CASE MANAGEMENT/SOCIAL WORK - NSDCPEFALRISK_GEN_ALL_CORE
For information on Fall & Injury Prevention, visit: [email or web address] OR  [email or web address] OR  https://www.cdc.gov/steadi/patient.html

## 2022-06-02 NOTE — DISCHARGE NOTE PROVIDER - CARE PROVIDER_API CALL
John Lara  INTERNAL MEDICINE  [address] [place], [place] [address]  Phone: [phone]  Fax: [phone]  Follow Up Time:

## 2022-12-14 ENCOUNTER — EMERGENCY (EMERGENCY)
Facility: HOSPITAL | Age: 72
LOS: 1 days | Discharge: HOME | End: 2022-12-14
Attending: EMERGENCY MEDICINE
Payer: MEDICARE

## 2022-12-14 VITALS
OXYGEN SATURATION: 100 % | SYSTOLIC BLOOD PRESSURE: 150 MMHG | HEART RATE: 65 BPM | RESPIRATION RATE: 18 BRPM | TEMPERATURE: 98 F | DIASTOLIC BLOOD PRESSURE: 78 MMHG

## 2022-12-14 VITALS
DIASTOLIC BLOOD PRESSURE: 50 MMHG | WEIGHT: 154.32 LBS | OXYGEN SATURATION: 98 % | RESPIRATION RATE: 18 BRPM | HEART RATE: 67 BPM | SYSTOLIC BLOOD PRESSURE: 93 MMHG | TEMPERATURE: 98 F

## 2022-12-14 DIAGNOSIS — Z90.710 ACQUIRED ABSENCE OF BOTH CERVIX AND UTERUS: Chronic | ICD-10-CM

## 2022-12-14 DIAGNOSIS — Z98.890 OTHER SPECIFIED POSTPROCEDURAL STATES: Chronic | ICD-10-CM

## 2022-12-14 LAB
FLUAV AG NPH QL: SIGNIFICANT CHANGE UP
FLUBV AG NPH QL: SIGNIFICANT CHANGE UP
SARS-COV-2 RNA SPEC QL NAA+PROBE: SIGNIFICANT CHANGE UP

## 2022-12-14 PROCEDURE — 94640 AIRWAY INHALATION TREATMENT: CPT

## 2022-12-14 PROCEDURE — 99284 EMERGENCY DEPT VISIT MOD MDM: CPT

## 2022-12-14 PROCEDURE — 99283 EMERGENCY DEPT VISIT LOW MDM: CPT | Mod: 25

## 2022-12-14 PROCEDURE — 87637 SARSCOV2&INF A&B&RSV AMP PRB: CPT

## 2022-12-14 RX ORDER — IPRATROPIUM/ALBUTEROL SULFATE 18-103MCG
3 AEROSOL WITH ADAPTER (GRAM) INHALATION
Refills: 0 | Status: DISCONTINUED | OUTPATIENT
Start: 2022-12-14 | End: 2022-12-18

## 2022-12-14 RX ADMIN — Medication 3 MILLILITER(S): at 13:17

## 2022-12-14 RX ADMIN — Medication 20 MILLIGRAM(S): at 13:25

## 2022-12-14 NOTE — ED PROVIDER NOTE - OBJECTIVE STATEMENT
72-year-old woman presenting complaining of cough and shortness of breath ongoing for the past few few days.  She is been trying her home inhalers without improvement.  She has a history of asthma asthma.  She was never a smoker.  She denies pains in her chest, fevers chills or sick contacts.  She denies associated abdominal pain nausea vomiting or other complaints.  Was admitted once in past for asthma for a few days, never in ICU.

## 2022-12-14 NOTE — ED ADULT TRIAGE NOTE - RESPIRATORY RATE (BREATHS/MIN)
I don't think I ever prescribed him tramadol.  Prescriber is Dr. Naeem Das, gave him #[phone] on 5/12/22.  Please route Rx to him, thanks.   18

## 2022-12-14 NOTE — ED PROVIDER NOTE - NSFOLLOWUPINSTRUCTIONS_ED_ALL_ED_FT
Thank you for choosing [facility] for your health care.    You were seen for an asthma exacerbation which was treated with oral steroids and breathing treatments and improved your symptoms.  You were given a prescription for steroids to take for the next few days at home and you can continue using your inhalers at home.  Please follow-up with your primary care doctor in the next few days.  The swab for respiratory viruses that was done in the ER has not yet resulted if it is COVID positive we will contact you with the results.  Please return to the ER for severe difficulty breathing, severe chest pains or any other concerning symptoms    Huy por elegir [facility] para dickerson atención médica.    Lo vieron por kassy exacerbación de asma que se trató con esteroides orales y tratamientos respiratorios y mejoró velia síntomas. Le dieron kassy receta de esteroides para que los tome oskar los próximos días en dickerson casa y puede continuar usando velia inhaladores en dickerson casa. Yue un seguimiento con dickerson médico de atención primaria en los próximos días. El hisopado para virus respiratorios que se hizo en urgencias aún no ha resultado si es COVID positivo nos pondremos en contacto con usted con los resultados. Regrese a la haily de emergencias si tiene dificultad para respirar severa, aylin de pecho severos o cualquier otro síntoma preocupante.

## 2022-12-14 NOTE — ED PROVIDER NOTE - CLINICAL SUMMARY MEDICAL DECISION MAKING FREE TEXT BOX
Patient presenting with asthma-like symptoms but no significant respiratory distress.  Will test patient for respiratory viruses treat for asthma and reevaluate.

## 2022-12-14 NOTE — ED PROVIDER NOTE - PHYSICAL EXAMINATION
Exam:  General: Patient well appearing, vital signs within normal limits  HEENT: airway patent with moist mucous membranes  Cardiac: RRR S1/S2 with strong peripheral pulses  Respiratory: bronchospastic cough appreciated, slight expiratory wheezing norespiratory distress  GI: abdomen soft, non tender, non distended  Neuro: no gross neurologic deficits  Skin: warm, well perfused  Psych: normal mood and affect

## 2023-01-25 NOTE — DISCHARGE NOTE ADULT - HOSPITAL COURSE
66 yo Tamazight speaking female with PMH of HTN, HLD, Asthma presented with chest tightness. Patient was shopping at 9:30 AM when suddenly she felt tightness in her chest associated with numbness in left arm and left hand. She felt nauseated and dizzy but did not pass out. She also felt that she could not breath and her body felt cold. She immediately called her daughter, who observed that there was purplish discolouration around her lips. There was no exacerbating or relieving factor. EMS arrived and started the patient on O2 and eventually she felt better. She denies any complain at present.   She is on Valsartan 320 mg for her BP. She informed me that her BP is not well controlled. She was taking amlodipine in the past but it was discontinued because she felt dizzy with it.   Her son also informed me that since last couple of days she has been very anxious and goes to her daughter house or son house because of anxiety. He did not mention any stress factor.   Patient had a stress test 3 years back, which was negative. On asking for the indication of stress test she said that she use to feel very anxious at that time so PCP had a stress test on her.     Patient was admitted to rule out ACS and neuro cause of dizziness. Cardiac enzymes were negative x3. EKG showed TWI in V4-V6, but no ST changes. Echo and stress normal. Chest pressure likely due to anxiety. MR head negative. Patient medically clear to be discharged.
independent

## 2023-01-28 ENCOUNTER — EMERGENCY (EMERGENCY)
Facility: HOSPITAL | Age: 73
LOS: 1 days | Discharge: HOME | End: 2023-01-28
Attending: EMERGENCY MEDICINE
Payer: COMMERCIAL

## 2023-01-28 VITALS
WEIGHT: 156.09 LBS | RESPIRATION RATE: 19 BRPM | SYSTOLIC BLOOD PRESSURE: 176 MMHG | HEART RATE: 86 BPM | OXYGEN SATURATION: 97 % | TEMPERATURE: 98 F | DIASTOLIC BLOOD PRESSURE: 79 MMHG | HEIGHT: 67 IN

## 2023-01-28 VITALS — DIASTOLIC BLOOD PRESSURE: 80 MMHG | SYSTOLIC BLOOD PRESSURE: 144 MMHG

## 2023-01-28 DIAGNOSIS — Z98.890 OTHER SPECIFIED POSTPROCEDURAL STATES: Chronic | ICD-10-CM

## 2023-01-28 DIAGNOSIS — Z90.710 ACQUIRED ABSENCE OF BOTH CERVIX AND UTERUS: Chronic | ICD-10-CM

## 2023-01-28 PROCEDURE — 99284 EMERGENCY DEPT VISIT MOD MDM: CPT

## 2023-01-28 PROCEDURE — 99283 EMERGENCY DEPT VISIT LOW MDM: CPT

## 2023-01-28 PROCEDURE — 93005 ELECTROCARDIOGRAM TRACING: CPT

## 2023-01-28 NOTE — ED PROVIDER NOTE - OBJECTIVE STATEMENT
72-year-old female past medical history of hypertension and hyperlipidemia presents for evaluation of elevated blood pressure reading earlier today.  Patient checks her blood pressure many times per day and earlier it was 150 systolic which prompted her to come to ER.  Denies complaints.  Congolese translation via family at bedside

## 2023-01-28 NOTE — ED PROVIDER NOTE - CLINICAL SUMMARY MEDICAL DECISION MAKING FREE TEXT BOX
72-year-old female with elevated blood pressure reading at home.  Blood pressure improved here without treatment.  Patient education and reassurance regarding management of chronic hypertension.  DC with continued regimen for hypertension and PCP follow-up.  Discussed indication for patient return to ED.  Patient and her daughter at bedside understood.

## 2023-01-28 NOTE — ED PROVIDER NOTE - PATIENT PORTAL LINK FT
You can access the FollowMyHealth Patient Portal offered by [facility] by registering at the following website: [email or web address]. By joining [facility]’s FollowMyHealth portal, you will also be able to view your health information using other applications (apps) compatible with our system.

## 2023-02-12 NOTE — ED ADULT NURSE NOTE - PAIN: PRESENCE, MLM
No wheezing/clear to mild end expiratory wheeze or scattered expiratory wheeze denies pain/discomfort

## 2023-03-07 NOTE — PATIENT PROFILE ADULT - OVER THE PAST TWO WEEKS, HAVE YOU FELT LITTLE INTEREST OR PLEASURE IN DOING THINGS?
CHIEF COMPLAINT/HISTORY OF PRESENT ILLNESS:  Joanna Becerril is a 75 year old female who presents for establishment visit.      Patient's main concern today is that she needs medication refill.      Patient has multiple medical problems.  We spent a lot of time reviewing her medication and problem list.      Her main concern is that of her pain pills.  She is on morphine extended release 15 mg, 1 p.o. t.i.d..  She is been on this regimen for almost 10 years.  She is been through pain management and previous physicians.  She does have ongoing arthritis issues as the source of her pain.  Her last visit with orthopedic surgery was over 2 years ago.  She states this works very well for her and she would like to continue with the medication.  I did explain to her that she will need to go through a urine drug screen, medication management agreement, and be seen every 3 months.  If everything seemed appropriate, I would prescribe the medication.  She wants to go ahead and pursue this.      Patient is currently on medication for hypertension, depression, anxiety, and bipolar disorder.  She needs refills as noted above.      Otherwise she has no new concerns at this moment      Past Medical History:   Diagnosis Date   • Achalasia with GERD 6/2/2014    s/p robotic heller myotomy with alireza fundoplication   • Anxiety disorder    • Bell's palsy 7/99    R side of face with good recovery   • Bipolar disorder (CMD)     Followed by Dr.Rina Smith, will be leaving the area. Her therapist is asked that I maintained her prescriptions.    • Carpal tunnel syndrome     Bilateral, followed by Dr. Gupta has undergone right carpal tunnel release. Now scheduled to undergo left carpal tunnel release.   • Cataracts, bilateral     Patient was undergone successful right cataract surgery. She anticipates undergoing a comparable procedure on her left eye in autumn 2018   • Cholecystitis     Scheduled to undergo robotic laparoscopic cholecystectomy  at [facility]   • Chronic back pain     Known degenerative disc disease and facet joint arthritis involving lumbar spine.. Has had 2 epidural injections by Dr. Monsalve. Now maintained on chronic narcotics.   • Chronic pain disorder     Patient is been maintained on morphine for chronic back and joint pains.   • Closed fracture of unspecified part of tibia 11/00    R tibial stress fracture per bone scan, Dr. Ma   • Combined forms of age-related cataract of both eyes 9/18/2017   • COPD (chronic obstructive pulmonary disease) (CMD)     Secondary to smoking. She did have a period of hypoxemia after her first hip replacement. Prompting CT scan which excluded pulmonary embolism.   • Depressive disorder, not elsewhere classified     Followed by Dr. Ammy Smith, who will be leaving the area. Her therapist has asked that I maintained her prescriptions.   • Esophageal reflux    • Fibrocystic breast changes     Her last mammogram in 2009 was unremarkable.   • Generalized osteoarthritis     Affecting knees, hips back and hands. She is status post bilateral hip replacements.   • History of migraine headaches    • History of pneumonia 2009    Persistent right lower lobe infiltrates prompted CT scans of chest. Resolved   • Hx of colonic polyps 8/16/2002, 10/19/2011    Hyperplastic polyps removed in 2002. Diverticulosis seen in 2011. 5 year followup recommended   • Hyperlipidemia     On medication   • Hypertension    • Hypothyroidism    • Iron deficiency anemia, unspecified 2002    She underwent an evaluation including colonoscopy. 3 polyps were removed at that time. Her blood counts normalized. However, a recent H./H done 10 4/2013 was 10.8/33.7. With normocytic indices. She is currently being followed by Dr. Swenson or GERD.   • Pain management contract signed 5/10/2017   • Panic disorder without agoraphobia     Followed by a psychiatrist   • Postmenopausal     Maintained on a Premarin cream under the direction of a gynecologist,   Jovan Zuniga.   • Prolapse of vaginal walls without mention of uterine prolapse     cystocele and cystocele with stress urine incontinence. Status post TERESE/BSO   • Sepsis (CMD)     right knee septic prosthesis   • Urinary incontinence        Past Surgical History:   Procedure Laterality Date   • Carpal tunnel release Left 7/27/15    Left carpal tunnel release   • Cataract extraction w/ intraocular lens implant Right 08/22/2018    Zander Lanier MD   • Cataract extraction w/ intraocular lens implant Left 09/19/2018    Zander Lanier MD   • Cholecystectomy  09/28/2018   • Colonoscopy diagnostic  10/19/11recall due 10/2016    Dr. Pan,    • Colonoscopy remove lesions hot biopsy forceps  8/12/02    Dr. DONALDO Zavaleta ([facility]) Hyperplastic Polyp x3 (10 Year F/U)   • Colonoscopy w biopsy  7/23/15    Dr. Swenson, Diverticulosis/IC Valve ulcer, F/U 1 year   • Colonoscopy w biopsy  06/21/2017    Dr. Swenson, Ileal Ulcer, F/U PRN   • Esophageal motility study  4/11/14    Dr. Swenson, read on 4/18/14, Achalasia   • Esophagogastroduodenoscopy transoral flex w/bx single or mult  12/20/10    gastritis,eso bx>normal path,neg hpylori,Dyphagia,Dr. Ricketts.    • Esophagogastroduodenoscopy transoral flex w/bx single or mult  1/20/14    Dr. Swenson, w/Bailey pH, Gastritis/Hx of GERD   • Esophagogastroduodenoscopy transoral flex w/bx single or mult  7/23/15    Dr. Swenson, Gastritis   • Flexible sigmoidoscopy bx  5/89    biopsy polyp, hyperplastic, Dr. Mendoza   • Hand finger surgery unlisted  01/18/2010    RT CTR, RT FCR TUNNEL DECOMPRESSION W/ 1ST & 4TH EXTENSOR COMPARTMENT RLS  Dr. Gupta   • Joint replacement     • Laparoscopy, cholecystectomy  09/28/2018    Surgeon - Dr. Bo Busch (done at [facility])   • Past surgical history  2013    Dr. Randolph Robotic procedure for Achalasia   • Pet ct fdg scan skull base to mid-thigh  12/18/2015   • Revise knee joint replace,all parts Right 04/13/2018    Re-implantation of right  knee replacement/Dr Cota    • Total hip replacement  1/27/03    RIGHT TOTAL HIP REPLACEMENT, DR COTA   • Total hip replacement  6/3/09    Left hip replacement/Dr Cota/   • Total knee  prosthesis removal w/ spacer insertion Right 01/2018    Done in [place]   • Total knee replacement  8/19/13    Right knee replacement/Dr Cota   • Total thyroidectomy     • Vaginal hysterectomy  3/07    complete vaginal hysterectomy Dr. Joanna Bliss        ALLERGIES:   Allergen Reactions   • Darvocet [Propoxyphene N-Apap] SHORTNESS OF BREATH     SOb and leg achiness   • Hydralazine Hcl [Apresoline] SHORTNESS OF BREATH     Pt stated she felt a 'panic attack' after administration of IV hydralazine. Experienced facial flushing and anxiety with shortness of breath.   • Amoxicillin-Pot Clavulanate GI UPSET     Bloody stools and urine (?). Was switched to Plain amoxicillin and tolerated fine   • Diclofenac DIARRHEA         • Levaquin DIARRHEA       Current Outpatient Medications   Medication Sig Dispense Refill   • morphine SR (MS Contin) 15 MG 12 hr tablet Take 1 tablet by mouth every 8 hours as needed for Pain. 90 tablet 0   • DULoxetine (CYMBALTA) 60 MG capsule Take 1 capsule by mouth in the morning and 1 capsule in the evening. 180 capsule 1   • PARoxetine (PAXIL) 20 MG tablet Take 1.5 tablets by mouth every morning. 135 tablet 0   • simvastatin (ZOCOR) 20 MG tablet Take 1 tablet by mouth daily. 90 tablet 0   • pantoprazole (PROTONIX) 40 MG tablet Take 1 tablet by mouth in the morning and 1 tablet in the evening. Take before meals. 180 tablet 0   • traZODone (DESYREL) 50 MG tablet Take 1.5 tablets by mouth nightly. 135 tablet 1   • lithium 150 MG capsule Take 1 capsule by mouth at bedtime. 90 capsule 1   • naLOXone (NARCAN) 4 MG/0.1ML nasal spray Spray the content of 1 device into 1 nostril. Call 911. May repeat with 2nd device in alternate nostril if no response in 2-3 minutes. 2 each 1   • Advair -21 MCG/ACT inhaler USE 2  INHALATIONS BY MOUTH  TWICE DAILY 3 each 0   • estradiol 0.5 mg/g (0.05 %) cream (in natural base) Apply 1 gram into the vagina 3 times weekly 30 g 0   • mirabegron ER (Myrbetriq) 50 MG 24 hr tablet Take 1 tablet by mouth daily. 90 tablet 3   • metoPROLOL tartrate (LOPRESSOR) 50 MG tablet TAKE 1 TABLET BY MOUTH  TWICE DAILY 180 tablet 0   • naLOXone (NARCAN) 4 MG/0.1ML nasal spray Spray the content of 1 device into 1 nostril. Call 911. May repeat with 2nd device in alternate nostril if no response in 2-3 minutes. 2 each 1   • COVID-19 mRNA bivalent, Moderna, (Moderna COVID-19 Bival Booster) 50 MCG/0.5ML Suspension Inject 0.5 mLs into the muscle once. 0.5 mL 0   • influenza virus quadrivalent vaccine inactivated (Fluzone High-Dose Quadrivalent) 0.7 ML prefilled syringe for injection Inject 0.7 mLs into the muscle 1 time for 1 dose 0.7 mL 0   • hydroCHLOROthiazide (HYDRODIURIL) 25 MG tablet TAKE 1 TABLET BY MOUTH  DAILY 90 tablet 1   • tiotropium (Spiriva HandiHaler) 18 MCG capsule for inhaler Place 1 capsule into inhaler and inhale daily. 90 capsule 3   • albuterol 108 (90 Base) MCG/ACT inhaler Inhale 1 puff into the lungs every 4 hours as needed for Shortness of Breath or Wheezing. 1 each 1   • estradiol (ESTRACE) 0.5 MG tablet Take 1 tablet by mouth daily. 90 tablet 3   • Omega-3 Fatty Acids (FISH OIL PO) Take 1 tablet by mouth as needed.     • B Complex Vitamins (B COMPLEX PO) Take 1 capsule by mouth daily.     • Nutritional Supplements (KETO PO) Take 1 capsule by mouth 3 times daily (before meals).     • BIOTIN PO Take 1 tablet by mouth every morning.     • naLOXone (NARCAN) 4 MG/0.1ML nasal spray Spray the content of 1 device into 1 nostril. Call 911. May repeat with 2nd device in alternate nostril if no response in 2-3 minutes. 2 each 1   • aspirin (ECOTRIN) 81 MG EC tablet Take 81 mg by mouth nightly.     • Ferrous Sulfate (Iron) 325 (65 Fe) MG Tab Take 1 tablet by mouth daily.     • cephalexin (KEFLEX) 500 MG  capsule Take 2 capsules 1 hour prior to dental procedure or colonoscopy 8 capsule 1   • Spacer/Aero-Holding Chambers Device Use as directed 1 each 0   • Alum Hydroxide-Mag Carbonate (GAVISCON PO) Take 1 tablet by mouth as needed.      • Cholecalciferol (VITAMIN D) 2000 UNITS CAPS Take 1 capsule by mouth daily.         No current facility-administered medications for this visit.        Family History   Problem Relation Age of Onset   • Hypertension Mother    • Other Mother 90        Alzheimers   • Cancer Mother         basal cell on face   • Hyperlipidemia Mother    • Heart Father 74        CABG  at age 74   • Diabetes Father          diet controlled   • Other Father         AAA   • Heart disease Father    • Cancer, Lung Father    • Depression Sister    • Cancer, Kidney Brother    • Other Brother         arthritis   • Diabetes Brother    • Other Brother         arthritis   • Stroke/TIA Maternal Grandmother    • Emphysema Maternal Grandfather    • Heart disease Paternal Grandmother    • Stroke/TIA Paternal Grandmother    • Heart Paternal Grandfather    • Cancer Son         Testicular CA   • Hyperlipidemia Son         Hyperlipidemia   • Osteoarthritis Son         Hips   • * Son         MVA   • Osteoarthritis Son         Hips   • Other Son         pancreatitis   • Cancer, Breast Paternal Aunt    • Cancer Paternal Aunt         breast cancer,  at age 32       Family Status   Relation Name Status   • Mo   at age 90   • Fa   at age 74   • Sis Rosa Alive   • Bro Vernon Alive   • Bro Ab Alive   • Jose Soriano Alive   • MGMo     • MGFa     • PGMo     • PGFa     • Son Vishnu Alive        testicular CA, hyperlipidemia   • Son Dread Alive        back surgery, osteoarthritis hips   • Son Uli         arthritis hips, pancreatitis,    • PAunt   at age 32        Breast cancer   • PAunt         Social History     Socioeconomic History   • Marital status:  /Civil Union     Spouse name: Alex   • Number of children: 3   • Years of education: Not on file   • Highest education level: Not on file   Occupational History   • Occupation:    Tobacco Use   • Smoking status: Former     Packs/day: 1.00     Years: 33.00     Pack years: 33.00     Types: Cigarettes     Quit date: 1997     Years since quittin.1   • Smokeless tobacco: Never   Vaping Use   • Vaping Use: never used   Substance and Sexual Activity   • Alcohol use: No     Alcohol/week: 0.0 standard drinks   • Drug use: No   • Sexual activity: Not Currently     Partners: Male     Birth control/protection: Surgical   Other Topics Concern   • Not on file   Social History Narrative    She is . She is a homemaker. She quit smoking in . She does not drink alcohol. She is sedentary. She spends her winter months in [place].     Social Determinants of Health     Financial Resource Strain: Not on file   Food Insecurity: Not on file   Transportation Needs: Not on file   Physical Activity: Not on file   Stress: Not on file   Social Connections: Not on file   Intimate Partner Violence: Not on file        Recent PHQ 2/9 Score    PHQ 2:  Date Adult PHQ 2 Score Adult PHQ 2 Interpretation   2023 0 No further screening needed       PHQ 9:       DEPRESSION ASSESSMENT/PLAN:  Depression screening is negative no further plan needed.        REVIEW OF SYSTEMS: The patient denies symptoms of:       General: fever, chills, night sweats, fatigue, weight loss, weight gain and decreased appetite  Skin: rash, itching, lumps or bumps or moles that are changing, hair changes and nail changes  Eyes: visual blurring, double vision, spots before the eyes and eye pain  Ears: decreased auditory acuity, ringing or tinnitus in the ears, pain in the ears and discharge from the ears  Nose: nose bleed, discharge from the nose and decrease in ability to smell  Mouth: soreness of the mouth or tongue or lips, lumps or bumps  in the mouth and abnormality of the teeth or gums  Throat: sore throat and hoarseness or voice change  Neck: swelling or masses in the neck, stiffness or pain in the neck and limited range of motion in the neck  Cardiorespiratory: chest pain, edema, cough, hemoptysis, wheeze and shortness of breath  Gastrointestinal: abdominal pain, nausea, vomiting, constipation, diarrhea, black tarry stools, blood in the stools, change in the bowel habits, sour burps or heart burn and indigestion  Neurologic: headache, weakness, loss of sensation, visual disturbance, trouble with balance or coordination and loss of memory  Musculoskeletal: joint stiffness, joint pain, joint swelling, muscle pain and blanching of the fingers with cold exposure    The 10-year ASCVD risk score (Aki CARTER, et al., 2019) is: 22.4%    Values used to calculate the score:      Age: 75 years      Sex: Female      Is Non- : No      Diabetic: No      Tobacco smoker: No      Systolic Blood Pressure: 126 mmHg      Is BP treated: Yes      HDL Cholesterol: 51 mg/dL      Total Cholesterol: 174 mg/dL    PHYSICAL EXAMINATION:   Visit Vitals  /86   Pulse 65   Ht 5' 5\" (1.651 m)   Wt 89.8 kg (197 lb 15.6 oz)   SpO2 95%   BMI 32.94 kg/m²       Wt Readings from Last 4 Encounters:   03/07/23 89.8 kg (197 lb 15.6 oz)   02/02/23 90 kg (198 lb 6.6 oz)   01/10/23 90.6 kg (199 lb 12.8 oz)   03/11/22 93 kg (205 lb)       GENERAL:  appears stated age, is in no apparent distress and is well developed and well nourished  SKIN:  normal color, normal texture, normal turgor, no skin rashes, no atypical appearing skin lesions and no bruises  HEAD:  normocephalic and non-tender  EYES:  pupils are equal and reactive to light and accommodation extraocular movements are full sclerae and conjunctivae are normal lids and lashes are normal  EARS:  pinna and external ear is normal bilaterally, external auditory canals are normal, tympanic membranes are normal,  middle ear space is normal bilaterally, there is no discomfort on traction of the pinna or palpation of the tragus and auditory acuity is grossly normal  NOSE:  external nose is normal to inspection, no septal deviation and anterior nares are normal  MOUTH/THROAT:  tongue is midline and appears normal, oropharynx appears normal, soft palate and uvula are normal and oral mucosa is normal  NECK:  no thyromegaly, no anterior cervical adenopathy, no posterior cervical adenopathy and no supraclavicular adenopathy  CHEST:  contour is normal with normal AP diameter, normal respiratory excursion and respiratory effort is not labored  HEART:  normal PMI, normal rate and rhythm, S1 and S2 normal, no S3 or S4, no murmurs and no extra heart sounds  LUNGS:  lungs are clear to auscultation with normal inspiratory/expiratory sounds, no rales, no rhonchi and no wheezes  ABDOMEN:  abdomen is soft, normal active bowel sounds, nontender, without masses, without hepatomegaly and without splenomegaly        ASSESSMENT:   1. Chronic low back pain without sciatica, unspecified back pain laterality    2. Lumbosacral spondylosis without myelopathy    3. Bipolar 1 disorder (CMD)    4. Bipolar affective disorder, current episode depressed, current episode severity unspecified (CMD)    5. Essential hypertension    6. Generalized anxiety disorder        PLAN:   Orders Placed This Encounter   • morphine SR (MS Contin) 15 MG 12 hr tablet   • DULoxetine (CYMBALTA) 60 MG capsule   • PARoxetine (PAXIL) 20 MG tablet   • simvastatin (ZOCOR) 20 MG tablet   • pantoprazole (PROTONIX) 40 MG tablet   • traZODone (DESYREL) 50 MG tablet   • lithium 150 MG capsule         Will have patient complete a medication management agreement form.  Patient will need a urine drug screen today.  Patient needs refills of the medications as noted above.  She is up-to-date on labs at this time.  I will see her back in 3 months for follow-up.      All questions  answered      FOLLOW UP: As noted      Perry Richardson MD   no

## 2023-04-03 NOTE — ED ADULT NURSE NOTE - NS ED NOTE ABUSE RESPONSE YN
Subjective   Patient ID: Thom Alvarez is a 23 m.o. male who presents for Fever, Earache, Cough, and Nasal Congestion.  Today he is accompanied by accompanied by parents.     HPI  Yesterday he was febrile, not eating as much.   Got tylenol and helps.  He has been pulling his ears.   Strep at day care.    Review of Systems    Objective   Temp 36.7 °C (98 °F) (Temporal)   Wt 14.2 kg Comment: 31.4lbs  BSA: There is no height or weight on file to calculate BSA.  Growth percentiles: No height on file for this encounter. 94 %ile (Z= 1.54) based on WHO (Boys, 0-2 years) weight-for-age data using vitals from 4/3/2023.     Physical Exam  Constitutional:       General: He is active.      Appearance: Normal appearance.   HENT:      Left Ear: Tympanic membrane is erythematous and bulging.   Neurological:      Mental Status: He is alert.         Assessment/Plan   Diagnoses and all orders for this visit:  Acute otitis media, unspecified otitis media type  -     cefdinir (Omnicef) 250 mg/5 mL suspension; Take 4 mL (200 mg) by mouth once daily for 10 days. Once daily at a time that is easily remembered  Thom was in for a possible ear infection.  On exam, he did have an ear infection. He is to take 4 ml once a day for 10 days.  Follow up if he does not get better.  
Yes

## 2023-10-25 ENCOUNTER — EMERGENCY (EMERGENCY)
Facility: HOSPITAL | Age: 73
LOS: 1 days | Discharge: HOME | End: 2023-10-25
Attending: EMERGENCY MEDICINE
Payer: MEDICARE

## 2023-10-25 VITALS
OXYGEN SATURATION: 99 % | WEIGHT: 157.41 LBS | TEMPERATURE: 99 F | HEIGHT: 65 IN | DIASTOLIC BLOOD PRESSURE: 81 MMHG | RESPIRATION RATE: 17 BRPM | SYSTOLIC BLOOD PRESSURE: 183 MMHG | HEART RATE: 90 BPM

## 2023-10-25 DIAGNOSIS — Z98.890 OTHER SPECIFIED POSTPROCEDURAL STATES: Chronic | ICD-10-CM

## 2023-10-25 DIAGNOSIS — Z90.710 ACQUIRED ABSENCE OF BOTH CERVIX AND UTERUS: Chronic | ICD-10-CM

## 2023-10-25 PROCEDURE — 99285 EMERGENCY DEPT VISIT HI MDM: CPT

## 2023-10-25 PROCEDURE — 72125 CT NECK SPINE W/O DYE: CPT | Mod: MA

## 2023-10-25 PROCEDURE — 70450 CT HEAD/BRAIN W/O DYE: CPT | Mod: MA

## 2023-10-25 PROCEDURE — 71045 X-RAY EXAM CHEST 1 VIEW: CPT

## 2023-10-25 PROCEDURE — 70450 CT HEAD/BRAIN W/O DYE: CPT | Mod: 26,MA

## 2023-10-25 PROCEDURE — 71045 X-RAY EXAM CHEST 1 VIEW: CPT | Mod: 26

## 2023-10-25 PROCEDURE — 99284 EMERGENCY DEPT VISIT MOD MDM: CPT | Mod: 25

## 2023-10-25 PROCEDURE — 72125 CT NECK SPINE W/O DYE: CPT | Mod: 26,MA

## 2023-10-25 NOTE — ED ADULT NURSE NOTE - NSFALLRISKINTERV_ED_ALL_ED

## 2023-10-25 NOTE — ED PROVIDER NOTE - PHYSICAL EXAMINATION
Exam:  General: Patient well appearing, vital signs within normal limits  HEENT: airway patent with moist mucous membranes, EOMI, abrasion over L maxilla and nasal bridge, no bony deformity  Cardiac: RRR S1/S2 with strong peripheral pulses  Respiratory: lungs clear without respiratory distress  GI: abdomen soft, non tender, non distended  Neuro: no gross neurologic deficits  MSK: actively ranging bilateral shoulders  Skin: warm, well perfused  Psych: normal mood and affect

## 2023-10-25 NOTE — ED PROVIDER NOTE - CARE PLAN
Principal Discharge DX:	Facial contusion, initial encounter  Secondary Diagnosis:	Abrasion of skin  Secondary Diagnosis:	Closed head injury   1

## 2023-10-25 NOTE — ED PROVIDER NOTE - OBJECTIVE STATEMENT
73-year-old woman with a past medical history of hypertension not on blood thinners presenting complaining of facial injuries and headache after mechanical trip and fall on the sidewalk with facial injury.  Denies loss of consciousness.  Is endorsing pain also in bilateral shoulders but does not feel getting is broken.  Denying other pain complaints.  Denying any preceding chest pain shortness of breath lightheadedness or presyncopal/syncopal symptoms

## 2023-10-25 NOTE — ED PROVIDER NOTE - PATIENT PORTAL LINK FT
HTN (hypertension)
You can access the FollowMyHealth Patient Portal offered by [facility] by registering at the following website: [email or web address]. By joining [employer]’s FollowMyHealth portal, you will also be able to view your health information using other applications (apps) compatible with our system.

## 2023-10-25 NOTE — ED PROVIDER NOTE - IV ALTEPLASE EXCL REL HIDDEN
Pt called abt referral and said no one notified her abt appts..could only verify the one one juana doyle in [facility]..told her to call kamran escobedo the mammo/bone den pr dr alaniz    show

## 2023-10-25 NOTE — ED PROVIDER NOTE - NSFOLLOWUPINSTRUCTIONS_ED_ALL_ED_FT
Thank you for choosing [facility] for your healthcare.    You were seen in the Emergency Department for a fall with an injury to your face and head.  Here you had a CAT scan of your head and neck which did not show any broken bones or bleeding or severe brain damage at this time.  It is okay to take Tylenol as needed at home for headaches.  You do have bruising on your face but there is no evidence of a significant break of the bones of your face at this time.  You can apply ice to the bruised areas for 10 to 15 minutes every 2 hours to help decrease the swelling.  The amount of swelling might progress or worsen over the next few days before begins improving.  Please follow-up with your primary care doctor in the next 2 days just to get rechecked.  Please return to the emergency room for any further emergent or concerning medical issues.    Huy por elegir [facility] para dickerson atención médica.    Usted fue atendido en el Departamento de Emergencias por kassy caída con kassy lesión en la mulu y la gary. Aquí le hicieron kassy tomografía computarizada de dickerson gary y stacey que no mostró ningún hueso roto ni sangrado ni daño cerebral grave en jane momento. Está norris maxwell Tylenol en casa según sea necesario para los aylin de gary. Tiene hematomas en la mulu, mar no hay evidencia de kassy rotura significativa de los huesos de la mulu en jane momento. Puede aplicar hielo en las áreas magulladas oskar 10 a 15 minutos cada 2 horas para ayudar a disminuir la hinchazón. La cantidad de hinchazón puede progresar o empeorar oskar los próximos días antes de que comience a mejorar. Yue un seguimiento con dickerson médico de atención primaria en los próximos 2 días solo para volver a controlarlo. Regrese a la haily de emergencias en eduardo de cualquier problema médico emergente o preocupante.

## 2023-10-25 NOTE — ED PROVIDER NOTE - CLINICAL SUMMARY MEDICAL DECISION MAKING FREE TEXT BOX
Mechanical trip and fall with facial abrasions, no laceration requiring repair, given age will obtain CT head to screen for occult intracranial injuries, CXR.  Offered medications for pain which she declined.

## 2024-02-14 NOTE — ED PROVIDER NOTE - CONSTITUTIONAL, MLM
normal... Well appearing, awake, alert, oriented to person, place, time/situation and in no apparent distress. If an upper endoscopy or enteroscopy was performed, you might have a sore throat for 1 to 2 days after the procedure. If it does not improve, please contact your doctor.

## 2024-04-28 ENCOUNTER — EMERGENCY (EMERGENCY)
Facility: HOSPITAL | Age: 74
LOS: 1 days | Discharge: HOME | End: 2024-04-28
Attending: EMERGENCY MEDICINE
Payer: MEDICARE

## 2024-04-28 VITALS
RESPIRATION RATE: 16 BRPM | HEART RATE: 77 BPM | DIASTOLIC BLOOD PRESSURE: 75 MMHG | TEMPERATURE: 98 F | HEIGHT: 67.32 IN | SYSTOLIC BLOOD PRESSURE: 173 MMHG | WEIGHT: 153.44 LBS | OXYGEN SATURATION: 98 %

## 2024-04-28 DIAGNOSIS — Z98.890 OTHER SPECIFIED POSTPROCEDURAL STATES: Chronic | ICD-10-CM

## 2024-04-28 DIAGNOSIS — Z90.710 ACQUIRED ABSENCE OF BOTH CERVIX AND UTERUS: Chronic | ICD-10-CM

## 2024-04-28 PROCEDURE — 99284 EMERGENCY DEPT VISIT MOD MDM: CPT | Mod: 25

## 2024-04-28 PROCEDURE — 70450 CT HEAD/BRAIN W/O DYE: CPT | Mod: MC

## 2024-04-28 PROCEDURE — 93005 ELECTROCARDIOGRAM TRACING: CPT

## 2024-04-28 PROCEDURE — 99284 EMERGENCY DEPT VISIT MOD MDM: CPT

## 2024-04-28 PROCEDURE — 70450 CT HEAD/BRAIN W/O DYE: CPT | Mod: 26,MC

## 2024-04-28 NOTE — ED PROVIDER NOTE - PATIENT PORTAL LINK FT
You can access the FollowMyHealth Patient Portal offered by [facility] by registering at the following website: [email or web address]. By joining [employer]’s FollowMyHealth portal, you will also be able to view your health information using other applications (apps) compatible with our system.

## 2024-04-28 NOTE — ED ADULT NURSE NOTE - OBJECTIVE STATEMENT
pt is here for hypertension.  pt stated that hypertension x 10 days, with severe headache, denied blurred vision or dizziness, denied numbness or chest pain,

## 2024-04-28 NOTE — ED ADULT NURSE NOTE - NSFALLUNIVINTERV_ED_ALL_ED
Bed/Stretcher in lowest position, wheels locked, appropriate side rails in place/Call bell, personal items and telephone in reach/Instruct patient to call for assistance before getting out of bed/chair/stretcher/Non-slip footwear applied when patient is off stretcher/[place] to call system/Physically safe environment - no spills, clutter or unnecessary equipment/Purposeful proactive rounding/Room/bathroom lighting operational, light cord in reach

## 2024-04-28 NOTE — ED PROVIDER NOTE - OBJECTIVE STATEMENT
pt with hs of htn noted her pressure to be elevate to 170's systolic and had a headache  headache gradual in onset  no visual changes no neruo deficits  taking bp meds daily  headache much improved today

## 2024-04-28 NOTE — ED PROVIDER NOTE - RESPIRATORY, MLM
Breath sounds clear and equal bilaterally. Benzoyl Peroxide Counseling: Patient counseled that medicine may cause skin irritation and bleach clothing.  In the event of skin irritation, the patient was advised to reduce the amount of the drug applied or use it less frequently.   The patient verbalized understanding of the proper use and possible adverse effects of benzoyl peroxide.  All of the patient's questions and concerns were addressed.

## 2024-04-28 NOTE — ED PROVIDER NOTE - CLINICAL SUMMARY MEDICAL DECISION MAKING FREE TEXT BOX
pt with slow onset headache for a few days  better now  with no nuero symtpoms  will get ct to r/o mass/ich   pt can follow [facility] tt pmd regarding bp control   now at baseline

## 2024-04-28 NOTE — ED PROVIDER NOTE - NSFOLLOWUPINSTRUCTIONS_ED_ALL_ED_FT
General Headache Without Cause  A headache is pain or discomfort felt around the head or neck area. There are many causes and types of headaches. A few common types include:  Tension headaches.  Migraine headaches.  Cluster headaches.  Chronic daily headaches.  Sometimes, the specific cause of a headache may not be found.    Follow these instructions at home:  Watch your condition for any changes. Let your health care provider know about them. Take these steps to help with your condition:    Managing pain    A bag of ice on a towel on the skin.   A heating pad for use on the painful area.   Take over-the-counter and prescription medicines only as told by your health care provider. Treatment may include medicines for pain that are taken by mouth or applied to the skin.  Lie down in a dark, quiet room when you have a headache.  Keep lights dim if bright lights bother you or make your headaches worse.  If directed, put ice on your head and neck area:  Put ice in a plastic bag.  Place a towel between your skin and the bag.  Leave the ice on for 20 minutes, 2–3 times per day.  Remove the ice if your skin turns bright red. This is very important. If you cannot feel pain, heat, or cold, you have a greater risk of damage to the area.  If directed, apply heat to the affected area. Use the heat source that your health care provider recommends, such as a moist heat pack or a heating pad.  Place a towel between your skin and the heat source.  Leave the heat on for 20–30 minutes.  Remove the heat if your skin turns bright red. This is especially important if you are unable to feel pain, heat, or cold. You have a greater risk of getting burned.  Eating and drinking    Eat meals on a regular schedule.  If you drink alcohol:  Limit how much you have to:  0–1 drink a day for women who are not pregnant.  0–2 drinks a day for men.  Know how much alcohol is in a drink. In the [place] one drink equals one 12 oz bottle of beer (355 mL), one 5 oz glass of wine (148 mL), or one 1½ oz glass of hard liquor (44 mL).  Stop drinking caffeine, or decrease the amount of caffeine you drink.  Drink enough fluid to keep your urine pale yellow.  General instructions    A person writing in a journal.   Keep a headache journal to help find out what may trigger your headaches. For example, write down:  What you eat and drink.  How much sleep you get.  Any change to your diet or medicines.  Try massage or other relaxation techniques.  Limit stress.  Sit up straight, and do not tense your muscles.  Do not use any products that contain nicotine or tobacco. These products include cigarettes, chewing tobacco, and vaping devices, such as e-cigarettes. If you need help quitting, ask your health care provider.  Exercise regularly as told by your health care provider.  Sleep on a regular schedule. Get 7–9 hours of sleep each night, or the amount recommended by your health care provider.  Keep all follow-up visits. This is important.  Contact a health care provider if:  Medicine does not help your symptoms.  You have a headache that is different from your usual headache.  You have nausea or you vomit.  You have a fever.  Get help right away if:  Your headache:  Becomes severe quickly.  Gets worse after moderate to intense physical activity.  You have any of these symptoms:  Repeated vomiting.  Pain or stiffness in your neck.  Changes to your vision.  Pain in an eye or ear.  Problems with speech.  Muscular weakness or loss of muscle control.  Loss of balance or coordination.  You feel faint or pass out.  You have confusion.  You have a seizure.  These symptoms may represent a serious problem that is an emergency. Do not wait to see if the symptoms will go away. Get medical help right away. Call your local emergency services (911 in the [place] Do not drive yourself to the hospital.    Summary  A headache is pain or discomfort felt around the head or neck area.  There are many causes and types of headaches. In some cases, the cause may not be found.  Keep a headache journal to help find out what may trigger your headaches. Watch your condition for any changes. Let your health care provider know about them.  Contact a health care provider if you have a headache that is different from the usual headache, or if your symptoms are not helped by medicine.  Get help right away if your headache becomes severe, you vomit, you have a loss of vision, you lose your balance, or you have a seizure.  This information is not intended to replace advice given to you by your health care provider. Make sure you discuss any questions you have with your health care provider.    Document Revised: 05/18/2022 Document Reviewed: 05/18/2022  [employer] Patient Education © 2024 [employer]  Elsevier logo  Terms and Conditions  Privacy Policy  Editorial Policy  All content on this site: Copyright © 2024 Elsevier, its licensors, and contributors. All rights are reserved, including those for text and data mining, AI training, and similar technologies. For all open access content, the Creative Commons licensing terms apply.  Cookies are used by this site. To decline or learn more, visit our Cookies page.

## 2024-08-30 NOTE — ED ADULT NURSE NOTE - DRUG PRE-SCREENING (DAST -1)
Patient presents to ED with c/o mid abdominal pain radiating to b/l trapezoids associated with N/V since 1500 yesterday.  Seen at urgent care yesterday and advised to come to ED for further evaluation.  Last BM yesterday.  No meds PTA   in triage, EKG in progress.
Statement Selected

## 2024-09-11 NOTE — ED ADULT TRIAGE NOTE - INTERNATIONAL TRAVEL
Transitional Care Management Telephone Call Attempt    Discharge Date: 9/10/24  Discharge Location: [facility] Hospital: [facility]    Call Attempt Date: 9/11/2024  Call Attempt: First  
No

## 2025-06-15 NOTE — ED ADULT NURSE NOTE - TEMPLATE LIST FOR HEAD TO TOE ASSESSMENT
[facility]    DISCHARGE SUMMARY   Admission Date: 6/13/2025  PCP: Magui Vale MD    Discharge Date: 06/15/25  Discharge Provider: Patricia Riojas MD     ADMISSION INFORMATION   Reason For Admission: Atrial fibrillation with rapid ventricular response  (CMD) [I48.91]    Final Discharge Diagnoses:     Mechanical fall likely contributed to by the UTI and A-fib with RVR  A-fib RVR likely secondary to dehydration and undergoing treatment for UTI    Smoking status: Former Tobacco User  Body mass index is 27.73 kg/m².: Patient is overweight with BMI 24-30    Code Status on Discharge: Review History  TCM FOLLOW UP     Disposition: Home  Readmission Risk Score (%) =      Medication Changes and Reason:   Preview After Visit Summary    Your medications have changed   START taking:  dilTIAZem (Cardizem CD)   Start taking on: June 16, 2025   CHANGE how you take:  metoPROLOL succinate (TOPROL-XL)    STOP taking:  cefadroxil 500 MG capsule (DURICEF)      Summary of your Discharge Medications        Take these Medications        Details   apixaBAN 5 MG Tab  Commonly known as: ELIQUIS   Take 1 tablet by mouth every 12 hours.     atorvastatin 40 MG tablet  Commonly known as: LIPITOR   Take 1 tablet by mouth nightly.     Calcium 600 MG tablet   Take 600 mg by mouth in the morning and 600 mg in the evening.     cyanocobalamin 2000 MCG tablet   Take 1 tablet by mouth daily.  Comment: Please dispense to pt     dilTIAZem 120 MG 24 hr capsule  Commonly known as: Cardizem CD  Start taking on: June 16, 2025   Take 1 capsule by mouth daily. Begin taking on June 16, 2025.     donepezil 10 MG tablet  Commonly known as: ARICEPT   Take 1 tablet by mouth nightly.     melatonin 5 MG   Take 1 tablet by mouth nightly.     metoPROLOL succinate 100 MG 24 hr tablet  Commonly known as: TOPROL-XL   Take 1 tablet by mouth in the morning and 1 tablet in the evening.     sertraline 25 MG tablet  Commonly known as: ZOLOFT    Fall Take 1 tablet by mouth daily.     sulfamethoxazole-trimethoprim 800-160 MG per tablet  Commonly known as: BACTRIM DS   Take 1 tablet by mouth in the morning and 1 tablet in the evening. Do all this for 5 days.     traZODone 50 MG tablet  Commonly known as: DESYREL   Take 0.5 tablets by mouth nightly.     vitamin D 50 mcg (2,000 units) capsule   Take 2,000 Units by mouth daily.              Tests Pending or Requiring Follow Up:   None.    Needs Further Goals of Care Discussion:   No   Home Health Referral:   NA       FOLLOW UP APPOINTMENTS   Magui Vale MD  [address]  [address]  [facility] [address]  [phone]    Schedule an appointment as soon as possible for a visit in 3 day(s)      Miko Merlos MD  [address]  [address]  [facility] [address]  [phone]    Schedule an appointment as soon as possible for a visit in 2 week(s)      DISCHARGE INSTRUCTIONS     Activity Instructions: Normal activity as tolerated    HOSPITAL COURSE   Admission Narrative    This patient was brought to the emergency room after she was observed on [employer],  falling and missing her bed during the night.  Patient had recently been diagnosed with a UTI she was on Bactrim for that.  She was brought to the emergency room where she was evaluated.  And the workup in the ER revealed A-fib with RVR.  She did get some IV fluids and IV Lopressor and there was an improvement in her heart rate.  Her troponins were normal.  Urinalysis was completed and that was completely normal as well indicating that the Bactrim was working.  She was seen evaluated by cardiology.  Medications were adjusted and she was ambulated but then it was noted that her heart rate went up to 140 and she began to have some wheezing in her lungs.  She is an ex-smoker but quit many years ago.  Does not have any known underlying pulmonary problems.  Medications were again adjusted and she was placed on metoprolol 100 mg twice a day.  Diltiazem 120 mg  was added as well and this seemed to improve her rate control considerably.  She was again ambulated and her heart rate went from .  At discharge her heart rate was 87.  I did update the family daily.  And they were in agreement with the discharge plan as she does have 16-hour day caregivers.  And a nannycam watches her overnight.    She was in agreement with the discharge plan as was the daughter.  She was discharged home in stable condition with detailed discharge instructions and close follow-up recommended.        Consultants:  IP CONSULT TO CARDIOLOGY     Surgical Procedures:            DISCHARGE PHYSICAL EXAM     Blood pressure 120/87, pulse (!) 103, temperature 96.8 °F (36 °C), resp. rate 16, height 5' 7\" (1.702 m), weight 80.3 kg (177 lb 0.5 oz), SpO2 97%.       Wt Readings from Last 3 Encounters:   06/15/25 80.3 kg (177 lb 0.5 oz)   06/11/25 81.3 kg (179 lb 3.2 oz)   06/09/25 80.5 kg (177 lb 6.4 oz)       General:  No acute distress.  HEENT: Normocephalic, atraumatic, PERRL, intact extraocular movement.  Neck:  Trachea is midline. No adenopathy.    Cardiovascular:  irregular rate and rhythm, normal S1, S2, no added sounds or murmurs.  Respiratory: Clear to auscultation bilaterally.  No wheezes, rales or rhonchi.  Gastrointestinal:  Soft, nontender and nondistended, no hepatomegaly or splenomegaly. bowel sounds present.  Neuro:   Alert and Oriented to time, place, person. CN II-XII intact. no focal weakness or sensory deficits.  Psychiatric:   Cooperative.  Appropriate mood & affect.  Normal judgment.  Skin:  Warm and dry without rash.  Extremities: No pitting edema of the lower extremities bilaterally, distal pulses intact.      SIGNIFICANT DIAGNOSTIC STUDIES    Imaging  Micro Summary  Labs Since Admission    Time taken to coordinate discharge care:  Discharge Time: More than 30 Minutes   Discharge instructions, medications and followup appointment were discussed with the patient and after visit  summary was given.     The case was reviewed with Dr Darren Lambert.  A substantive portion of the medical decision making and plan of care was performed by Dr Darren Lambert.      Debra Colindres PA-C